# Patient Record
Sex: MALE | Race: WHITE | HISPANIC OR LATINO | Employment: FULL TIME | URBAN - METROPOLITAN AREA
[De-identification: names, ages, dates, MRNs, and addresses within clinical notes are randomized per-mention and may not be internally consistent; named-entity substitution may affect disease eponyms.]

---

## 2017-06-17 ENCOUNTER — OFFICE VISIT (OUTPATIENT)
Dept: URGENT CARE | Facility: PHYSICIAN GROUP | Age: 32
End: 2017-06-17
Payer: COMMERCIAL

## 2017-06-17 VITALS
HEART RATE: 72 BPM | TEMPERATURE: 98.1 F | BODY MASS INDEX: 31.58 KG/M2 | DIASTOLIC BLOOD PRESSURE: 80 MMHG | HEIGHT: 64 IN | WEIGHT: 185 LBS | OXYGEN SATURATION: 95 % | SYSTOLIC BLOOD PRESSURE: 118 MMHG

## 2017-06-17 DIAGNOSIS — R10.9 BELLY PAIN: ICD-10-CM

## 2017-06-17 DIAGNOSIS — R53.83 MALAISE AND FATIGUE: ICD-10-CM

## 2017-06-17 DIAGNOSIS — R21 RASH/SKIN ERUPTION: ICD-10-CM

## 2017-06-17 DIAGNOSIS — R53.81 MALAISE AND FATIGUE: ICD-10-CM

## 2017-06-17 LAB
APPEARANCE UR: CLEAR
BILIRUB UR STRIP-MCNC: NORMAL MG/DL
COLOR UR AUTO: YELLOW
GLUCOSE UR STRIP.AUTO-MCNC: NORMAL MG/DL
KETONES UR STRIP.AUTO-MCNC: NORMAL MG/DL
LEUKOCYTE ESTERASE UR QL STRIP.AUTO: NORMAL
NITRITE UR QL STRIP.AUTO: NORMAL
PH UR STRIP.AUTO: 6 [PH] (ref 5–8)
PROT UR QL STRIP: NORMAL MG/DL
RBC UR QL AUTO: NORMAL
SP GR UR STRIP.AUTO: 1.02
UROBILINOGEN UR STRIP-MCNC: NORMAL MG/DL

## 2017-06-17 PROCEDURE — 99203 OFFICE O/P NEW LOW 30 MIN: CPT | Performed by: FAMILY MEDICINE

## 2017-06-17 PROCEDURE — 81002 URINALYSIS NONAUTO W/O SCOPE: CPT | Performed by: FAMILY MEDICINE

## 2017-06-17 RX ORDER — ALBENDAZOLE 200 MG/1
400 TABLET, FILM COATED ORAL ONCE
Qty: 2 TAB | Refills: 0 | Status: SHIPPED | OUTPATIENT
Start: 2017-06-17 | End: 2017-06-17

## 2017-06-17 RX ORDER — DESONIDE 0.5 MG/G
CREAM TOPICAL
Qty: 30 G | Refills: 1 | Status: SHIPPED | OUTPATIENT
Start: 2017-06-17 | End: 2018-08-14

## 2017-06-17 RX ORDER — DICYCLOMINE HCL 20 MG
20 TABLET ORAL EVERY 6 HOURS
Qty: 40 TAB | Refills: 3 | Status: SHIPPED | OUTPATIENT
Start: 2017-06-17 | End: 2017-06-27

## 2017-06-17 ASSESSMENT — ENCOUNTER SYMPTOMS
FEVER: 0
FOCAL WEAKNESS: 0
DIZZINESS: 0
ORTHOPNEA: 0
HEMOPTYSIS: 0
ABDOMINAL PAIN: 1
SHORTNESS OF BREATH: 0
CHILLS: 0

## 2017-06-17 NOTE — PROGRESS NOTES
"Subjective:      Suresh Ho is a 32 y.o. male who presents with Abdominal Pain    Chief Complaint   Patient presents with   • Abdominal Pain     weak, skin discoloration, bloated, center abd pain x 3 months, sx getting worse        - This is a very pleasant 32 y.o. male with complaints of intermittent spells of non specific abd pain, malaise bout diarrhea abd craps/bloating for past 12yrs. No weight changes fever. He is worried about parasites. Also itchy rash for several months Rt elbow and eyelids.     Has no PCP. We discussed he will need to get in w/ pcp and GI to work these ongoing longterm issues up.           ALLERGIES:  Review of patient's allergies indicates not on file.     PMH:  No past medical history on file.     MEDS:    Current outpatient prescriptions:   •  desonide (TRIDESILON) 0.05 % Cream, Apply tid x 1 week, Disp: 30 g, Rfl: 1  •  dicyclomine (BENTYL) 20 MG Tab, Take 1 Tab by mouth every 6 hours for 10 days., Disp: 40 Tab, Rfl: 3  •  albendazole (ALBENZA) 200 MG Tab, Take 2 Tabs by mouth Once for 1 dose., Disp: 2 Tab, Rfl: 0    ** I have documented what I find to be significant in regards to past medical, social, family and surgical history  in my HPI or under PMH/PSH/FH review section, otherwise it is contributory **             HPI    Review of Systems   Constitutional: Negative for fever and chills.   Respiratory: Negative for hemoptysis and shortness of breath.    Cardiovascular: Negative for chest pain and orthopnea.   Gastrointestinal: Positive for abdominal pain.   Neurological: Negative for dizziness and focal weakness.          Objective:     /80 mmHg  Pulse 72  Temp(Src) 36.7 °C (98.1 °F)  Ht 1.626 m (5' 4\")  Wt 83.915 kg (185 lb)  BMI 31.74 kg/m2  SpO2 95%     Physical Exam   Constitutional: He appears well-developed. No distress.   HENT:   Head: Normocephalic and atraumatic.   Mouth/Throat: Oropharynx is clear and moist.   Eyes: Conjunctivae are normal.   Neck: " Neck supple.   Cardiovascular: Regular rhythm.    No murmur heard.  Pulmonary/Chest: Effort normal and breath sounds normal. No respiratory distress.   Abdominal: Soft. He exhibits no distension. There is no tenderness. There is no rebound and no guarding.   Neurological: He is alert. He exhibits normal muscle tone.   Skin: Skin is warm and dry.   Psychiatric: He has a normal mood and affect. Judgment normal.   Nursing note and vitals reviewed.              Assessment/Plan:         1. Belly pain  REFERRAL TO GASTROENTEROLOGY    REFERRAL TO [facility]    POCT Urinalysis    CBC WITH DIFFERENTIAL    COMP METABOLIC PANEL    TSH WITH REFLEX TO FT4    COMPLETE O&P    dicyclomine (BENTYL) 20 MG Tab    albendazole (ALBENZA) 200 MG Tab   2. Malaise and fatigue  REFERRAL TO GASTROENTEROLOGY    REFERRAL TO [facility]    POCT Urinalysis    CBC WITH DIFFERENTIAL    COMP METABOLIC PANEL    TSH WITH REFLEX TO FT4    COMPLETE O&P   3. Rash/skin eruption  REFERRAL TO [facility]    CBC WITH DIFFERENTIAL    COMP METABOLIC PANEL    TSH WITH REFLEX TO FT4    desonide (TRIDESILON) 0.05 % Cream             Dx & d/c instructions discussed w/ patient and/or family members. Follow up w/ Prvt Dr or here in 3-4 days if not getting better, sooner if needed,  ER if worse and UC/PCP unavailable.        Possible side effects (i.e. Rash, GI upset/constipation, sedation, elevation of BP or sugars) of any medications given discussed.

## 2017-06-19 ENCOUNTER — HOSPITAL ENCOUNTER (OUTPATIENT)
Dept: LAB | Facility: MEDICAL CENTER | Age: 32
End: 2017-06-19
Attending: FAMILY MEDICINE
Payer: COMMERCIAL

## 2017-06-19 DIAGNOSIS — R53.81 MALAISE AND FATIGUE: ICD-10-CM

## 2017-06-19 DIAGNOSIS — R53.83 MALAISE AND FATIGUE: ICD-10-CM

## 2017-06-19 DIAGNOSIS — R10.9 BELLY PAIN: ICD-10-CM

## 2017-06-19 DIAGNOSIS — R21 RASH/SKIN ERUPTION: ICD-10-CM

## 2017-06-19 LAB
ALBUMIN SERPL BCP-MCNC: 4.4 G/DL (ref 3.2–4.9)
ALBUMIN/GLOB SERPL: 1.6 G/DL
ALP SERPL-CCNC: 74 U/L (ref 30–99)
ALT SERPL-CCNC: 78 U/L (ref 2–50)
ANION GAP SERPL CALC-SCNC: 8 MMOL/L (ref 0–11.9)
AST SERPL-CCNC: 31 U/L (ref 12–45)
BASOPHILS # BLD AUTO: 0.7 % (ref 0–1.8)
BASOPHILS # BLD: 0.04 K/UL (ref 0–0.12)
BILIRUB SERPL-MCNC: 0.3 MG/DL (ref 0.1–1.5)
BUN SERPL-MCNC: 22 MG/DL (ref 8–22)
CALCIUM SERPL-MCNC: 9.6 MG/DL (ref 8.5–10.5)
CHLORIDE SERPL-SCNC: 107 MMOL/L (ref 96–112)
CO2 SERPL-SCNC: 24 MMOL/L (ref 20–33)
CREAT SERPL-MCNC: 1.04 MG/DL (ref 0.5–1.4)
EOSINOPHIL # BLD AUTO: 0.08 K/UL (ref 0–0.51)
EOSINOPHIL NFR BLD: 1.5 % (ref 0–6.9)
ERYTHROCYTE [DISTWIDTH] IN BLOOD BY AUTOMATED COUNT: 43 FL (ref 35.9–50)
GFR SERPL CREATININE-BSD FRML MDRD: >60 ML/MIN/1.73 M 2
GLOBULIN SER CALC-MCNC: 2.7 G/DL (ref 1.9–3.5)
GLUCOSE SERPL-MCNC: 104 MG/DL (ref 65–99)
HCT VFR BLD AUTO: 47.9 % (ref 42–52)
HGB BLD-MCNC: 15.9 G/DL (ref 14–18)
IMM GRANULOCYTES # BLD AUTO: 0.01 K/UL (ref 0–0.11)
IMM GRANULOCYTES NFR BLD AUTO: 0.2 % (ref 0–0.9)
LYMPHOCYTES # BLD AUTO: 2.51 K/UL (ref 1–4.8)
LYMPHOCYTES NFR BLD: 46.4 % (ref 22–41)
MCH RBC QN AUTO: 30.9 PG (ref 27–33)
MCHC RBC AUTO-ENTMCNC: 33.2 G/DL (ref 33.7–35.3)
MCV RBC AUTO: 93 FL (ref 81.4–97.8)
MONOCYTES # BLD AUTO: 0.4 K/UL (ref 0–0.85)
MONOCYTES NFR BLD AUTO: 7.4 % (ref 0–13.4)
NEUTROPHILS # BLD AUTO: 2.37 K/UL (ref 1.82–7.42)
NEUTROPHILS NFR BLD: 43.8 % (ref 44–72)
NRBC # BLD AUTO: 0 K/UL
NRBC BLD AUTO-RTO: 0 /100 WBC
PLATELET # BLD AUTO: 229 K/UL (ref 164–446)
PMV BLD AUTO: 12 FL (ref 9–12.9)
POTASSIUM SERPL-SCNC: 4 MMOL/L (ref 3.6–5.5)
PROT SERPL-MCNC: 7.1 G/DL (ref 6–8.2)
RBC # BLD AUTO: 5.15 M/UL (ref 4.7–6.1)
SODIUM SERPL-SCNC: 139 MMOL/L (ref 135–145)
TSH SERPL DL<=0.005 MIU/L-ACNC: 1.72 UIU/ML (ref 0.3–3.7)
WBC # BLD AUTO: 5.4 K/UL (ref 4.8–10.8)

## 2017-06-19 PROCEDURE — 84443 ASSAY THYROID STIM HORMONE: CPT

## 2017-06-19 PROCEDURE — 87328 CRYPTOSPORIDIUM AG IA: CPT

## 2017-06-19 PROCEDURE — 80053 COMPREHEN METABOLIC PANEL: CPT

## 2017-06-19 PROCEDURE — 87329 GIARDIA AG IA: CPT

## 2017-06-19 PROCEDURE — 36415 COLL VENOUS BLD VENIPUNCTURE: CPT

## 2017-06-19 PROCEDURE — 85025 COMPLETE CBC W/AUTO DIFF WBC: CPT

## 2017-06-20 LAB
G LAMBLIA+C PARVUM AG STL QL RAPID: NORMAL
SIGNIFICANT IND 70042: NORMAL
SOURCE SOURCE: NORMAL

## 2017-06-21 ENCOUNTER — TELEPHONE (OUTPATIENT)
Dept: URGENT CARE | Facility: PHYSICIAN GROUP | Age: 32
End: 2017-06-21

## 2017-06-21 NOTE — TELEPHONE ENCOUNTER
Kindly call (document call) and leave message or let patient know his lab work/stool sample tests were all essentially normal. Follow up w/ GI as planned.

## 2017-07-03 ENCOUNTER — APPOINTMENT (OUTPATIENT)
Dept: MEDICAL GROUP | Facility: MEDICAL CENTER | Age: 32
End: 2017-07-03

## 2017-07-17 ENCOUNTER — OFFICE VISIT (OUTPATIENT)
Dept: MEDICAL GROUP | Facility: MEDICAL CENTER | Age: 32
End: 2017-07-17
Payer: COMMERCIAL

## 2017-07-17 VITALS
OXYGEN SATURATION: 99 % | RESPIRATION RATE: 16 BRPM | TEMPERATURE: 99.7 F | DIASTOLIC BLOOD PRESSURE: 76 MMHG | WEIGHT: 172.6 LBS | HEIGHT: 64 IN | BODY MASS INDEX: 29.47 KG/M2 | HEART RATE: 74 BPM | SYSTOLIC BLOOD PRESSURE: 120 MMHG

## 2017-07-17 DIAGNOSIS — G43.709 CHRONIC MIGRAINE WITHOUT AURA WITHOUT STATUS MIGRAINOSUS, NOT INTRACTABLE: ICD-10-CM

## 2017-07-17 DIAGNOSIS — Z76.89 ENCOUNTER TO ESTABLISH CARE: ICD-10-CM

## 2017-07-17 DIAGNOSIS — M25.50 PAIN IN JOINTS: ICD-10-CM

## 2017-07-17 DIAGNOSIS — Z00.00 PREVENTATIVE HEALTH CARE: ICD-10-CM

## 2017-07-17 DIAGNOSIS — K21.9 GASTROESOPHAGEAL REFLUX DISEASE, ESOPHAGITIS PRESENCE NOT SPECIFIED: ICD-10-CM

## 2017-07-17 DIAGNOSIS — K92.1 HEMATOCHEZIA: ICD-10-CM

## 2017-07-17 DIAGNOSIS — R14.0 ABDOMINAL BLOATING: ICD-10-CM

## 2017-07-17 PROBLEM — G43.909 MIGRAINES: Status: ACTIVE | Noted: 2017-07-17

## 2017-07-17 PROCEDURE — 99203 OFFICE O/P NEW LOW 30 MIN: CPT | Performed by: PHYSICIAN ASSISTANT

## 2017-07-17 RX ORDER — SUMATRIPTAN 50 MG/1
50 TABLET, FILM COATED ORAL
Qty: 10 TAB | Refills: 3 | Status: SHIPPED | OUTPATIENT
Start: 2017-07-17 | End: 2018-08-14

## 2017-07-17 ASSESSMENT — PATIENT HEALTH QUESTIONNAIRE - PHQ9: CLINICAL INTERPRETATION OF PHQ2 SCORE: 0

## 2017-07-17 NOTE — ASSESSMENT & PLAN NOTE
Also complains of a history of abdominal bloating with associated reflux. Symptoms of bloating is more pain. Denies any nausea or vomiting. No diarrhea or constipation. He has a referral to see GI and an appointment in August.

## 2017-07-17 NOTE — Clinical Note
[facility]  Luis Pradhan PA-C  [address]  [place] [address]  Fax: [phone]   Authorization for Release/Disclosure of   Protected Health Information   Name: SURESH RODRIGUEZ : 1985 SSN: [id number]   Address: [address] [place] [id number] Phone:    [phone] (home)    I authorize the entity listed below to release/disclose the PHI below to:   [facility]/Luis Pradhan PA-C and Luis Pradhan PA-C   Provider or Entity Name:  [facility]   Address   City, State, [place], [place]   Phone:      Fax:     Reason for request: continuity of care   Information to be released:    [  ] LAST COLONOSCOPY,  including any PATH REPORT and follow-up  [  ] LAST FIT/COLOGUARD RESULT [  ] LAST DEXA  [  ] LAST MAMMOGRAM  [  ] LAST PAP  [  ] LAST LABS [  ] RETINA EXAM REPORT  [  ] IMMUNIZATION RECORDS  [xxx  ] Release all info      [  ] Check here and initial the line next to each item to release ALL health information INCLUDING  _____ Care and treatment for drug and / or alcohol abuse  _____ HIV testing, infection status, or AIDS  _____ Genetic Testing    DATES OF SERVICE OR TIME PERIOD TO BE DISCLOSED: _____________  I understand and acknowledge that:  * This Authorization may be revoked at any time by you in writing, except if your health information has already been used or disclosed.  * Your health information that will be used or disclosed as a result of you signing this authorization could be re-disclosed by the recipient. If this occurs, your re-disclosed health information may no longer be protected by State or Federal laws.  * You may refuse to sign this Authorization. Your refusal will not affect your ability to obtain treatment.  * This Authorization becomes effective upon signing and will  on (date) __________.      If no date is indicated, this Authorization will  one (1) year from the signature date.    Name: Suresh Rodriguez    Signature:   Date:          7/17/2017       PLEASE FAX REQUESTED RECORDS BACK TO: [phone]

## 2017-07-17 NOTE — ASSESSMENT & PLAN NOTE
This is a 32-year-old male who is here today for follow-up. Seen in urgent care. He has a history of right red blood per rectum. Typically he'll have a lot of blood noted and sometimes inside of the stool. This going on for many years. He had labs performed that showed no abnormalities and his CBC or Giardia panel.

## 2017-07-17 NOTE — PROGRESS NOTES
Subjective:   Suresh Ho is a 32 y.o. male here today for establishing care and to discuss several chronic medical conditions.    Pain in joints  Complains of a chronic history of bilateral knee pain. Pain also in his elbow and shoulders. Symptoms usually worse in the winter. He believes he has arthritis. Takes no regular medications.     Hematochezia  This is a 32-year-old male who is here today for follow-up. Seen in urgent care. He has a history of right red blood per rectum. Typically he'll have a lot of blood noted and sometimes inside of the stool. This going on for many years. He had labs performed that showed no abnormalities and his CBC or Giardia panel.    Abdominal bloating  Also complains of a history of abdominal bloating with associated reflux. Symptoms of bloating is more pain. Denies any nausea or vomiting. No diarrhea or constipation. He has a referral to see GI and an appointment in August.    GERD (gastroesophageal reflux disease)  Has heartburn and reflux symptoms for several years. Symptoms occur 2-3 times a week. He will take Tums for symptoms. States recently changed his diet. He is eating healthier. Gave up caffeine.    Migraines  Has a chronic history of migraines. Migraines usually the left side of the head. We'll take a daily resolved. Associated nauseousness but no vomiting. He will take over-the-counter Tylenol or other migraine medications. Denies anything being very effective.       Current medicines (including changes today)  Current Outpatient Prescriptions   Medication Sig Dispense Refill   • sumatriptan (IMITREX) 50 MG Tab Take 1 Tab by mouth Once PRN for Migraine for up to 1 dose. May take one tablet 2 hours after 1st if still with migraine. 10 Tab 3   • desonide (TRIDESILON) 0.05 % Cream Apply tid x 1 week 30 g 1     No current facility-administered medications for this visit.     He  has no past medical history on file.    ROS   No chest pain, no shortness of breath,  "no abdominal pain and all other systems were reviewed and are negative.       Objective:     Blood pressure 120/76, pulse 74, temperature 37.6 °C (99.7 °F), resp. rate 16, height 1.626 m (5' 4.02\"), weight 78.291 kg (172 lb 9.6 oz), SpO2 99 %. Body mass index is 29.61 kg/(m^2).   Physical Exam:  Constitutional: Alert, no distress.  Skin: Warm, dry, good turgor, no rashes in visible areas.  Eye: Equal, round and reactive, conjunctiva clear, lids normal.  ENMT: Lips without lesions, good dentition, oropharynx clear.  Neck: Trachea midline, no masses.   Lymph: No cervical or supraclavicular lymphadenopathy  Respiratory: Unlabored respiratory effort, lungs clear to auscultation, no wheezes, no ronchi.  Cardiovascular: Normal S1, S2, no murmur, no edema.  Abdomen: Soft, non-tender, no masses.  Psych: Alert and oriented x3, normal affect and mood.        Assessment and Plan:   The following treatment plan was discussed    1. Hematochezia  Chronic condition likely secondary to hemorrhoids. Will need a colonoscopy. Continue follow-up with GI.    2. Chronic migraine without aura without status migrainosus, not intractable  Chronic condition. May continue over-the-counter vitamin medication as directed. Provided Imitrex 50 mg take as directed.    3. Abdominal bloating  Chronic condition. Likely secondary to GERD. Follow up with GI for upper endoscopy.    4. Gastroesophageal reflux disease, esophagitis presence not specified  Chronic condition. We'll follow up with GI to discuss medications and possible procedure. Talked about omeprazole but will let GI decide. He doesn't want to take something with side effects.    5. Preventative health care  Order lipid profile and hemoglobin A1c.  - LIPID PROFILE; Future  - HEMOGLOBIN A1C; Future    6. Pain in joints  Chronic condition. We'll order a baseline x-ray of his knee.  - DX-KNEE 3 VIEWS RIGHT; Future    7. Encounter to establish care      Followup: Return if symptoms worsen or " fail to improve.    Please note that this dictation was created using voice recognition software. I have made every reasonable attempt to correct obvious errors, but I expect that there are errors of grammar and possibly content that I did not discover before finalizing the note.

## 2017-07-17 NOTE — ASSESSMENT & PLAN NOTE
Complains of a chronic history of bilateral knee pain. Pain also in his elbow and shoulders. Symptoms usually worse in the winter. He believes he has arthritis. Takes no regular medications.

## 2017-07-17 NOTE — ASSESSMENT & PLAN NOTE
Has heartburn and reflux symptoms for several years. Symptoms occur 2-3 times a week. He will take Tums for symptoms. States recently changed his diet. He is eating healthier. Gave up caffeine.

## 2017-07-17 NOTE — ASSESSMENT & PLAN NOTE
Has a chronic history of migraines. Migraines usually the left side of the head. We'll take a daily resolved. Associated nauseousness but no vomiting. He will take over-the-counter Tylenol or other migraine medications. Denies anything being very effective.

## 2017-07-31 ENCOUNTER — TELEPHONE (OUTPATIENT)
Dept: MEDICAL GROUP | Facility: MEDICAL CENTER | Age: 32
End: 2017-07-31

## 2017-07-31 NOTE — TELEPHONE ENCOUNTER
Phone Number Called: [phone] (home)     Message: Left message for patient to call back regarding needing additional information for his FMLA paperwork. Sent him a [employer] message this morning with no response back. Paperwork is due today.    Left Message for patient to call back: yes

## 2018-05-18 ENCOUNTER — APPOINTMENT (OUTPATIENT)
Dept: RADIOLOGY | Facility: MEDICAL CENTER | Age: 33
End: 2018-05-18
Attending: EMERGENCY MEDICINE
Payer: COMMERCIAL

## 2018-05-18 ENCOUNTER — APPOINTMENT (OUTPATIENT)
Dept: RADIOLOGY | Facility: MEDICAL CENTER | Age: 33
End: 2018-05-18
Attending: SURGERY
Payer: COMMERCIAL

## 2018-05-18 ENCOUNTER — HOSPITAL ENCOUNTER (EMERGENCY)
Facility: MEDICAL CENTER | Age: 33
End: 2018-05-18
Attending: EMERGENCY MEDICINE
Payer: COMMERCIAL

## 2018-05-18 VITALS
SYSTOLIC BLOOD PRESSURE: 135 MMHG | RESPIRATION RATE: 16 BRPM | WEIGHT: 186 LBS | HEIGHT: 65 IN | TEMPERATURE: 97.3 F | BODY MASS INDEX: 30.99 KG/M2 | HEART RATE: 70 BPM | DIASTOLIC BLOOD PRESSURE: 82 MMHG | OXYGEN SATURATION: 93 %

## 2018-05-18 DIAGNOSIS — S46.912A STRAIN OF LEFT SHOULDER, INITIAL ENCOUNTER: ICD-10-CM
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
## 2018-05-18 DIAGNOSIS — T07.XXXA MULTIPLE CONTUSIONS: ICD-10-CM

## 2018-05-18 LAB
ABO GROUP BLD: NORMAL
ALBUMIN SERPL BCP-MCNC: 5 G/DL (ref 3.2–4.9)
ALBUMIN/GLOB SERPL: 1.9 G/DL
ALP SERPL-CCNC: 74 U/L (ref 30–99)
ALT SERPL-CCNC: 52 U/L (ref 2–50)
ANION GAP SERPL CALC-SCNC: 12 MMOL/L (ref 0–11.9)
APTT PPP: 23.8 SEC (ref 24.7–36)
AST SERPL-CCNC: 30 U/L (ref 12–45)
BILIRUB SERPL-MCNC: 0.4 MG/DL (ref 0.1–1.5)
BLD GP AB SCN SERPL QL: NORMAL
BUN SERPL-MCNC: 32 MG/DL (ref 8–22)
CALCIUM SERPL-MCNC: 9.8 MG/DL (ref 8.5–10.5)
CHLORIDE SERPL-SCNC: 105 MMOL/L (ref 96–112)
CO2 SERPL-SCNC: 20 MMOL/L (ref 20–33)
CREAT SERPL-MCNC: 1.15 MG/DL (ref 0.5–1.4)
ERYTHROCYTE [DISTWIDTH] IN BLOOD BY AUTOMATED COUNT: 42 FL (ref 35.9–50)
ETHANOL BLD-MCNC: 0 G/DL
GLOBULIN SER CALC-MCNC: 2.6 G/DL (ref 1.9–3.5)
GLUCOSE SERPL-MCNC: 105 MG/DL (ref 65–99)
HCT VFR BLD AUTO: 48.3 % (ref 42–52)
HGB BLD-MCNC: 16.5 G/DL (ref 14–18)
INR PPP: 0.88 (ref 0.87–1.13)
MCH RBC QN AUTO: 31.7 PG (ref 27–33)
MCHC RBC AUTO-ENTMCNC: 34.2 G/DL (ref 33.7–35.3)
MCV RBC AUTO: 92.9 FL (ref 81.4–97.8)
PLATELET # BLD AUTO: 258 K/UL (ref 164–446)
PMV BLD AUTO: 11.5 FL (ref 9–12.9)
POTASSIUM SERPL-SCNC: 3.5 MMOL/L (ref 3.6–5.5)
PROT SERPL-MCNC: 7.6 G/DL (ref 6–8.2)
PROTHROMBIN TIME: 11.7 SEC (ref 12–14.6)
RBC # BLD AUTO: 5.2 M/UL (ref 4.7–6.1)
RH BLD: NORMAL
SODIUM SERPL-SCNC: 137 MMOL/L (ref 135–145)
WBC # BLD AUTO: 7.1 K/UL (ref 4.8–10.8)

## 2018-05-18 PROCEDURE — 700117 HCHG RX CONTRAST REV CODE 255: Performed by: EMERGENCY MEDICINE

## 2018-05-18 PROCEDURE — 86901 BLOOD TYPING SEROLOGIC RH(D): CPT

## 2018-05-18 PROCEDURE — 305948 HCHG GREEN TRAUMA ACT PRE-NOTIFY NO CC

## 2018-05-18 PROCEDURE — 86850 RBC ANTIBODY SCREEN: CPT

## 2018-05-18 PROCEDURE — 73560 X-RAY EXAM OF KNEE 1 OR 2: CPT | Mod: LT

## 2018-05-18 PROCEDURE — 72170 X-RAY EXAM OF PELVIS: CPT

## 2018-05-18 PROCEDURE — 85610 PROTHROMBIN TIME: CPT

## 2018-05-18 PROCEDURE — 85027 COMPLETE CBC AUTOMATED: CPT

## 2018-05-18 PROCEDURE — 99285 EMERGENCY DEPT VISIT HI MDM: CPT

## 2018-05-18 PROCEDURE — 71045 X-RAY EXAM CHEST 1 VIEW: CPT

## 2018-05-18 PROCEDURE — 85730 THROMBOPLASTIN TIME PARTIAL: CPT

## 2018-05-18 PROCEDURE — 74177 CT ABD & PELVIS W/CONTRAST: CPT

## 2018-05-18 PROCEDURE — 80307 DRUG TEST PRSMV CHEM ANLYZR: CPT

## 2018-05-18 PROCEDURE — 80053 COMPREHEN METABOLIC PANEL: CPT

## 2018-05-18 PROCEDURE — 96374 THER/PROPH/DIAG INJ IV PUSH: CPT

## 2018-05-18 PROCEDURE — 86900 BLOOD TYPING SEROLOGIC ABO: CPT

## 2018-05-18 PROCEDURE — 70450 CT HEAD/BRAIN W/O DYE: CPT

## 2018-05-18 PROCEDURE — 73030 X-RAY EXAM OF SHOULDER: CPT | Mod: LT

## 2018-05-18 PROCEDURE — 700111 HCHG RX REV CODE 636 W/ 250 OVERRIDE (IP): Performed by: EMERGENCY MEDICINE

## 2018-05-18 PROCEDURE — 96375 TX/PRO/DX INJ NEW DRUG ADDON: CPT

## 2018-05-18 PROCEDURE — 72125 CT NECK SPINE W/O DYE: CPT

## 2018-05-18 RX ORDER — MORPHINE SULFATE 4 MG/ML
4 INJECTION, SOLUTION INTRAMUSCULAR; INTRAVENOUS ONCE
Status: COMPLETED | OUTPATIENT
Start: 2018-05-18 | End: 2018-05-18

## 2018-05-18 RX ORDER — ONDANSETRON 2 MG/ML
4 INJECTION INTRAMUSCULAR; INTRAVENOUS ONCE
Status: COMPLETED | OUTPATIENT
Start: 2018-05-18 | End: 2018-05-18

## 2018-05-18 RX ADMIN — MORPHINE SULFATE 4 MG: 4 INJECTION INTRAVENOUS at 08:51

## 2018-05-18 RX ADMIN — IOHEXOL 100 ML: 350 INJECTION, SOLUTION INTRAVENOUS at 08:21

## 2018-05-18 RX ADMIN — ONDANSETRON HYDROCHLORIDE 4 MG: 2 INJECTION, SOLUTION INTRAMUSCULAR; INTRAVENOUS at 08:51

## 2018-05-18 ASSESSMENT — PAIN SCALES - GENERAL: PAINLEVEL_OUTOF10: 4

## 2018-05-18 NOTE — ED NOTES
Pt states increasing pain to R shoulder. Requesting something for pain. See new orders.     No obvious deformity noted to shoulder. Pt taken off of backboard using c-spine precautions. Remains in c-collar.
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
## 2018-05-18 NOTE — ED NOTES
MD Ambrose notified of pt c/o pain to R shoulder. Per MD take off c-collar and sit pt up. Pt also given water for PO challenge. Medicated per MAR.     Xray at bedside.
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
## 2018-05-22 ENCOUNTER — OCCUPATIONAL MEDICINE (OUTPATIENT)
Dept: OCCUPATIONAL MEDICINE | Facility: CLINIC | Age: 33
End: 2018-05-22
Payer: COMMERCIAL

## 2018-05-22 VITALS
OXYGEN SATURATION: 94 % | RESPIRATION RATE: 14 BRPM | HEIGHT: 64 IN | DIASTOLIC BLOOD PRESSURE: 88 MMHG | TEMPERATURE: 98.8 F | SYSTOLIC BLOOD PRESSURE: 140 MMHG | BODY MASS INDEX: 31.58 KG/M2 | HEART RATE: 65 BPM | WEIGHT: 185 LBS

## 2018-05-22 DIAGNOSIS — S39.012D STRAIN OF LUMBAR REGION, SUBSEQUENT ENCOUNTER: ICD-10-CM

## 2018-05-22 DIAGNOSIS — S60.211D CONTUSION OF RIGHT WRIST, SUBSEQUENT ENCOUNTER: ICD-10-CM

## 2018-05-22 DIAGNOSIS — S46.919D STRAIN OF SHOULDER, UNSPECIFIED LATERALITY, SUBSEQUENT ENCOUNTER: ICD-10-CM

## 2018-05-22 DIAGNOSIS — S16.1XXD STRAIN OF NECK MUSCLE, SUBSEQUENT ENCOUNTER: ICD-10-CM

## 2018-05-22 DIAGNOSIS — F07.81 POST CONCUSSION SYNDROME: ICD-10-CM

## 2018-05-22 PROBLEM — S16.1XXA CERVICAL STRAIN: Status: ACTIVE | Noted: 2018-05-22

## 2018-05-22 PROBLEM — S46.919A STRAIN OF SHOULDER: Status: ACTIVE | Noted: 2018-05-22

## 2018-05-22 PROBLEM — S39.012A STRAIN OF LUMBAR REGION: Status: ACTIVE | Noted: 2018-05-22

## 2018-05-22 PROBLEM — S60.211A CONTUSION OF RIGHT WRIST: Status: ACTIVE | Noted: 2018-05-22

## 2018-05-22 PROCEDURE — 99204 OFFICE O/P NEW MOD 45 MIN: CPT | Performed by: PREVENTIVE MEDICINE

## 2018-05-22 RX ORDER — FLUOCINONIDE 0.5 MG/G
OINTMENT TOPICAL
COMMUNITY
Start: 2016-08-30 | End: 2018-08-14

## 2018-05-22 RX ORDER — IBUPROFEN 800 MG/1
800 TABLET ORAL EVERY 8 HOURS PRN
Qty: 30 TAB | Refills: 1 | Status: SHIPPED | OUTPATIENT
Start: 2018-05-22 | End: 2018-09-05

## 2018-05-22 RX ORDER — NORTRIPTYLINE HYDROCHLORIDE 10 MG/1
CAPSULE ORAL
COMMUNITY
Start: 2016-08-30 | End: 2018-08-14

## 2018-05-22 RX ORDER — FLUTICASONE PROPIONATE 50 MCG
SPRAY, SUSPENSION (ML) NASAL
COMMUNITY
Start: 2016-08-30 | End: 2018-08-14

## 2018-05-22 RX ORDER — CETIRIZINE HYDROCHLORIDE 10 MG/1
TABLET ORAL
COMMUNITY
Start: 2016-08-30 | End: 2018-08-14

## 2018-05-22 RX ORDER — CYCLOBENZAPRINE HCL 10 MG
TABLET ORAL
COMMUNITY
Start: 2016-10-24 | End: 2018-06-29

## 2018-05-22 RX ORDER — TIZANIDINE 4 MG/1
4 TABLET ORAL
Qty: 20 TAB | Refills: 0 | Status: SHIPPED | OUTPATIENT
Start: 2018-05-22 | End: 2018-06-29

## 2018-05-22 RX ORDER — NABUMETONE 750 MG/1
TABLET, FILM COATED ORAL
COMMUNITY
Start: 2016-12-21 | End: 2018-08-14

## 2018-05-22 ASSESSMENT — PAIN SCALES - GENERAL: PAINLEVEL: 6=MODERATE PAIN
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
## 2018-05-29 ENCOUNTER — OCCUPATIONAL MEDICINE (OUTPATIENT)
Dept: OCCUPATIONAL MEDICINE | Facility: CLINIC | Age: 33
End: 2018-05-29
Payer: COMMERCIAL

## 2018-05-29 VITALS
SYSTOLIC BLOOD PRESSURE: 112 MMHG | DIASTOLIC BLOOD PRESSURE: 60 MMHG | HEART RATE: 60 BPM | HEIGHT: 64 IN | WEIGHT: 185 LBS | BODY MASS INDEX: 31.58 KG/M2 | RESPIRATION RATE: 14 BRPM | OXYGEN SATURATION: 97 %

## 2018-05-29 DIAGNOSIS — S60.211D CONTUSION OF RIGHT WRIST, SUBSEQUENT ENCOUNTER: ICD-10-CM

## 2018-05-29 DIAGNOSIS — S39.012D STRAIN OF LUMBAR REGION, SUBSEQUENT ENCOUNTER: ICD-10-CM

## 2018-05-29 DIAGNOSIS — S16.1XXD STRAIN OF NECK MUSCLE, SUBSEQUENT ENCOUNTER: ICD-10-CM

## 2018-05-29 DIAGNOSIS — S46.919D STRAIN OF SHOULDER, UNSPECIFIED LATERALITY, SUBSEQUENT ENCOUNTER: ICD-10-CM

## 2018-05-29 PROCEDURE — 99214 OFFICE O/P EST MOD 30 MIN: CPT | Performed by: PREVENTIVE MEDICINE

## 2018-05-29 ASSESSMENT — PAIN SCALES - GENERAL: PAINLEVEL: 3=SLIGHT PAIN

## 2018-05-29 ASSESSMENT — ENCOUNTER SYMPTOMS
HEADACHES: 0
FOCAL WEAKNESS: 0
TINGLING: 0
DIZZINESS: 0
SENSORY CHANGE: 0
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
## 2018-06-01 ENCOUNTER — HOSPITAL ENCOUNTER (OUTPATIENT)
Dept: RADIOLOGY | Facility: MEDICAL CENTER | Age: 33
End: 2018-06-01
Attending: PREVENTIVE MEDICINE
Payer: COMMERCIAL

## 2018-06-01 DIAGNOSIS — S60.211D CONTUSION OF RIGHT WRIST, SUBSEQUENT ENCOUNTER: ICD-10-CM

## 2018-06-01 PROCEDURE — 73110 X-RAY EXAM OF WRIST: CPT | Mod: RT

## 2018-06-08 ENCOUNTER — OCCUPATIONAL MEDICINE (OUTPATIENT)
Dept: OCCUPATIONAL MEDICINE | Facility: CLINIC | Age: 33
End: 2018-06-08
Payer: COMMERCIAL

## 2018-06-08 VITALS
RESPIRATION RATE: 16 BRPM | HEIGHT: 64 IN | TEMPERATURE: 97.4 F | OXYGEN SATURATION: 98 % | SYSTOLIC BLOOD PRESSURE: 138 MMHG | WEIGHT: 185 LBS | DIASTOLIC BLOOD PRESSURE: 82 MMHG | BODY MASS INDEX: 31.58 KG/M2 | HEART RATE: 80 BPM

## 2018-06-08 DIAGNOSIS — S60.211D CONTUSION OF RIGHT WRIST, SUBSEQUENT ENCOUNTER: ICD-10-CM

## 2018-06-08 DIAGNOSIS — S46.919D STRAIN OF SHOULDER, UNSPECIFIED LATERALITY, SUBSEQUENT ENCOUNTER: ICD-10-CM

## 2018-06-08 DIAGNOSIS — S16.1XXD STRAIN OF NECK MUSCLE, SUBSEQUENT ENCOUNTER: ICD-10-CM

## 2018-06-08 DIAGNOSIS — S39.012D STRAIN OF LUMBAR REGION, SUBSEQUENT ENCOUNTER: ICD-10-CM

## 2018-06-08 PROCEDURE — 99213 OFFICE O/P EST LOW 20 MIN: CPT | Performed by: PREVENTIVE MEDICINE

## 2018-06-08 ASSESSMENT — PAIN SCALES - GENERAL: PAINLEVEL: 3=SLIGHT PAIN
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
## 2018-06-29 ENCOUNTER — OCCUPATIONAL MEDICINE (OUTPATIENT)
Dept: OCCUPATIONAL MEDICINE | Facility: CLINIC | Age: 33
End: 2018-06-29
Payer: COMMERCIAL

## 2018-06-29 VITALS
OXYGEN SATURATION: 96 % | WEIGHT: 185 LBS | BODY MASS INDEX: 31.58 KG/M2 | HEART RATE: 86 BPM | SYSTOLIC BLOOD PRESSURE: 112 MMHG | TEMPERATURE: 97.6 F | HEIGHT: 64 IN | RESPIRATION RATE: 14 BRPM | DIASTOLIC BLOOD PRESSURE: 60 MMHG

## 2018-06-29 DIAGNOSIS — S39.012D STRAIN OF LUMBAR REGION, SUBSEQUENT ENCOUNTER: ICD-10-CM

## 2018-06-29 DIAGNOSIS — S60.211D CONTUSION OF RIGHT WRIST, SUBSEQUENT ENCOUNTER: ICD-10-CM

## 2018-06-29 DIAGNOSIS — S16.1XXD STRAIN OF NECK MUSCLE, SUBSEQUENT ENCOUNTER: ICD-10-CM

## 2018-06-29 DIAGNOSIS — S46.919D STRAIN OF SHOULDER, UNSPECIFIED LATERALITY, SUBSEQUENT ENCOUNTER: ICD-10-CM

## 2018-06-29 PROCEDURE — 99212 OFFICE O/P EST SF 10 MIN: CPT | Performed by: PREVENTIVE MEDICINE

## 2018-06-29 RX ORDER — TIZANIDINE 4 MG/1
4 TABLET ORAL
Qty: 30 TAB | Refills: 0 | Status: SHIPPED | OUTPATIENT
Start: 2018-06-29 | End: 2018-09-05

## 2018-06-29 ASSESSMENT — PAIN SCALES - GENERAL: PAINLEVEL: 4=SLIGHT-MODERATE PAIN
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
## 2018-07-06 ENCOUNTER — OCCUPATIONAL MEDICINE (OUTPATIENT)
Dept: URGENT CARE | Facility: PHYSICIAN GROUP | Age: 33
End: 2018-07-06
Payer: COMMERCIAL

## 2018-07-06 VITALS
HEART RATE: 66 BPM | BODY MASS INDEX: 31.76 KG/M2 | HEIGHT: 64 IN | TEMPERATURE: 97.8 F | WEIGHT: 186 LBS | SYSTOLIC BLOOD PRESSURE: 120 MMHG | DIASTOLIC BLOOD PRESSURE: 80 MMHG | RESPIRATION RATE: 14 BRPM | OXYGEN SATURATION: 97 %

## 2018-07-06 DIAGNOSIS — R10.33 UMBILICAL PAIN: ICD-10-CM

## 2018-07-06 PROCEDURE — 99214 OFFICE O/P EST MOD 30 MIN: CPT | Mod: 29 | Performed by: PHYSICIAN ASSISTANT

## 2018-07-06 ASSESSMENT — ENCOUNTER SYMPTOMS: PAIN: 1

## 2018-07-06 NOTE — PROGRESS NOTES
"Subjective:      Suresh Ho is a 33 y.o. 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 Notes sharp pain to central umbilicus, c/o some discomfort radiating down on left side as well, PMH of abd surgery - appendectomy around 9-10yrs ago. Denies diarrhea or constipation today, did have single momentary nausea w/o emesis earlier today. Denies any other current employment. Chart shows a visit to urgent care approximately one year ago during which patient describes 12 years of waxing and waning abdominal discomfort when he is asked about this he states he has a history of mild belly pain and infrequent blood in stool. He notes his only past medical history abdominal surgery being an appendectomy 9-10 years ago.     Pain         ROS       Objective:     /80   Pulse 66   Temp 36.6 °C (97.8 °F)   Resp 14   Ht 1.626 m (5' 4\")   Wt 84.4 kg (186 lb)   SpO2 97%   BMI 31.93 kg/m²      Physical Exam    Gen: AOx3; Head: NC AT; Eyes: PERRLA/EOM; Lungs: NLR; Cardiac: RR by periph pulse exam; ABD: grossly normal w/ no distension, erythema, ecchymosis, c/o mild superficial ttp over umbilicus and LLQ, no guarding and no change in mild normal appearing umbilical bulge w/ bearing down and lying flat, pt able to perform SLR bilat but with dramatic pain - pt's pain does seem to be out of proportion to described mechanism and exam findings but unclear       Assessment/Plan:   1. Umbilical pain  restricted duty w/ 25# weight restriction, no climbing or squatting and f/u w/ [facility],  ordered today   - REFERRAL TO OCCUPATIONAL MEDICINE      "

## 2018-07-06 NOTE — LETTER
[facility]  [address]  [place] [address]  Phone:  [phone] - Fax:  [phone]   [facility] Progress Report and Disability Certification  Date of Service: 7/6/2018   No Show:  No  Date / Time of Next Visit: 7/13/2018   Claim Information   Patient Name: Suresh Ho  Claim Number:     Employer:   [employer] Date of Injury: 7/5/2018     Insurer / TPA: [employer]  ID / SSN:     Occupation: [employer] Vol[employer]  Diagnosis: The encounter diagnosis was Umbilical pain.    Medical Information   Related to Industrial Injury? 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 Notes sharp pain to central umbilicus, c/o some discomfort radiating down on left side as well, PMH of abd surgery - appendectomy around 9-10yrs ago. Denies diarrhea or constipation today, did have single momentary nausea w/o emesis earlier today. Denies any other current employment. Chart shows a visit to urgent care approximately one year ago during which patient describes 12 years of waxing and waning abdominal discomfort when he is asked about this he states he has a history of mild belly pain and infrequent blood in stool. He notes his only past medical history abdominal surgery being an appendectomy 9-10 years ago.   Objective Findings: Gen: AOx3; Head: NC AT; Eyes: PERRLA/EOM; Lungs: NLR; Cardiac: RR by periph pulse exam; ABD: grossly normal w/ no distension, erythema, ecchymosis, c/o mild superficial ttp over umbilicus and LLQ, no guarding and no change in mild normal appearing umbilical bulge w/ bearing down and  lying flat, pt able to perform SLR bilat but with dramatic pain - pt's pain does seem to be out of proportion to described mechanism and exam findings but unclear   Pre-Existing Condition(s):     Assessment:   Initial Visit    Status: Additional Care Required  Permanent Disability:No    Plan:   Comments:restricted duty w/ # weight restriction, no climbing or squatting and f/u w/ [employer], [place] ordered today      Diagnostics:      Comments:       Disability Information   Status: Released to Restricted Duty    From:  2018  Through: 2018 Restrictions are: Temporary   Physical Restrictions   Sitting:    Standing:    Stooping:    Bending:      Squattin hrs/day Walking:    Climbin hrs/day Pushing:      Pulling:    Other:    Reaching Above Shoulder (L):   Reaching Above Shoulder (R):       Reaching Below Shoulder (L):    Reaching Below Shoulder (R):      Not to exceed Weight Limits   Carrying(hrs):   Weight Limit(lb): < or = to 25 pounds Lifting(hrs):   Weight  Limit(lb): < or = to 25 pounds   Comments: restricted duty # weight restriction, no climbing or squatting and f/u w/ [employer], [place] ordered today     Repetitive Actions   Hands: i.e. Fine Manipulations from Grasping:     Feet: i.e. Operating Foot Controls:     Driving / Operate Machinery:     Physician Name: Shady Campbell P.A.-C. Physician Signature: SHADY Silver P.A.-C. e-Signature: Dr. Chase Banks, Medical Director   Clinic Name / Location: [address] [address] Clinic Phone Number: Dept: [phone]   Appointment Time: 1:30 Pm Visit Start Time: 1:54 PM   Check-In Time:  1:37 Pm Visit Discharge Time:  2:28 PM   Original-Treating Physician or Chiropractor    Page 2-Insurer/TPA    Page 3-Employer    Page 4-Employee

## 2018-07-06 NOTE — LETTER
"EMPLOYEE’S CLAIM FOR COMPENSATION/ REPORT OF INITIAL TREATMENT  FORM C-4    EMPLOYEE’S CLAIM - PROVIDE ALL INFORMATION REQUESTED   First Name  Suresh Last Name  [place] Birthdate                    1985                Sex  male Claim Number   Home Address  [address] [place] Age  33 y.o. 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 (Be specific an answer in detail. Use additional sheet if necessary)  I was lifting 7 spolls of cable that where on a pipe with the assistance of a coworker.   If you believe that you have an occupational disease, when did you first have knowledge of the disability and it relationship to your employment?  N/A Witnesses to the Accident  N/A      Nature of Injury or Occupational Disease  Workers' Compensation  Part(s) of Body Injured or Affected  Internal Organs, N/A, N/A    I certify that the above is true and correct to the best of my knowledge and that I have provided this information in order to obtain the benefits of " [place]  I hereby authorize any physician, chiropractor, surgeon, practitioner, or other person, any hospital, including [facility] or [facility], any medical service organization, any insurance company, or other institution or organization to release to each other, any medical or other information, including benefits paid or payable, pertinent to this injury or disease, except information relative to diagnosis, treatment and/or counseling for AIDS, psychological conditions, alcohol or controlled substances, for which I must give specific authorization.  A Photostat of this authorization shall be as valid as the original.     Date   Place   Employee’s Signature   THIS REPORT MUST BE COMPLETED AND MAILED WITHIN 3 WORKING DAYS OF TREATMENT   Place  [facility]  Name of Facility  [facility]   Date  7/6/2018 Diagnosis  (R10.33) Umbilical pain Is there evidence the injured employee was under the influence of alcohol and/or another controlled substance at the time of accident?   Hour  1:54 PM Description of Injury or Disease  The encounter diagnosis was Umbilical pain. No   Treatment  restricted duty w/ 25# weight restriction, no climbing or squatting and f/u w/ [facility], [place] ordered today   Have you advised the patient to remain off work five days or more? No   X-Ray Findings      If Yes   From Date  To Date      From information given by the employee, together with medical evidence, can you directly connect this injury or occupational disease as job incurred?  No  Comments:unclear mechanism, possible but unlikely umbilical hernia from lifting yesterday - will restrict pt, order US and refer to [facility] health today If No Full Duty  No Modified Duty  Yes   Is additional medical care by a physician indicated?  Yes If Modified Duty, Specify any Limitations / Restrictions  restricted duty w/  "25# weight restriction, no climbing or squatting and f/u w/ [facility], [place] ordered today    Do you know of any previous injury or disease contributing to this condition or occupational disease?                            Yes  Comments:PMH of seeing UC for abd discomfort and PMH of appendectomy   Date  7/6/2018 Print Doctor’s Name Shady Campbell P.A.-C. I certify the employer’s copy of  this form was mailed on:   Address  [address] Insurer’s Use Only     [facility]  [address]    Provider’s Tax ID Number  [id number] Telephone  Dept: [phone]        e-SHADY Alexander P.A.-C.   e-Signature: Dr. Chase Banks, Medical Director Degree  SOBEIDA        ORIGINAL-TREATING PHYSICIAN OR CHIROPRACTOR    PAGE 2-INSURER/TPA    PAGE 3-EMPLOYER    PAGE 4-EMPLOYEE             Form C-4 (rev10/07)              BRIEF DESCRIPTION OF RIGHTS AND BENEFITS  (Pursuant to [place] 616C.050)    Notice of Injury or Occupational Disease (Incident Report Form C-1): If an injury or occupational disease (OD) arises out of and in the  course of employment, you must provide written notice to your employer as soon as practicable, but no later than 7 days after the accident or  OD. Your employer shall maintain a sufficient supply of the required forms.    Claim for Compensation (Form C-4): If medical treatment is sought, the form C-4 is available at the place of initial treatment. A completed  \"Claim for Compensation\" (Form C-4) must be filed within 90 days after an accident or OD. The treating physician or chiropractor must,  within 3 working days after treatment, complete and mail to the employer, the employer's insurer and third-party , the Claim for  Compensation.    Medical Treatment: If you require medical treatment for your on-the-job injury or OD, you may be required to select a physician or  chiropractor from a list provided by your workers’ compensation insurer, if it has contracted with an " Organization for Managed Care (MCO) or  Preferred Provider Organization (PPO) or providers of health care. If your employer has not entered into a contract with an MCO or PPO, you  may select a physician or chiropractor from the Panel of Physicians and Chiropractors. Any medical costs related to your industrial injury or  OD will be paid by your insurer.    Temporary Total Disability (TTD): If your doctor has certified that you are unable to work for a period of at least 5 consecutive days, or 5  cumulative days in a 20-day period, or places restrictions on you that your employer does not accommodate, you may be entitled to TTD  compensation.    Temporary Partial Disability (TPD): If the wage you receive upon reemployment is less than the compensation for TTD to which you are  entitled, the insurer may be required to pay you TPD compensation to make up the difference. TPD can only be paid for a maximum of 24  months.    Permanent Partial Disability (PPD): When your medical condition is stable and there is an indication of a PPD as a result of your injury or  OD, within 30 days, your insurer must arrange for an evaluation by a rating physician or chiropractor to determine the degree of your PPD. The  amount of your PPD award depends on the date of injury, the results of the PPD evaluation and your age and wage.    Permanent Total Disability (PTD): If you are medically certified by a treating physician or chiropractor as permanently and totally disabled  and have been granted a PTD status by your insurer, you are entitled to receive monthly benefits not to exceed 66 2/3% of your average  monthly wage. The amount of your PTD payments is subject to reduction if you previously received a PPD award.    Vocational Rehabilitation Services: You may be eligible for vocational rehabilitation services if you are unable to return to the job due to a  permanent physical impairment or permanent restrictions as a result of your  injury or occupational disease.    Transportation and Per Arleth Reimbursement: You may be eligible for travel expenses and per arleth associated with medical treatment.    Reopening: You may be able to reopen your claim if your condition worsens after claim closure.    Appeal Process: If you disagree with a written determination issued by the insurer or the insurer does not respond to your request, you may  appeal to the Department of Administration, , by following the instructions contained in your determination letter. You must  appeal the determination within 70 days from the date of the determination letter at [address], [address], [place], [place]  [address], or [address], [address], [place], [place] [address]. If you disagree with the  decision, you may appeal to the  Department of Administration, . You must file your appeal within 30 days from the date of the  decision  letter at [address], [address], [place], [place] [address], or [address] [facility] [address], [place] [address]. If you  disagree with a decision of an , you may file a petition for judicial review with the District Court. You must do so within 30  days of the Appeal Officer’s decision. You may be represented by an  at your own expense or you may contact the [facility] for possible  representation.    [place]  for Injured Workers (NAIW): If you disagree with a  decision, you may request that NAIW represent you  without charge at an  Hearing. For information regarding denial of benefits, you may contact the [facility] at: [address], [address], [place] [address], [phone], or [address] [facility] [address], [place] [address], [phone]    To File a Complaint with the Division: If you wish to file a complaint with the  of the Division of Industrial Relations  (DIR),  please contact the Workers’ Compensation Section, [address], [address], [place], [place] [address], telephone (613) [phone], or  [address], [address], [place], [place] [address], telephone [phone].    For assistance with Workers’ Compensation Issues: you may contact the Office of the [facility] Consumer Health Assistance, [address] [place] [address], [address], [place], [place] [address], Toll Free 1-622.666.6760, Web site: http://govcha.[facility].nv., E-mail  [email or web address].                                                                                                                                                                                                                                   __________________________________________________________________                                                                   _________________                Employee Name / Signature                                                                                                                                                       Date                                                                                                                                                                                                     D-2 (rev. 10/07)

## 2018-07-13 ENCOUNTER — OCCUPATIONAL MEDICINE (OUTPATIENT)
Dept: OCCUPATIONAL MEDICINE | Facility: CLINIC | Age: 33
End: 2018-07-13
Payer: COMMERCIAL

## 2018-07-13 VITALS
OXYGEN SATURATION: 99 % | SYSTOLIC BLOOD PRESSURE: 120 MMHG | HEART RATE: 56 BPM | DIASTOLIC BLOOD PRESSURE: 80 MMHG | TEMPERATURE: 97.5 F | HEIGHT: 64 IN | BODY MASS INDEX: 30.22 KG/M2 | WEIGHT: 177 LBS

## 2018-07-13 DIAGNOSIS — K42.9 UMBILICAL HERNIA WITHOUT OBSTRUCTION AND WITHOUT GANGRENE: ICD-10-CM

## 2018-07-13 PROCEDURE — 99214 OFFICE O/P EST MOD 30 MIN: CPT | Performed by: PREVENTIVE MEDICINE

## 2018-07-13 ASSESSMENT — ENCOUNTER SYMPTOMS
CONSTITUTIONAL NEGATIVE: 1
NEUROLOGICAL NEGATIVE: 1

## 2018-07-13 ASSESSMENT — PAIN SCALES - GENERAL: PAINLEVEL: 6=MODERATE PAIN

## 2018-07-13 NOTE — PROGRESS NOTES
"Subjective:      Suresh Ho is a 33 y.o. male who presents with Follow-Up (WC DOI 7/5/18 hernia, feeling the same, room 1)      Date of incident 7/5/2018. Incident-\"lifting spools of cable, felt sharp pain, woke up with severe pain\". 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 He was seen on urgent care a few days ago where ultrasound was requested. He believes this originated after repetitive heavy lifting. He said he woke up the next morning and \"noticed my bellybutton sticking out\". He complains of moderate pain and sensitivity. No fever, chills, or other constitutional symptoms.     HPI    Review of Systems   Constitutional: Negative.    Neurological: Negative.      PFSH:  WORK STATUS: Restricted activity  PMH:  has no past medical history on file.  MEDS:   Current Outpatient Prescriptions:   •  tizanidine (ZANAFLEX) 4 MG Tab, Take 1 Tab by mouth at bedtime as needed., Disp: 30 Tab, Rfl: 0  •  cetirizine (ZYRTEC) 10 MG Tab, Take  by mouth., Disp: , Rfl:   •  fluocinonide (LIDEX) 0.05 % Ointment, Apply  to affected area(s)., Disp: , Rfl:   •  fluticasone (FLONASE) 50 MCG/ACT nasal spray, Spray  in nose., Disp: , Rfl:   •  nabumetone (RELAFEN) 750 MG Tab, Take  by mouth., Disp: , Rfl:   •  nortriptyline (PAMELOR) 10 MG Cap, Take  by mouth., Disp: , Rfl:   •  ibuprofen (MOTRIN) 800 MG Tab, Take 1 Tab by mouth every 8 hours as needed., Disp: 30 Tab, Rfl: 1  •  sumatriptan (IMITREX) 50 MG Tab, Take 1 Tab by mouth Once PRN for Migraine for up to 1 dose. May take one tablet 2 hours after 1st if still with migraine., Disp: 10 Tab, Rfl: 3  •  desonide (TRIDESILON) 0.05 % Cream, Apply tid x 1 week, Disp: 30 g, Rfl: 1       Objective:     /80   Pulse (!) 56   Temp 36.4 °C (97.5 °F)   Ht 1.626 m (5' 4\")   Wt 80.3 kg (177 lb)   SpO2 99%   BMI 30.38 kg/m²      Physical Exam    Appearance: Well-developed, well-nourished.   Mental Status: Mood and Affect normal. Pleasant. Cooperative. " Appropriate.   ENT: Oropharynx clear. Moist mucous membranes. Hearing normal.   Eyes: Pupils reactive. Conjunctiva normal. No scleral icterus.   Neck: Trachea Midline. No thyromegaly. No masses.  Cardiovascular: Normal rate. Regular rhythm. Normal heart sounds.   Chest: Effort normal. Breath sounds clear.   Skin: Skin is warm and dry. No rash.   Abdomen: Tyler sized umbilical hernia with tenderness.         Assessment/Plan:     1. Umbilical hernia without obstruction and without gangrene  Established patient occupational health from urgent care  - REFERRAL TO GENERAL SURGERY  - Restricted activity  - Recheck 4 weeks if needed

## 2018-07-13 NOTE — LETTER
"   [address],   [address] [place] [address]  Phone:  [phone] - Fax:  [phone]   [facility] Progress Report and Disability Certification  Date of Service: 7/13/2018   No Show:  No  Date / Time of Next Visit: 8/9/2018@2:30pm   Claim Information   Patient Name: Suresh Ho  Claim Number:     Employer:   [employer] Date of Injury: 5/18/2018     Insurer / TPA: [employer]  ID / SSN:     Occupation:   Diagnosis: The encounter diagnosis was Umbilical hernia without obstruction and without gangrene.    Medical Information   Related to Industrial Injury?   Comments:Indeterminate-awaiting final determination from insurance    Subjective Complaints:  Date of incident 7/5/2018. Incident-\"lifting spools of cable, felt sharp pain, woke up with severe pain\". 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 He was seen on urgent care a few days ago where ultrasound was requested. He believes this originated after repetitive heavy lifting. He said he woke up the next morning and \"noticed my bellybutton sticking out\". He complains of moderate pain and sensitivity. No fever, chills, or other constitutional symptoms.   Objective Findings: Appearance: Well-developed, well-nourished.   Mental Status: Mood and Affect normal. Pleasant. Cooperative. Appropriate.   ENT: Oropharynx clear. Moist mucous membranes. Hearing normal.   Eyes: Pupils reactive. Conjunctiva normal. No scleral icterus.   Neck: Trachea Midline. No thyromegaly. No masses.  Cardiovascular: Normal rate. Regular rhythm. Normal heart sounds.   Chest: Effort normal. Breath sounds clear.   Skin: Skin is warm and dry. No rash.   Abdomen: Crum sized umbilical hernia with tenderness.     Pre-Existing Condition(s):     Assessment:   Condition Same    Status: Additional Care Required  Permanent Disability:No    Plan: Diagnostics    Diagnostics: Other (see comment)  "   Comments:  Ultrasound requested by urgent care on 7/17/2018.    Disability Information   Status: Released to Restricted Duty    From:  7/13/2018  Through: 8/9/2018 Restrictions are:     Physical Restrictions   Sitting:    Standing:    Stooping:    Bending:      Squatting:    Walking:    Climbing:    Pushing:      Pulling:    Other:    Reaching Above Shoulder (L):   Reaching Above Shoulder (R):       Reaching Below Shoulder (L):    Reaching Below Shoulder (R):      Not to exceed Weight Limits   Carrying(hrs):   Weight Limit(lb):   Lifting(hrs):   Weight  Limit(lb): < or = to 25 pounds   Comments: If claim denied, work restrictions are not applicable and worker should follow-up with personal physician.    Repetitive Actions   Hands: i.e. Fine Manipulations from Grasping:     Feet: i.e. Operating Foot Controls:     Driving / Operate Machinery:     Physician Name: Taiwo Berry M.D. Physician Signature: TAIWO Harrison M.D. e-Signature: Dr. Chase Banks, Medical Director   Clinic Name / Location: [address],   [address] [place] [id number] Clinic Phone Number: Dept: [phone]   Appointment Time: 10:05 Am Visit Start Time: 10:13 AM   Check-In Time:  10:02 Am Visit Discharge Time:  10:38am   Original-Treating Physician or Chiropractor    Page 2-Insurer/TPA    Page 3-Employer    Page 4-Employee

## 2018-08-09 ENCOUNTER — OCCUPATIONAL MEDICINE (OUTPATIENT)
Dept: OCCUPATIONAL MEDICINE | Facility: CLINIC | Age: 33
End: 2018-08-09
Payer: COMMERCIAL

## 2018-08-09 VITALS
BODY MASS INDEX: 31.04 KG/M2 | TEMPERATURE: 97.5 F | SYSTOLIC BLOOD PRESSURE: 128 MMHG | OXYGEN SATURATION: 95 % | HEART RATE: 89 BPM | DIASTOLIC BLOOD PRESSURE: 82 MMHG | HEIGHT: 64 IN | WEIGHT: 181.8 LBS

## 2018-08-09 DIAGNOSIS — K42.9 UMBILICAL HERNIA WITHOUT OBSTRUCTION AND WITHOUT GANGRENE: ICD-10-CM

## 2018-08-09 PROCEDURE — 99213 OFFICE O/P EST LOW 20 MIN: CPT | Performed by: PREVENTIVE MEDICINE

## 2018-08-09 ASSESSMENT — PAIN SCALES - GENERAL: PAINLEVEL: 7=MODERATE-SEVERE PAIN

## 2018-08-09 NOTE — PROGRESS NOTES
"Subjective:      Suresh Ho is a 33 y.o. male who presents with Follow-Up (WC DOI (07/05/2018) - umbilical hernia - worse- room 2)      Date of incident 7/5/2018. Incident-\"lifting spools of cable, felt sharp pain, woke up with severe pain\". 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 He was seen on urgent care a few days ago where ultrasound was requested. He believes this originated after repetitive heavy lifting.  Today, he reports he is worse with more pain and radiating pain.  He is aware that his claim has been denied and he has retained legal representation.     HPI    ROS  PFSH:  WORK STATUS: Restricted activity  PMH:  has no past medical history on file.  MEDS:   Current Outpatient Prescriptions:   •  Acetaminophen (TYLENOL) 325 MG Cap, Take  by mouth., Disp: , Rfl:   •  tizanidine (ZANAFLEX) 4 MG Tab, Take 1 Tab by mouth at bedtime as needed., Disp: 30 Tab, Rfl: 0  •  cetirizine (ZYRTEC) 10 MG Tab, Take  by mouth., Disp: , Rfl:   •  fluocinonide (LIDEX) 0.05 % Ointment, Apply  to affected area(s)., Disp: , Rfl:   •  fluticasone (FLONASE) 50 MCG/ACT nasal spray, Spray  in nose., Disp: , Rfl:   •  nabumetone (RELAFEN) 750 MG Tab, Take  by mouth., Disp: , Rfl:   •  nortriptyline (PAMELOR) 10 MG Cap, Take  by mouth., Disp: , Rfl:   •  ibuprofen (MOTRIN) 800 MG Tab, Take 1 Tab by mouth every 8 hours as needed., Disp: 30 Tab, Rfl: 1  •  sumatriptan (IMITREX) 50 MG Tab, Take 1 Tab by mouth Once PRN for Migraine for up to 1 dose. May take one tablet 2 hours after 1st if still with migraine., Disp: 10 Tab, Rfl: 3  •  desonide (TRIDESILON) 0.05 % Cream, Apply tid x 1 week, Disp: 30 g, Rfl: 1       Objective:     /82   Pulse 89   Temp 36.4 °C (97.5 °F)   Ht 1.626 m (5' 4\")   Wt 82.5 kg (181 lb 12.8 oz)   SpO2 95%   BMI 31.21 kg/m²      Physical Exam    Appearance: Well-developed, well-nourished.   Mental Status: Mood and Affect normal. Pleasant. Cooperative. Appropriate. "   Abdomen: Small umbilical hernia marble sized in dimension unchanged.       Assessment/Plan:     1. Umbilical hernia without obstruction and without gangrene  Claim denied  Transferred to general surgery on private basis  Released from care

## 2018-08-09 NOTE — LETTER
"[address],   [address] [place] [address]  Phone:  [phone] - Fax:  [phone]   [facility] Progress Report and Disability Certification  Date of Service: 8/9/2018   No Show:  No  Date / Time of Next Visit:  Claim denied-Transferred to personal general surgeon   Claim Information   Patient Name: Suresh Ho  Claim Number:     Employer:   [employer] Date of Injury: 7/5/2018     Insurer / TPA: [employer]  ID / SSN:     Occupation: [employer]  Diagnosis: The encounter diagnosis was Umbilical hernia without obstruction and without gangrene.    Medical Information   Related to Industrial Injury?   Comments:Claim denied    Subjective Complaints:  Date of incident 7/5/2018. Incident-\"lifting spools of cable, felt sharp pain, woke up with severe pain\". 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 He was seen on urgent care a few days ago where ultrasound was requested. He believes this originated after repetitive heavy lifting.  Today, he reports he is worse with more pain and radiating pain.  He is aware that his claim has been denied and he has retained legal representation.   Objective Findings: Appearance: Well-developed, well-nourished.   Mental Status: Mood and Affect normal. Pleasant. Cooperative. Appropriate.   Abdomen: Small umbilical hernia marble sized in dimension unchanged.   Pre-Existing Condition(s):     Assessment:   Condition Worsened    Status: Discharged / Care Transfer  Permanent Disability:No    Plan:      Diagnostics:      Comments:  Worker referred to [facility] on a private basis    Disability Information   Status: Released to Full Duty    From:  8/9/2018  Through:   Restrictions are:     Physical Restrictions   Sitting:    Standing:    Stooping:    Bending:      Squatting:    Walking:    Climbing:    Pushing:      Pulling:    Other:    Reaching Above Shoulder (L):   Reaching " Above Shoulder (R):       Reaching Below Shoulder (L):    Reaching Below Shoulder (R):      Not to exceed Weight Limits   Carrying(hrs):   Weight Limit(lb):   Lifting(hrs):   Weight  Limit(lb):     Comments: Claim denied-work restrictions no longer applicable    Repetitive Actions   Hands: i.e. Fine Manipulations from Grasping:     Feet: i.e. Operating Foot Controls:     Driving / Operate Machinery:     Physician Name: Taiwo Berry M.D. Physician Signature: TAIWO Harrison M.D. e-Signature: Dr. Chase Banks, Medical Director   Clinic Name / Location: [address],   [address] [place] [address], [place] [address] Clinic Phone Number: Dept: [phone]   Appointment Time: 2:30 Pm Visit Start Time: 2:17 PM   Check-In Time:  2:10 Pm Visit Discharge Time:     Original-Treating Physician or Chiropractor    Page 2-Insurer/TPA    Page 3-Employer    Page 4-Employee

## 2018-08-14 ENCOUNTER — OFFICE VISIT (OUTPATIENT)
Dept: MEDICAL GROUP | Facility: MEDICAL CENTER | Age: 33
End: 2018-08-14
Payer: COMMERCIAL

## 2018-08-14 VITALS
HEART RATE: 81 BPM | WEIGHT: 183.2 LBS | HEIGHT: 64 IN | DIASTOLIC BLOOD PRESSURE: 64 MMHG | BODY MASS INDEX: 31.28 KG/M2 | SYSTOLIC BLOOD PRESSURE: 112 MMHG | OXYGEN SATURATION: 100 % | TEMPERATURE: 98.3 F

## 2018-08-14 DIAGNOSIS — K42.9 UMBILICAL HERNIA WITHOUT OBSTRUCTION AND WITHOUT GANGRENE: ICD-10-CM

## 2018-08-14 PROBLEM — K92.1 HEMATOCHEZIA: Status: RESOLVED | Noted: 2017-07-17 | Resolved: 2018-08-14

## 2018-08-14 PROBLEM — R14.0 ABDOMINAL BLOATING: Status: RESOLVED | Noted: 2017-07-17 | Resolved: 2018-08-14

## 2018-08-14 PROBLEM — K21.9 GERD (GASTROESOPHAGEAL REFLUX DISEASE): Status: RESOLVED | Noted: 2017-07-17 | Resolved: 2018-08-14

## 2018-08-14 PROCEDURE — 99213 OFFICE O/P EST LOW 20 MIN: CPT | Performed by: PHYSICIAN ASSISTANT

## 2018-08-14 ASSESSMENT — PATIENT HEALTH QUESTIONNAIRE - PHQ9: CLINICAL INTERPRETATION OF PHQ2 SCORE: 0

## 2018-08-15 NOTE — ASSESSMENT & PLAN NOTE
This is a 33-year-old male who is here today to discuss an umbilical hernia which is causing him significant pain with pain 7 out of 10 at times. Pain radiates down the stomach. Denies any current fevers. 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 He states he felt a sharp pain while squatting and lifting up pain was located in his belly button area. The next day his umbilical area was protruding. He was able to push it back. Since then he's been trying to get a Workmen's Comp. comp case going but Workmen's Comp. was denied. He is currently trying to fight through legal means. In any event he is requesting an ultrasound today and order at least and he [facility]. Also wants to see [facility] for a referral.

## 2018-08-17 ENCOUNTER — HOSPITAL ENCOUNTER (OUTPATIENT)
Dept: RADIOLOGY | Facility: MEDICAL CENTER | Age: 33
End: 2018-08-17
Attending: PHYSICIAN ASSISTANT
Payer: COMMERCIAL

## 2018-08-17 DIAGNOSIS — R10.33 UMBILICAL PAIN: ICD-10-CM

## 2018-08-17 PROCEDURE — 76705 ECHO EXAM OF ABDOMEN: CPT

## 2018-09-05 ENCOUNTER — APPOINTMENT (OUTPATIENT)
Dept: ADMISSIONS | Facility: MEDICAL CENTER | Age: 33
End: 2018-09-05
Attending: SURGERY
Payer: COMMERCIAL

## 2018-09-05 DIAGNOSIS — Z01.812 PRE-OPERATIVE LABORATORY EXAMINATION: ICD-10-CM

## 2018-09-05 RX ORDER — ACETAMINOPHEN 500 MG
500-1000 TABLET ORAL EVERY 6 HOURS PRN
COMMUNITY
End: 2021-07-08

## 2018-09-12 ENCOUNTER — HOSPITAL ENCOUNTER (OUTPATIENT)
Facility: MEDICAL CENTER | Age: 33
End: 2018-09-12
Attending: SURGERY | Admitting: SURGERY
Payer: COMMERCIAL

## 2018-09-12 VITALS
HEIGHT: 64 IN | WEIGHT: 175.93 LBS | BODY MASS INDEX: 30.04 KG/M2 | DIASTOLIC BLOOD PRESSURE: 69 MMHG | SYSTOLIC BLOOD PRESSURE: 118 MMHG | RESPIRATION RATE: 16 BRPM | OXYGEN SATURATION: 97 % | TEMPERATURE: 97.2 F

## 2018-09-12 DIAGNOSIS — G89.18 POST-OPERATIVE PAIN: ICD-10-CM

## 2018-09-12 PROBLEM — K42.9 UMBILICAL HERNIA WITHOUT OBSTRUCTION AND WITHOUT GANGRENE: Status: RESOLVED | Noted: 2018-08-14 | Resolved: 2018-09-12

## 2018-09-12 PROCEDURE — 700111 HCHG RX REV CODE 636 W/ 250 OVERRIDE (IP)

## 2018-09-12 PROCEDURE — 501838 HCHG SUTURE GENERAL: Performed by: SURGERY

## 2018-09-12 PROCEDURE — 160027 HCHG SURGERY MINUTES - 1ST 30 MINS LEVEL 2: Performed by: SURGERY

## 2018-09-12 PROCEDURE — 160025 RECOVERY II MINUTES (STATS): Performed by: SURGERY

## 2018-09-12 PROCEDURE — 700101 HCHG RX REV CODE 250

## 2018-09-12 PROCEDURE — 700105 HCHG RX REV CODE 258: Performed by: SURGERY

## 2018-09-12 PROCEDURE — 160002 HCHG RECOVERY MINUTES (STAT): Performed by: SURGERY

## 2018-09-12 PROCEDURE — 160048 HCHG OR STATISTICAL LEVEL 1-5: Performed by: SURGERY

## 2018-09-12 PROCEDURE — 160035 HCHG PACU - 1ST 60 MINS PHASE I: Performed by: SURGERY

## 2018-09-12 PROCEDURE — 700102 HCHG RX REV CODE 250 W/ 637 OVERRIDE(OP)

## 2018-09-12 PROCEDURE — 160046 HCHG PACU - 1ST 60 MINS PHASE II: Performed by: SURGERY

## 2018-09-12 PROCEDURE — 160047 HCHG PACU  - EA ADDL 30 MINS PHASE II: Performed by: SURGERY

## 2018-09-12 PROCEDURE — A9270 NON-COVERED ITEM OR SERVICE: HCPCS

## 2018-09-12 PROCEDURE — 160009 HCHG ANES TIME/MIN: Performed by: SURGERY

## 2018-09-12 RX ORDER — SODIUM CHLORIDE, SODIUM LACTATE, POTASSIUM CHLORIDE, CALCIUM CHLORIDE 600; 310; 30; 20 MG/100ML; MG/100ML; MG/100ML; MG/100ML
INJECTION, SOLUTION INTRAVENOUS
Status: DISCONTINUED | OUTPATIENT
Start: 2018-09-12 | End: 2018-09-12 | Stop reason: HOSPADM

## 2018-09-12 RX ORDER — HYDROCODONE BITARTRATE AND ACETAMINOPHEN 5; 325 MG/1; MG/1
1-2 TABLET ORAL EVERY 4 HOURS PRN
Qty: 30 TAB | Refills: 0 | Status: SHIPPED | OUTPATIENT
Start: 2018-09-12 | End: 2018-09-19

## 2018-09-12 RX ORDER — OXYCODONE HCL 5 MG/5 ML
SOLUTION, ORAL ORAL
Status: COMPLETED
Start: 2018-09-12 | End: 2018-09-12

## 2018-09-12 RX ORDER — BUPIVACAINE HYDROCHLORIDE AND EPINEPHRINE 5; 5 MG/ML; UG/ML
INJECTION, SOLUTION PERINEURAL
Status: DISCONTINUED | OUTPATIENT
Start: 2018-09-12 | End: 2018-09-12 | Stop reason: HOSPADM

## 2018-09-12 RX ORDER — CEFAZOLIN SODIUM 1 G/3ML
INJECTION, POWDER, FOR SOLUTION INTRAMUSCULAR; INTRAVENOUS
Status: DISCONTINUED | OUTPATIENT
Start: 2018-09-12 | End: 2018-09-12 | Stop reason: HOSPADM

## 2018-09-12 RX ADMIN — SODIUM CHLORIDE, POTASSIUM CHLORIDE, SODIUM LACTATE AND CALCIUM CHLORIDE: 600; 310; 30; 20 INJECTION, SOLUTION INTRAVENOUS at 07:25

## 2018-09-12 RX ADMIN — FENTANYL CITRATE 25 MCG: 50 INJECTION, SOLUTION INTRAMUSCULAR; INTRAVENOUS at 09:22

## 2018-09-12 RX ADMIN — OXYCODONE HYDROCHLORIDE 10 MG: 5 SOLUTION ORAL at 09:20

## 2018-09-12 ASSESSMENT — PAIN SCALES - GENERAL
PAINLEVEL_OUTOF10: 2
PAINLEVEL_OUTOF10: ASSUMED PAIN PRESENT
PAINLEVEL_OUTOF10: 7
PAINLEVEL_OUTOF10: ASSUMED PAIN PRESENT
PAINLEVEL_OUTOF10: 4

## 2018-09-12 NOTE — PROGRESS NOTES
Narcotic  check, risk stratification, and patient informed consent undertaken in the office, does not need to be repeated in hospital setting.    Katherine Brennan M.D.  [facility]  [phone]

## 2018-09-12 NOTE — OR NURSING
0848 Pt to PACU from OR via gurney, respirations spontaneous and non-labored via OPA. Abdominal surgical sites clean, dry and intact, pt not arousable on calling, VSS.  0900 No changes, VSS.   0912 OPA D/C'd   0915 Pt arousable on calling, reports pain 7/10 denies nausea, VSS. Plan to medicate.   0920 Oral analgesia administered.  0922 IV analgesia administered.   0930 Pt reports pain persists, VSS. Plan to medicate.   0932 IV analgesia administered.   0945 Pt reports pain tolerable now 4/10 denies nausea. Pt meets criteria for transfer to stage II. Report to LUCRECIA Bhatt.   0963 Pt transferred to stage II.

## 2018-09-12 NOTE — DISCHARGE INSTRUCTIONS
ACTIVITY: Rest and take it easy for the first 24 hours.  A responsible adult is recommended to remain with you during that time.  It is normal to feel sleepy.  We encourage you to not do anything that requires balance, judgment or coordination.    MILD FLU-LIKE SYMPTOMS ARE NORMAL. YOU MAY EXPERIENCE GENERALIZED MUSCLE ACHES, THROAT IRRITATION, HEADACHE AND/OR SOME NAUSEA.    FOR 24 HOURS DO NOT:  Drive, operate machinery or run household appliances.  Drink beer or alcoholic beverages.   Make important decisions or sign legal documents.    SPECIAL INSTRUCTIONS:     Hernia Repair Discharge Instructions:     1. ACTIVITIES: Upon discharge from the hospital, the day of surgery it is requested that you do no significant physical activity and limit mental activities, as you have had sedation. The day after surgery, you may resume activities of daily living, but for four weeks, it is recommended that you do no strenuous activities or heavy lifting (greater than 15 pounds).      2. DRIVING: You may drive whenever you are off pain medications and are able to perform the activities needed to drive, i.e. turning, bending, twisting, etc.      3. WOUND: It is not unusual for patients to experience swelling and even bruising at the hernia repair site.      4. ICE: please use ice on the wound to decrease the swelling for the first 24 hours and then discontinue.      5. BATHING: The dressing can be removed two days after surgery and the wound can then be wetted in a shower as normal, but avoid submersion in water (tub bath) for at least a week.     6. PAIN MEDICATION: You will be given a prescription for pain medication at discharge. Please take these as directed. It is important to remember not to take medications on an empty stomach as this may cause nausea.      7. BOWEL FUNCTION: After hernia repair, it is not uncommon for patients to experience constipation. This is due to decreasing activity levels as well as pain  medications. You may wish to use a stool softener beginning immediately after surgery, and you may or may not need to use a laxative (Milk of Magnesia, Ex-lax; Senokot, etc.) as well.             8 .CALL IF YOU HAVE: (1) Fevers to more than 1010 F, (2) Unusual chest or leg pain, (3) Drainage or fluid from incision that may be foul smelling, increased tenderness or soreness at the wound or the wound edges are no longer together,redness or swelling at the incision site. Please do not hesitate to call with any other questions.      9. APPOINTMENT: Contact our office at [phone] for a follow-up appointment in 1 to 2 weeks following your procedure.      If you have any additional questions, please do not hesitate to call the office and speak to either myself or the physician on call.      Office address:  [address] [place] [address] [address]  [place] [address]     Katherine Brennan M.D.  [facility]  [phone]    DIET: To avoid nausea, slowly advance diet as tolerated, avoiding spicy or greasy foods for the first day.  Add more substantial food to your diet according to your physician's instructions.  Babies can be fed formula or breast milk as soon as they are hungry.  INCREASE FLUIDS AND FIBER TO AVOID CONSTIPATION.    FOLLOW-UP APPOINTMENT:  A follow-up appointment should be arranged with your doctor in 1-2 weeks; call to schedule.    You should CALL YOUR PHYSICIAN if you develop:  Fever greater than 101 degrees F.  Pain not relieved by medication, or persistent nausea or vomiting.  Excessive bleeding (blood soaking through dressing) or unexpected drainage from the wound.  Extreme redness or swelling around the incision site, drainage of pus or foul smelling drainage.  Inability to urinate or empty your bladder within 8 hours.  Problems with breathing or chest pain.    You should call 911 if you develop problems with breathing or chest pain.  If you are unable to contact your doctor or surgical center, you should go  to the nearest emergency room or urgent care center.  Physician's telephone #: [phone]    If any questions arise, call your doctor.  If your doctor is not available, please feel free to call the Surgical Center at [phone].  The Center is open Monday through Friday from 7AM to 7PM.  You can also call the HEALTH HOTLINE open 24 hours/day, 7 days/week and speak to a nurse at [phone], or toll free at [phone].    A registered nurse may call you a few days after your surgery to see how you are doing after your procedure.    MEDICATIONS: Resume taking daily medication.  Take prescribed pain medication with food.  If no medication is prescribed, you may take non-aspirin pain medication if needed.  PAIN MEDICATION CAN BE VERY CONSTIPATING.  Take a stool softener or laxative such as senokot, pericolace, or milk of magnesia if needed.    Prescription given for Norco.  Last pain medication given at 9:20am Oxycodone 10mg.    If your physician has prescribed pain medication that includes Acetaminophen (Tylenol), do not take additional Acetaminophen (Tylenol) while taking the prescribed medication.    Depression / Suicide Risk    As you are discharged from this [facility] facility, it is important to learn how to keep safe from harming yourself.    Recognize the warning signs:  · Abrupt changes in personality, positive or negative- including increase in energy   · Giving away possessions  · Change in eating patterns- significant weight changes-  positive or negative  · Change in sleeping patterns- unable to sleep or sleeping all the time   · Unwillingness or inability to communicate  · Depression  · Unusual sadness, discouragement and loneliness  · Talk of wanting to die  · Neglect of personal appearance   · Rebelliousness- reckless behavior  · Withdrawal from people/activities they love  · Confusion- inability to concentrate     If you or a loved one observes any of these behaviors or has concerns  about self-harm, here's what you can do:  · Talk about it- your feelings and reasons for harming yourself  · Remove any means that you might use to hurt yourself (examples: pills, rope, extension cords, firearm)  · Get professional help from the community (Mental Health, Substance Abuse, psychological counseling)  · Do not be alone:Call your Safe Contact- someone whom you trust who will be there for you.  · Call your local CRISIS HOTLINE 065-8557 or 394-460-6785  · Call your local Children's Mobile Crisis Response Team [place] (540) 907-3793 or www.[employer]at  · Call the toll free National Suicide Prevention Hotlines   · National Suicide Prevention Lifeline 529-297-VPEF (4707)  · National Hope Line Network 800-SUICIDE (871-0962)

## 2018-09-12 NOTE — OR NURSING
No problems in PACU 2. Tegaderm and gauze clean and dry.  OOB to BR to void QS. OOB tolerated well.

## 2018-09-12 NOTE — PROGRESS NOTES
Hernia Repair Discharge Instructions:    1. ACTIVITIES: Upon discharge from the hospital, the day of surgery it is requested that you do no significant physical activity and limit mental activities, as you have had sedation. The day after surgery, you may resume activities of daily living, but for four weeks, it is recommended that you do no strenuous activities or heavy lifting (greater than 15 pounds).     2. DRIVING: You may drive whenever you are off pain medications and are able to perform the activities needed to drive, i.e. turning, bending, twisting, etc.     3. WOUND: It is not unusual for patients to experience swelling and even bruising at the hernia repair site.     4. ICE: please use ice on the wound to decrease the swelling for the first 24 hours and then discontinue.     5. BATHING: The dressing can be removed two days after surgery and the wound can then be wetted in a shower as normal, but avoid submersion in water (tub bath) for at least a week.    6. PAIN MEDICATION: You will be given a prescription for pain medication at discharge. Please take these as directed. It is important to remember not to take medications on an empty stomach as this may cause nausea.     7. BOWEL FUNCTION: After hernia repair, it is not uncommon for patients to experience constipation. This is due to decreasing activity levels as well as pain medications. You may wish to use a stool softener beginning immediately after surgery, and you may or may not need to use a laxative (Milk of Magnesia, Ex-lax; Senokot, etc.) as well.            8 .CALL IF YOU HAVE: (1) Fevers to more than 1010 F, (2) Unusual chest or leg pain, (3) Drainage or fluid from incision that may be foul smelling, increased tenderness or soreness at the wound or the wound edges are no longer together,redness or swelling at the incision site. Please do not hesitate to call with any other questions.     9. APPOINTMENT: Contact our office at [phone] for a  follow-up appointment in 1 to 2 weeks following your procedure.     If you have any additional questions, please do not hesitate to call the office and speak to either myself or the physician on call.     Office address:  [address] [place] [address] [id number]  [place] [address]      Katherine Brennan M.D.  [facility]  [phone]

## 2018-09-12 NOTE — OR NURSING
0658: Brought patient back to pre-op and assumed care.  0732: Patient allergies and NPO status verified, home medication reconciliation completed and belongings secured. Patient verbalizes understanding of pain scale, expected course of stay and plan of care. Surgical site verified with patient. IV access established. Sequentials placed on legs.

## 2018-10-19 NOTE — PROGRESS NOTES
"Subjective:   Suresh Ho is a 33 y.o. male here today for umbilical hernia for over one month.    Umbilical hernia without obstruction and without gangrene  This is a 33-year-old male who is here today to discuss an umbilical hernia which is causing him significant pain with pain 7 out of 10 at times. Pain radiates down the stomach. Denies any current fevers. 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 He states he felt a sharp pain while squatting and lifting up pain was located in his belly button area. The next day his umbilical area was protruding. He was able to push it back. Since then he's been trying to get a Workmen's Comp. comp case going but Workmen's Comp. was denied. He is currently trying to fight through legal means. In any event he is requesting an ultrasound today and order at least and he has an appointment on the 20th at [facility]. Also wants to see [facility] for a referral.       Current medicines (including changes today)  Current Outpatient Prescriptions   Medication Sig Dispense Refill   • tizanidine (ZANAFLEX) 4 MG Tab Take 1 Tab by mouth at bedtime as needed. 30 Tab 0   • ibuprofen (MOTRIN) 800 MG Tab Take 1 Tab by mouth every 8 hours as needed. 30 Tab 1     No current facility-administered medications for this visit.      He  has no past medical history on file.    Social History and Family History were reviewed and updated.    ROS   No chest pain, no shortness of breath and all other systems were reviewed and are negative.       Objective:     Blood pressure 112/64, pulse 81, temperature 36.8 °C (98.3 °F), height 1.626 m (5' 4\"), weight 83.1 kg (183 lb 3.2 oz), SpO2 100 %. Body mass index is 31.45 kg/m².   Physical Exam:  Constitutional: Alert, no distress.  Skin: Warm, dry, good turgor, no rashes in visible areas.  Eye: Equal, round and reactive, conjunctiva clear, lids normal.  ENMT: Lips without lesions, good dentition, " oropharynx clear.  Neck: Trachea midline, no masses.   Lymph: No cervical or supraclavicular lymphadenopathy  Respiratory: Unlabored respiratory effort, lungs appear clear, no wheezes.  Cardiovascular: Normal S1, S2, no murmur, no edema.  Abdomen: Soft, non-tender, tenderness noted in the umbilical area. Slight herniation noted. Tender and reducible. Non-erythematous.  Psych: Alert and oriented x3, normal affect and mood.        Assessment and Plan:   The following treatment plan was discussed    1. Umbilical hernia without obstruction and without gangrene  Acute, new onset condition. Order ultrasound and referred to general surgery. [facility]. Follow-up as needed.  - US-HERNIA ABDOMEN; Future  - REFERRAL TO GENERAL SURGERY    Patient was seen for 15 minutes face to face of which > 50% of appointment time was spent on counseling and coordination of care regarding the above.    Followup: Return if symptoms worsen or fail to improve.    Please note that this dictation was created using voice recognition software. I have made every reasonable attempt to correct obvious errors, but I expect that there are errors of grammar and possibly content that I did not discover before finalizing the note.            no

## 2019-12-26 ENCOUNTER — APPOINTMENT (OUTPATIENT)
Dept: RADIOLOGY | Facility: IMAGING CENTER | Age: 34
End: 2019-12-26
Attending: PHYSICIAN ASSISTANT
Payer: COMMERCIAL

## 2019-12-26 ENCOUNTER — OFFICE VISIT (OUTPATIENT)
Dept: URGENT CARE | Facility: CLINIC | Age: 34
End: 2019-12-26
Payer: COMMERCIAL

## 2019-12-26 VITALS
WEIGHT: 175.71 LBS | OXYGEN SATURATION: 97 % | BODY MASS INDEX: 30 KG/M2 | HEART RATE: 60 BPM | HEIGHT: 64 IN | RESPIRATION RATE: 16 BRPM | DIASTOLIC BLOOD PRESSURE: 78 MMHG | TEMPERATURE: 98.3 F | SYSTOLIC BLOOD PRESSURE: 112 MMHG

## 2019-12-26 DIAGNOSIS — M79.671 RIGHT FOOT PAIN: ICD-10-CM

## 2019-12-26 LAB — GLUCOSE BLD-MCNC: 99 MG/DL (ref 70–100)

## 2019-12-26 PROCEDURE — 82962 GLUCOSE BLOOD TEST: CPT | Performed by: PHYSICIAN ASSISTANT

## 2019-12-26 PROCEDURE — 73630 X-RAY EXAM OF FOOT: CPT | Mod: TC,RT | Performed by: PHYSICIAN ASSISTANT

## 2019-12-26 PROCEDURE — 99214 OFFICE O/P EST MOD 30 MIN: CPT | Performed by: PHYSICIAN ASSISTANT

## 2019-12-26 RX ORDER — MELOXICAM 15 MG/1
15 TABLET ORAL DAILY
Qty: 14 TAB | Refills: 0 | Status: SHIPPED | OUTPATIENT
Start: 2019-12-26 | End: 2020-01-09

## 2019-12-26 ASSESSMENT — ENCOUNTER SYMPTOMS
HEADACHES: 0
NECK PAIN: 0
BLURRED VISION: 0
BRUISES/BLEEDS EASILY: 0
POLYDIPSIA: 0
BACK PAIN: 0
CHILLS: 0
SENSORY CHANGE: 0
CLAUDICATION: 0
SHORTNESS OF BREATH: 0
VOMITING: 0
MYALGIAS: 1
FALLS: 0
DIZZINESS: 0
WEAKNESS: 0
FEVER: 0
COUGH: 0
NAUSEA: 0

## 2019-12-26 NOTE — PROGRESS NOTES
"Subjective:      Suresh Ho is a 34 y.o. male who presents with Foot Problem (Calf/Heal pain x6 months, Pt states he feels \"knots\" in his heal, mild relief with epsom salt baths/roller devices. Pt states he has tried OTC NSAIDs with no relief. )            HPI  34-year-old male presents to urgent care with new problem of right heel pain onset 6 months ago.  Pain radiates up right calf.  Patient reports mild swelling of right foot.  He denies known injury.  Patient states he has been using roller device for plantar fasciitis, Epsom salt baths and over-the-counter NSAIDs with minimal improvement.  Patient denies numbness or tingling in right lower extremity.  He has not been seen for this problem before.  Patient reports he does a moderate amount of standing and walking while at work.  Patient states pain is worse with ambulation.  He denies calf pain, unilateral lower extremity swelling, shortness of breath or chest pain.    Review of Systems   Constitutional: Negative for chills and fever.   HENT: Negative for congestion.    Eyes: Negative for blurred vision.   Respiratory: Negative for cough and shortness of breath.    Cardiovascular: Negative for chest pain, claudication and leg swelling.   Gastrointestinal: Negative for nausea and vomiting.   Genitourinary: Negative for dysuria.   Musculoskeletal: Positive for myalgias. Negative for back pain, falls, joint pain and neck pain.        Right heel and foot pain   Skin: Negative for itching and rash.   Neurological: Negative for dizziness, sensory change, weakness and headaches.   Endo/Heme/Allergies: Negative for polydipsia. Does not bruise/bleed easily.       History reviewed. No pertinent past medical history.  Current Outpatient Medications on File Prior to Visit   Medication Sig Dispense Refill   • Multiple Vitamins-Minerals (MULTIVITAMIN ADULT PO) Take  by mouth every day.     • acetaminophen (TYLENOL) 500 MG Tab Take 500-1,000 mg by mouth every 6 " "hours as needed.       No current facility-administered medications on file prior to visit.      Patient has no known allergies.  Social History     Tobacco Use   • Smoking status: Former Smoker     Packs/day: 0.00     Types: Cigars     Last attempt to quit: 2017     Years since quittin.3   • Smokeless tobacco: Current User   • Tobacco comment: once every six months; 2018 E Cig every 4-5 months   Substance Use Topics   • Alcohol use: Yes     Comment: once a month      Objective:     /78   Pulse 60   Temp 36.8 °C (98.3 °F) (Temporal)   Resp 16   Ht 1.626 m (5' 4\")   Wt 79.7 kg (175 lb 11.3 oz)   SpO2 97%   BMI 30.16 kg/m²      Physical Exam  Vitals signs reviewed.   Constitutional:       General: He is not in acute distress.     Appearance: Normal appearance. He is well-developed. He is not ill-appearing.   HENT:      Head: Normocephalic and atraumatic.      Mouth/Throat:      Mouth: Mucous membranes are moist.      Pharynx: Oropharynx is clear.   Eyes:      Extraocular Movements: Extraocular movements intact.      Conjunctiva/sclera: Conjunctivae normal.   Neck:      Musculoskeletal: Normal range of motion and neck supple.   Cardiovascular:      Rate and Rhythm: Normal rate.   Pulmonary:      Effort: Pulmonary effort is normal. No respiratory distress.   Musculoskeletal:         General: No deformity or signs of injury.      Right lower leg: No edema.        Feet:    Skin:     General: Skin is warm and dry.      Capillary Refill: Capillary refill takes less than 2 seconds.      Findings: No erythema.   Neurological:      General: No focal deficit present.      Mental Status: He is alert and oriented to person, place, and time.      Sensory: No sensory deficit.      Motor: No weakness.      Gait: Gait normal.   Psychiatric:         Mood and Affect: Mood normal.         Behavior: Behavior normal.         Thought Content: Thought content normal.         Judgment: Judgment normal.               "   Assessment/Plan:     1. Right foot pain  DX-FOOT-COMPLETE 3+ RIGHT    POCT Glucose    meloxicam (MOBIC) 15 MG tablet    REFERRAL TO SPORTS MEDICINE     Point of care glucose: 99  Patient states positive family history of type 2 diabetes, he states he is worried his foot pain may be related to this.    Narrative & Impression        12/26/2019 10:05 AM     HISTORY/REASON FOR EXAM:  Atraumatic Pain/Swelling/Deformity.        TECHNIQUE/EXAM DESCRIPTION:  3 views RIGHT foot.     COMPARISON:  None.     FINDINGS:  No acute fracture or dislocation identified.     IMPRESSION:     No acute fracture identified.       Patient given 14-day trial of meloxicam.  Take medication with food and avoid other NSAIDs while taking this medication.  Patient may take Tylenol for breakthrough pain.  Continue with roller device, Epsom salt baths and good arch support in shoes for symptomatic relief.  Patient given referral to sports medicine for further evaluation and medical treatment plan if pain persists.  Differential diagnosis includes stress fracture and plantar fasciitis.  Patient verbalized understanding of treatment plan and has no further questions regarding care.

## 2020-10-15 ENCOUNTER — OFFICE VISIT (OUTPATIENT)
Dept: MEDICAL GROUP | Facility: MEDICAL CENTER | Age: 35
End: 2020-10-15
Payer: COMMERCIAL

## 2020-10-15 VITALS
DIASTOLIC BLOOD PRESSURE: 74 MMHG | SYSTOLIC BLOOD PRESSURE: 132 MMHG | WEIGHT: 170 LBS | BODY MASS INDEX: 29.02 KG/M2 | OXYGEN SATURATION: 98 % | HEIGHT: 64 IN | TEMPERATURE: 97.1 F | RESPIRATION RATE: 16 BRPM | HEART RATE: 66 BPM

## 2020-10-15 DIAGNOSIS — R21 RASH: ICD-10-CM

## 2020-10-15 DIAGNOSIS — K59.00 CONSTIPATION, UNSPECIFIED CONSTIPATION TYPE: ICD-10-CM

## 2020-10-15 DIAGNOSIS — R14.0 ABDOMINAL BLOATING: ICD-10-CM

## 2020-10-15 PROBLEM — E66.9 OBESITY (BMI 30-39.9): Status: ACTIVE | Noted: 2020-10-15

## 2020-10-15 PROBLEM — S46.919A STRAIN OF SHOULDER: Status: RESOLVED | Noted: 2018-05-22 | Resolved: 2020-10-15

## 2020-10-15 PROBLEM — M25.50 PAIN IN JOINTS: Status: RESOLVED | Noted: 2017-07-17 | Resolved: 2020-10-15

## 2020-10-15 PROBLEM — S39.012A STRAIN OF LUMBAR REGION: Status: RESOLVED | Noted: 2018-05-22 | Resolved: 2020-10-15

## 2020-10-15 PROBLEM — S60.211A CONTUSION OF RIGHT WRIST: Status: RESOLVED | Noted: 2018-05-22 | Resolved: 2020-10-15

## 2020-10-15 PROBLEM — F07.81 POSTCONCUSSION SYNDROME: Status: RESOLVED | Noted: 2018-05-22 | Resolved: 2020-10-15

## 2020-10-15 PROBLEM — Z00.00 PREVENTATIVE HEALTH CARE: Status: RESOLVED | Noted: 2017-07-17 | Resolved: 2020-10-15

## 2020-10-15 PROBLEM — E66.9 OBESITY (BMI 30-39.9): Status: RESOLVED | Noted: 2020-10-15 | Resolved: 2020-10-15

## 2020-10-15 PROBLEM — S16.1XXA CERVICAL STRAIN: Status: RESOLVED | Noted: 2018-05-22 | Resolved: 2020-10-15

## 2020-10-15 PROBLEM — G43.909 MIGRAINES: Status: RESOLVED | Noted: 2017-07-17 | Resolved: 2020-10-15

## 2020-10-15 PROCEDURE — 99214 OFFICE O/P EST MOD 30 MIN: CPT | Performed by: PHYSICIAN ASSISTANT

## 2020-10-15 RX ORDER — OMEPRAZOLE 20 MG/1
20 CAPSULE, DELAYED RELEASE ORAL DAILY
Qty: 30 CAP | Refills: 1 | Status: SHIPPED | OUTPATIENT
Start: 2020-10-15 | End: 2021-07-08

## 2020-10-15 RX ORDER — TRIAMCINOLONE ACETONIDE 5 MG/G
CREAM TOPICAL
Qty: 60 G | Refills: 0 | Status: SHIPPED | OUTPATIENT
Start: 2020-10-15 | End: 2021-07-08

## 2020-10-15 ASSESSMENT — PATIENT HEALTH QUESTIONNAIRE - PHQ9: CLINICAL INTERPRETATION OF PHQ2 SCORE: 0

## 2020-10-15 NOTE — ASSESSMENT & PLAN NOTE
Also complains of a change in bowel habits.  Has been feeling more constipated.  He will sit down to use the restroom and not be able to defecate.  He will then get up and will have the urge.  Sit down again have a small bowel movement and then have to return again later.  Denies any rectal bleeding.  Denies any lower abdominal pain.  States that he had labs recently performed approximately 2 months ago for life insurance purposes.  States that his labs were good.

## 2020-10-15 NOTE — ASSESSMENT & PLAN NOTE
This is a pleasant 35-year-old male here today to discuss some recent concerns that have developed over the past 2 months.  Complains of abdominal bloating with associated heartburn and occasional reflux.  States that his diet has changed over the past few months.  Eats out more regularly.  He also has mild concerns about getting full quicker.  Denies any nausea or vomiting.  No abdominal pain.

## 2020-10-15 NOTE — PROGRESS NOTES
Subjective:   Suresh Ho is a 35 y.o. male here today for kay bloating, constipation and rash.    Abdominal bloating  This is a pleasant 35-year-old male here today to discuss some recent concerns that have developed over the past 2 months.  Complains of abdominal bloating with associated heartburn and occasional reflux.  States that his diet has changed over the past few months.  Eats out more regularly.  He also has mild concerns about getting full quicker.  Denies any nausea or vomiting.  No abdominal pain.    Constipation  Also complains of a change in bowel habits.  Has been feeling more constipated.  He will sit down to use the restroom and not be able to defecate.  He will then get up and will have the urge.  Sit down again have a small bowel movement and then have to return again later.  Denies any rectal bleeding.  Denies any lower abdominal pain.  States that he had labs recently performed approximately 2 months ago for life insurance purposes.  States that his labs were good.    Rash  Lastly complains of a rash on his right forearm.  Complains of itching.  Uses over-the-counter hydrocortisone 1% cream as needed.  Denies any history of eczema or psoriasis.       Current medicines (including changes today)  Current Outpatient Medications   Medication Sig Dispense Refill   • omeprazole (PRILOSEC) 20 MG delayed-release capsule Take 1 Cap by mouth every day. Take 1/2 hour prior to same meal. 30 Cap 1   • triamcinolone acetonide (KENALOG) 0.5 % Cream Apply 2-3 times daily. 60 g 0   • Multiple Vitamins-Minerals (MULTIVITAMIN ADULT PO) Take  by mouth every day.     • acetaminophen (TYLENOL) 500 MG Tab Take 500-1,000 mg by mouth every 6 hours as needed.       No current facility-administered medications for this visit.      He  has no past medical history on file.    Social History and Family History were reviewed and updated.    ROS  No chest pain, no shortness of breath, no abdominal pain and all  "other systems were reviewed and are negative.       Objective:     /74 (BP Location: Left arm, Patient Position: Sitting, BP Cuff Size: Adult)   Pulse 66   Temp 36.2 °C (97.1 °F)   Resp 16   Ht 1.626 m (5' 4\")   Wt 77.1 kg (170 lb)   SpO2 98%  Body mass index is 29.18 kg/m².   Physical Exam:  Constitutional: Alert, no distress.  Skin: Warm, dry, good turgor, no rashes in visible areas.  Eye: Equal, round and reactive, conjunctiva clear, lids normal.  ENMT: Lips without lesions, good dentition, oropharynx clear.  Neck: Trachea midline, no masses.   Lymph: No cervical or supraclavicular lymphadenopathy  Respiratory: Unlabored respiratory effort, lungs appear clear, no wheezes.  Cardiovascular: Regular rate rhythm.  Psych: Alert and oriented x3, normal affect and mood.        Assessment and Plan:   The following treatment plan was discussed    1. Abdominal bloating  Acute, new onset condition.  Symptoms likely secondary to dietary changes.  Advised to make some lifestyle modifications with dietary habits.  Eat less fast foods.  We will give him a trial for 2 weeks of omeprazole.  Take for 2 weeks then stop and see what may cause triggers.  Refrain from those triggers.  Advised if symptoms of early satiety worsen with associated abdominal pain and nausea vomiting he is to contact me through [facility].  - omeprazole (PRILOSEC) 20 MG delayed-release capsule; Take 1 Cap by mouth every day. Take 1/2 hour prior to same meal.  Dispense: 30 Cap; Refill: 1    2. Constipation, unspecified constipation type  Acute, new onset condition.  Discussed increasing dietary fiber.  Possibly adding Metamucil as a bulking agent.  He also mentioned about his urinary stream being sporadic.  Advised to contact me if either 1 of these issues becomes worse.    3. Rash  Acute, new onset condition.  Firm with any visual rash but there are subcutaneous changes of purple which could be secondary to vasculitis but unlikely.  Asymptomatic.  " Could be secondary to trauma from pruritus.  Righted triamcinolone cream as directed.  Contact me if symptoms not improving.  - triamcinolone acetonide (KENALOG) 0.5 % Cream; Apply 2-3 times daily.  Dispense: 60 g; Refill: 0    Also advised him to send to me through my chart his recent labs ordered by the insurance company.  He understands that I would have ordered labs given his symptoms currently.    Followup: Return if symptoms worsen or fail to improve.    Please note that this dictation was created using voice recognition software. I have made every reasonable attempt to correct obvious errors, but I expect that there are errors of grammar and possibly content that I did not discover before finalizing the note.

## 2020-10-15 NOTE — ASSESSMENT & PLAN NOTE
Lastly complains of a rash on his right forearm.  Complains of itching.  Uses over-the-counter hydrocortisone 1% cream as needed.  Denies any history of eczema or psoriasis.

## 2020-10-18 DIAGNOSIS — R21 RASH: ICD-10-CM

## 2021-07-08 ENCOUNTER — APPOINTMENT (OUTPATIENT)
Dept: RADIOLOGY | Facility: MEDICAL CENTER | Age: 36
End: 2021-07-08
Attending: EMERGENCY MEDICINE
Payer: COMMERCIAL

## 2021-07-08 ENCOUNTER — HOSPITAL ENCOUNTER (OUTPATIENT)
Facility: MEDICAL CENTER | Age: 36
End: 2021-07-09
Attending: EMERGENCY MEDICINE | Admitting: INTERNAL MEDICINE
Payer: COMMERCIAL

## 2021-07-08 ENCOUNTER — APPOINTMENT (OUTPATIENT)
Dept: CARDIOLOGY | Facility: MEDICAL CENTER | Age: 36
End: 2021-07-08
Attending: NURSE PRACTITIONER
Payer: COMMERCIAL

## 2021-07-08 ENCOUNTER — APPOINTMENT (OUTPATIENT)
Dept: RADIOLOGY | Facility: MEDICAL CENTER | Age: 36
End: 2021-07-08
Attending: NURSE PRACTITIONER
Payer: COMMERCIAL

## 2021-07-08 DIAGNOSIS — R29.90 STROKE-LIKE EPISODE: ICD-10-CM

## 2021-07-08 DIAGNOSIS — F41.9 ANXIETY DISORDER, UNSPECIFIED TYPE: ICD-10-CM

## 2021-07-08 DIAGNOSIS — R07.9 CHEST PAIN, UNSPECIFIED TYPE: ICD-10-CM

## 2021-07-08 DIAGNOSIS — R07.89 ATYPICAL CHEST PAIN: ICD-10-CM

## 2021-07-08 DIAGNOSIS — F41.9 ANXIETY: ICD-10-CM

## 2021-07-08 PROBLEM — R53.1 RIGHT SIDED WEAKNESS: Status: ACTIVE | Noted: 2021-07-08

## 2021-07-08 PROBLEM — R29.818 NEUROLOGICAL DEFICIT PRESENT: Status: ACTIVE | Noted: 2021-07-08

## 2021-07-08 LAB
ALBUMIN SERPL BCP-MCNC: 5.5 G/DL (ref 3.2–4.9)
ALBUMIN/GLOB SERPL: 2.1 G/DL
ALP SERPL-CCNC: 89 U/L (ref 30–99)
ALT SERPL-CCNC: 154 U/L (ref 2–50)
ANION GAP SERPL CALC-SCNC: 17 MMOL/L (ref 7–16)
AST SERPL-CCNC: 63 U/L (ref 12–45)
BASOPHILS # BLD AUTO: 0.5 % (ref 0–1.8)
BASOPHILS # BLD: 0.05 K/UL (ref 0–0.12)
BILIRUB SERPL-MCNC: 0.5 MG/DL (ref 0.1–1.5)
BUN SERPL-MCNC: 11 MG/DL (ref 8–22)
CALCIUM SERPL-MCNC: 10.1 MG/DL (ref 8.5–10.5)
CHLORIDE SERPL-SCNC: 100 MMOL/L (ref 96–112)
CO2 SERPL-SCNC: 23 MMOL/L (ref 20–33)
CREAT SERPL-MCNC: 1.16 MG/DL (ref 0.5–1.4)
EKG IMPRESSION: NORMAL
EOSINOPHIL # BLD AUTO: 0.01 K/UL (ref 0–0.51)
EOSINOPHIL NFR BLD: 0.1 % (ref 0–6.9)
ERYTHROCYTE [DISTWIDTH] IN BLOOD BY AUTOMATED COUNT: 43.8 FL (ref 35.9–50)
GLOBULIN SER CALC-MCNC: 2.6 G/DL (ref 1.9–3.5)
GLUCOSE SERPL-MCNC: 103 MG/DL (ref 65–99)
HCT VFR BLD AUTO: 47 % (ref 42–52)
HGB BLD-MCNC: 16.5 G/DL (ref 14–18)
IMM GRANULOCYTES # BLD AUTO: 0.03 K/UL (ref 0–0.11)
IMM GRANULOCYTES NFR BLD AUTO: 0.3 % (ref 0–0.9)
LIPASE SERPL-CCNC: 66 U/L (ref 11–82)
LV EJECT FRACT  99904: 60
LV EJECT FRACT MOD 2C 99903: 57.43
LV EJECT FRACT MOD 4C 99902: 60
LV EJECT FRACT MOD BP 99901: 54.93
LYMPHOCYTES # BLD AUTO: 2.32 K/UL (ref 1–4.8)
LYMPHOCYTES NFR BLD: 22.5 % (ref 22–41)
MCH RBC QN AUTO: 32.2 PG (ref 27–33)
MCHC RBC AUTO-ENTMCNC: 35.1 G/DL (ref 33.7–35.3)
MCV RBC AUTO: 91.6 FL (ref 81.4–97.8)
MONOCYTES # BLD AUTO: 0.82 K/UL (ref 0–0.85)
MONOCYTES NFR BLD AUTO: 8 % (ref 0–13.4)
NEUTROPHILS # BLD AUTO: 7.07 K/UL (ref 1.82–7.42)
NEUTROPHILS NFR BLD: 68.6 % (ref 44–72)
NRBC # BLD AUTO: 0 K/UL
NRBC BLD-RTO: 0 /100 WBC
PLATELET # BLD AUTO: 268 K/UL (ref 164–446)
PMV BLD AUTO: 11.5 FL (ref 9–12.9)
POTASSIUM SERPL-SCNC: 4 MMOL/L (ref 3.6–5.5)
PROT SERPL-MCNC: 8.1 G/DL (ref 6–8.2)
RBC # BLD AUTO: 5.13 M/UL (ref 4.7–6.1)
SODIUM SERPL-SCNC: 140 MMOL/L (ref 135–145)
TROPONIN T SERPL-MCNC: 9 NG/L (ref 6–19)
TROPONIN T SERPL-MCNC: <6 NG/L (ref 6–19)
WBC # BLD AUTO: 10.3 K/UL (ref 4.8–10.8)

## 2021-07-08 PROCEDURE — 93005 ELECTROCARDIOGRAM TRACING: CPT

## 2021-07-08 PROCEDURE — 96372 THER/PROPH/DIAG INJ SC/IM: CPT

## 2021-07-08 PROCEDURE — 70551 MRI BRAIN STEM W/O DYE: CPT

## 2021-07-08 PROCEDURE — G0378 HOSPITAL OBSERVATION PER HR: HCPCS

## 2021-07-08 PROCEDURE — 80053 COMPREHEN METABOLIC PANEL: CPT

## 2021-07-08 PROCEDURE — 99220 PR INITIAL OBSERVATION CARE,LEVL III: CPT | Performed by: INTERNAL MEDICINE

## 2021-07-08 PROCEDURE — 83690 ASSAY OF LIPASE: CPT

## 2021-07-08 PROCEDURE — 36415 COLL VENOUS BLD VENIPUNCTURE: CPT

## 2021-07-08 PROCEDURE — 96374 THER/PROPH/DIAG INJ IV PUSH: CPT

## 2021-07-08 PROCEDURE — 93306 TTE W/DOPPLER COMPLETE: CPT | Mod: 26 | Performed by: INTERNAL MEDICINE

## 2021-07-08 PROCEDURE — 700111 HCHG RX REV CODE 636 W/ 250 OVERRIDE (IP): Performed by: NURSE PRACTITIONER

## 2021-07-08 PROCEDURE — 93005 ELECTROCARDIOGRAM TRACING: CPT | Performed by: EMERGENCY MEDICINE

## 2021-07-08 PROCEDURE — 85025 COMPLETE CBC W/AUTO DIFF WBC: CPT

## 2021-07-08 PROCEDURE — 700111 HCHG RX REV CODE 636 W/ 250 OVERRIDE (IP): Performed by: EMERGENCY MEDICINE

## 2021-07-08 PROCEDURE — 84484 ASSAY OF TROPONIN QUANT: CPT

## 2021-07-08 PROCEDURE — 70450 CT HEAD/BRAIN W/O DYE: CPT

## 2021-07-08 PROCEDURE — A9270 NON-COVERED ITEM OR SERVICE: HCPCS | Performed by: INTERNAL MEDICINE

## 2021-07-08 PROCEDURE — 71045 X-RAY EXAM CHEST 1 VIEW: CPT

## 2021-07-08 PROCEDURE — 700102 HCHG RX REV CODE 250 W/ 637 OVERRIDE(OP): Performed by: INTERNAL MEDICINE

## 2021-07-08 PROCEDURE — 99285 EMERGENCY DEPT VISIT HI MDM: CPT

## 2021-07-08 PROCEDURE — 99204 OFFICE O/P NEW MOD 45 MIN: CPT | Performed by: PSYCHIATRY & NEUROLOGY

## 2021-07-08 PROCEDURE — 93306 TTE W/DOPPLER COMPLETE: CPT

## 2021-07-08 RX ORDER — ONDANSETRON 2 MG/ML
4 INJECTION INTRAMUSCULAR; INTRAVENOUS EVERY 4 HOURS PRN
Status: DISCONTINUED | OUTPATIENT
Start: 2021-07-08 | End: 2021-07-09 | Stop reason: HOSPADM

## 2021-07-08 RX ORDER — LOPERAMIDE HYDROCHLORIDE 2 MG/1
2 CAPSULE ORAL
Status: SHIPPED | COMMUNITY
End: 2021-07-21

## 2021-07-08 RX ORDER — TRAZODONE HYDROCHLORIDE 50 MG/1
50 TABLET ORAL NIGHTLY PRN
Status: DISCONTINUED | OUTPATIENT
Start: 2021-07-08 | End: 2021-07-09 | Stop reason: HOSPADM

## 2021-07-08 RX ORDER — IBUPROFEN 200 MG
600 TABLET ORAL EVERY 6 HOURS PRN
COMMUNITY
End: 2021-07-12

## 2021-07-08 RX ORDER — LORAZEPAM 2 MG/ML
1 INJECTION INTRAMUSCULAR ONCE
Status: COMPLETED | OUTPATIENT
Start: 2021-07-08 | End: 2021-07-08

## 2021-07-08 RX ORDER — PROCHLORPERAZINE EDISYLATE 5 MG/ML
5-10 INJECTION INTRAMUSCULAR; INTRAVENOUS EVERY 4 HOURS PRN
Status: DISCONTINUED | OUTPATIENT
Start: 2021-07-08 | End: 2021-07-09 | Stop reason: HOSPADM

## 2021-07-08 RX ORDER — PROMETHAZINE HYDROCHLORIDE 12.5 MG/1
12.5-25 SUPPOSITORY RECTAL EVERY 4 HOURS PRN
Status: DISCONTINUED | OUTPATIENT
Start: 2021-07-08 | End: 2021-07-09 | Stop reason: HOSPADM

## 2021-07-08 RX ORDER — PROMETHAZINE HYDROCHLORIDE 25 MG/1
12.5-25 TABLET ORAL EVERY 4 HOURS PRN
Status: DISCONTINUED | OUTPATIENT
Start: 2021-07-08 | End: 2021-07-09 | Stop reason: HOSPADM

## 2021-07-08 RX ORDER — AMOXICILLIN 250 MG
2 CAPSULE ORAL 2 TIMES DAILY
Status: DISCONTINUED | OUTPATIENT
Start: 2021-07-08 | End: 2021-07-09 | Stop reason: HOSPADM

## 2021-07-08 RX ORDER — ONDANSETRON 4 MG/1
4 TABLET, ORALLY DISINTEGRATING ORAL EVERY 4 HOURS PRN
Status: DISCONTINUED | OUTPATIENT
Start: 2021-07-08 | End: 2021-07-09 | Stop reason: HOSPADM

## 2021-07-08 RX ORDER — CHOLECALCIFEROL (VITAMIN D3) 125 MCG
1 CAPSULE ORAL
Status: SHIPPED | COMMUNITY
End: 2021-08-09

## 2021-07-08 RX ORDER — POLYETHYLENE GLYCOL 3350 17 G/17G
1 POWDER, FOR SOLUTION ORAL
Status: DISCONTINUED | OUTPATIENT
Start: 2021-07-08 | End: 2021-07-09 | Stop reason: HOSPADM

## 2021-07-08 RX ORDER — BISACODYL 10 MG
10 SUPPOSITORY, RECTAL RECTAL
Status: DISCONTINUED | OUTPATIENT
Start: 2021-07-08 | End: 2021-07-09 | Stop reason: HOSPADM

## 2021-07-08 RX ADMIN — LORAZEPAM 1 MG: 2 INJECTION INTRAMUSCULAR; INTRAVENOUS at 10:54

## 2021-07-08 RX ADMIN — ENOXAPARIN SODIUM 40 MG: 40 INJECTION SUBCUTANEOUS at 17:06

## 2021-07-08 RX ADMIN — TRAZODONE HYDROCHLORIDE 50 MG: 50 TABLET ORAL at 22:24

## 2021-07-08 ASSESSMENT — ENCOUNTER SYMPTOMS
DIARRHEA: 0
FOCAL WEAKNESS: 1
BLURRED VISION: 0
FEVER: 0
DIZZINESS: 0
COUGH: 0
SPEECH CHANGE: 1
ABDOMINAL PAIN: 0
NAUSEA: 0
SHORTNESS OF BREATH: 0
HEADACHES: 0
CONSTIPATION: 0
NERVOUS/ANXIOUS: 1
SORE THROAT: 0
CHILLS: 0
PALPITATIONS: 0
VOMITING: 0
TINGLING: 0
MYALGIAS: 0
DOUBLE VISION: 0
WEAKNESS: 0
TREMORS: 1
SENSORY CHANGE: 1

## 2021-07-08 ASSESSMENT — PAIN DESCRIPTION - PAIN TYPE: TYPE: ACUTE PAIN

## 2021-07-08 ASSESSMENT — PATIENT HEALTH QUESTIONNAIRE - PHQ9
1. LITTLE INTEREST OR PLEASURE IN DOING THINGS: NOT AT ALL
2. FEELING DOWN, DEPRESSED, IRRITABLE, OR HOPELESS: NOT AT ALL
SUM OF ALL RESPONSES TO PHQ9 QUESTIONS 1 AND 2: 0
1. LITTLE INTEREST OR PLEASURE IN DOING THINGS: NOT AT ALL
SUM OF ALL RESPONSES TO PHQ9 QUESTIONS 1 AND 2: 0
2. FEELING DOWN, DEPRESSED, IRRITABLE, OR HOPELESS: NOT AT ALL

## 2021-07-08 ASSESSMENT — LIFESTYLE VARIABLES
TOTAL SCORE: 0
HAVE PEOPLE ANNOYED YOU BY CRITICIZING YOUR DRINKING: NO
HAVE YOU EVER FELT YOU SHOULD CUT DOWN ON YOUR DRINKING: NO
TOTAL SCORE: 0
ALCOHOL_USE: YES
TOTAL SCORE: 0
DOES PATIENT WANT TO STOP DRINKING: NO
HOW MANY TIMES IN THE PAST YEAR HAVE YOU HAD 5 OR MORE DRINKS IN A DAY: 5
EVER HAD A DRINK FIRST THING IN THE MORNING TO STEADY YOUR NERVES TO GET RID OF A HANGOVER: NO
EVER FELT BAD OR GUILTY ABOUT YOUR DRINKING: NO
CONSUMPTION TOTAL: POSITIVE
ON A TYPICAL DAY WHEN YOU DRINK ALCOHOL HOW MANY DRINKS DO YOU HAVE: 4
AVERAGE NUMBER OF DAYS PER WEEK YOU HAVE A DRINK CONTAINING ALCOHOL: 2

## 2021-07-08 ASSESSMENT — FIBROSIS 4 INDEX
FIB4 SCORE: 0.66
FIB4 SCORE: 0.66

## 2021-07-08 NOTE — ED TRIAGE NOTES
..  Chief Complaint   Patient presents with   • Chest Pressure     states heavy pressure on chest constant   • Numbness     reports head to toe numbness radiating down arm     Pt states he was at work and was dealing with a conflict amongst coworkers and thinks he could of possibly have panic attack.       ..Explained triage process, to waiting room. Asked to inform RN if questions or concerns arise.

## 2021-07-08 NOTE — H&P
Hospital Medicine History & Physical Note    Date of Service  7/8/2021    Primary Care Physician  Luis Pradhan P.A.-C.    Consultants  neurology    Specialist Names: Dr. Mae    Code Status  Full Code    Chief Complaint  Chief Complaint   Patient presents with   • Chest Pressure     states heavy pressure on chest constant   • Numbness     reports head to toe numbness radiating down arm       History of Presenting Illness  Suresh Ho is a 36 y.o. male with no significant past medical history who presented 7/8/2021 with complaints of chest pain with associated bilateral upper extremity numbness.  According to the patient he was at work and was on a work-related call when he had sudden onset substernal chest pressure with radiating bilateral upper extremity numbness and tingling.  He also developed speech change with severe stuttering.  He reported his initial numbness and tingling resolved after 2 hours, although the chest pressure was persistent until he arrived in the ED.  Patient also reported sensation of palpitations during the episode.  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  Initial  cardiac work-up in the ED was negative with negative troponin and stable EKG.  While in the ED patient developed right sided numbness and weakness.  He is also noted to have mild tremor to right hand.  He also continues to have severe stuttering and difficulty finding words.  CT head was negative for acute intracranial abnormality.  Neurology was consulted and recommend further work-up with MRI.      I discussed the plan of care with patient and family.    Review of Systems  Review of Systems   Constitutional: Negative for chills, fever and malaise/fatigue.   HENT: Negative for congestion and sore throat.    Eyes: Negative for blurred vision and double vision.   Respiratory: Negative for cough and shortness of breath.    Cardiovascular: Positive for  "chest pain. Negative for palpitations and leg swelling.   Gastrointestinal: Negative for abdominal pain, constipation, diarrhea, nausea and vomiting.   Genitourinary: Negative for dysuria.   Musculoskeletal: Negative for joint pain and myalgias.   Skin: Negative for itching and rash.   Neurological: Positive for tremors (Right hand), sensory change (Right side numbness), speech change and focal weakness (Right side). Negative for dizziness, tingling, weakness and headaches.   Psychiatric/Behavioral: The patient is nervous/anxious.        Past Medical History   has no past medical history on file.    Surgical History   has a past surgical history that includes appendectomy (2010) and ventral hernia repair (9/12/2018).     Family History  family history includes Cancer in his maternal grandfather.   Family history reviewed with patient. There is no family history that is pertinent to the chief complaint.     Social History   reports that he quit smoking about 3 years ago. His smoking use included cigars. He smoked 0.00 packs per day. He uses smokeless tobacco. He reports current alcohol use. He reports that he does not use drugs.    Allergies  No Known Allergies    Medications  Prior to Admission Medications   Prescriptions Last Dose Informant Patient Reported? Taking?   ibuprofen (ADVIL) 200 MG Tab 7/6/2021 at PRN Patient Yes Yes   Sig: Take 600 mg by mouth every 6 hours as needed for Headache. 3 tablets = 600 mg.   lactase (LACTAID) 3000 UNIT Tab 7/6/2021 at PRN Significant Other Yes Yes   Sig: Take 1 tablet by mouth with meals as needed (Dairy).   loperamide (IMODIUM) 2 MG Cap \"Couple days ago\" at PRN Significant Other Yes Yes   Sig: Take 2 mg by mouth 1 time a day as needed for Diarrhea.      Facility-Administered Medications: None       Physical Exam  Temp:  [36.2 °C (97.2 °F)-36.7 °C (98 °F)] 36.2 °C (97.2 °F)  Pulse:  [68-90] 68  Resp:  [16-20] 17  BP: (121-145)/(69-98) 126/69  SpO2:  [94 %-99 %] 95 " %    Physical Exam  Vitals and nursing note reviewed.   Constitutional:       General: He is not in acute distress.     Appearance: Normal appearance. He is not ill-appearing.   HENT:      Head: Normocephalic and atraumatic.      Nose: Nose normal. No congestion.      Mouth/Throat:      Mouth: Mucous membranes are moist.      Pharynx: Oropharynx is clear. No oropharyngeal exudate.   Eyes:      General:         Right eye: No discharge.         Left eye: No discharge.      Conjunctiva/sclera: Conjunctivae normal.   Cardiovascular:      Rate and Rhythm: Normal rate and regular rhythm.      Pulses: Normal pulses.      Heart sounds: Normal heart sounds.   Pulmonary:      Effort: Pulmonary effort is normal. No respiratory distress.      Breath sounds: Normal breath sounds. No wheezing or rales.   Abdominal:      General: Bowel sounds are normal. There is no distension.      Palpations: Abdomen is soft.      Tenderness: There is no abdominal tenderness.   Musculoskeletal:      Cervical back: Normal range of motion and neck supple. No rigidity. No muscular tenderness.      Right lower leg: No edema.      Left lower leg: No edema.   Skin:     General: Skin is warm and dry.      Coloration: Skin is not jaundiced or pale.   Neurological:      Mental Status: He is alert and oriented to person, place, and time.      Comments: Right sided weakness and numbness  Right hand tremor  Speech change.   Psychiatric:         Mood and Affect: Mood is anxious.         Laboratory:  Recent Labs     07/08/21  0840   WBC 10.3   RBC 5.13   HEMOGLOBIN 16.5   HEMATOCRIT 47.0   MCV 91.6   MCH 32.2   MCHC 35.1   RDW 43.8   PLATELETCT 268   MPV 11.5     Recent Labs     07/08/21  0840   SODIUM 140   POTASSIUM 4.0   CHLORIDE 100   CO2 23   GLUCOSE 103*   BUN 11   CREATININE 1.16   CALCIUM 10.1     Recent Labs     07/08/21  0840   ALTSGPT 154*   ASTSGOT 63*   ALKPHOSPHAT 89   TBILIRUBIN 0.5   LIPASE 66   GLUCOSE 103*         No results for input(s):  NTPROBNP in the last 72 hours.      Recent Labs     07/08/21  0840   TROPONINT 9       Imaging:  CT-HEAD W/O   Final Result      No acute intracranial abnormality.      DX-CHEST-PORTABLE (1 VIEW)   Final Result      No acute cardiac or pulmonary abnormalities are identified.      MR-BRAIN-W/O    (Results Pending)   EC-ECHOCARDIOGRAM COMPLETE W/O CONT    (Results Pending)       X-Ray:  I have personally reviewed the images and compared with prior images.  EKG:  I have personally reviewed the images and compared with prior images.    Assessment/Plan:  I anticipate this patient is appropriate for observation status at this time.    Pain in the chest  Assessment & Plan  Substernal chest pain associated with bilateral upper extremity numbness and tingling.  Initial troponin negative.  Continue to trend.  Initial EKG negative for ST or T wave abnormalities.  Echo ordered.  Defer stress test at this time due to low risk of ACS.  Pain overall improved.    Neurological deficit present  Assessment & Plan  New onset right sided weakness and numbness and speech change.  Unknown etiology.  CT head negative for acute abnormality.  Pending MRI.  Neurology following.          VTE prophylaxis: enoxaparin ppx

## 2021-07-08 NOTE — ED PROVIDER NOTES
"ED Provider Note    CHIEF COMPLAINT  Chief Complaint   Patient presents with   • Chest Pressure     states heavy pressure on chest constant   • Numbness     reports head to toe numbness radiating down arm       HPI  Suresh Ho is a 36 y.o. male here for evaluation of chest pain.  Patient states that he had chest pain and some bilateral arm numbness, after getting a stressful phone call at work.  Patient states he went to work fine today, but the stress of the phone call and accompanied the workload, has caused him to have some anxiety.  Patient states he also has a 4-month-old daughter at home so he \"sometimes get some sleep and sometimes he does not.\"  He has no fever chills and no vomiting.  Patient states chest pain is left-sided, goes up to his left shoulder.  He has no associated diaphoresis or shortness shortness of breath.  Patient states that he feels better since he has been here in the ER, but when he starts talking about the stress at work, he starts feeling the same symptoms.  He denies any history of the same other than having some panic attacks when he was in his 20s.  The pt was having some trouble recalling words and 'getting them out.'  This lasted for a few minutes, but resolved.     ROS;  Please see HPI  O/W negative     PAST MEDICAL HISTORY   No bleeding disorders noted    SOCIAL HISTORY  Social History     Tobacco Use   • Smoking status: Former Smoker     Packs/day: 0.00     Types: Cigars     Quit date: 8/14/2017     Years since quitting: 3.9   • Smokeless tobacco: Current User   • Tobacco comment: once every six months; 9/5/2018 E Cig every 4-5 months   Vaping Use   • Vaping Use: Never used   Substance and Sexual Activity   • Alcohol use: Yes     Comment: once a month   • Drug use: No   • Sexual activity: Yes     Partners: Female     Comment: GF with infant daughter on the way       SURGICAL HISTORY   has a past surgical history that includes appendectomy (2010) and ventral hernia " "repair (9/12/2018).    CURRENT MEDICATIONS  Home Medications     Reviewed by Esthela Velazquez R.N. (Registered Nurse) on 07/08/21 at 0818  Med List Status: Not Addressed   Medication Last Dose Status   acetaminophen (TYLENOL) 500 MG Tab  Active   Multiple Vitamins-Minerals (MULTIVITAMIN ADULT PO)  Active   omeprazole (PRILOSEC) 20 MG delayed-release capsule  Active   triamcinolone acetonide (KENALOG) 0.5 % Cream  Active                ALLERGIES  No Known Allergies    REVIEW OF SYSTEMS  See HPI for further details. Review of systems as above, otherwise all other systems are negative.     PHYSICAL EXAM  VITAL SIGNS: /98   Pulse 90   Temp 36.7 °C (98 °F) (Temporal)   Resp 18   Ht 1.702 m (5' 7\")   Wt 77.1 kg (170 lb)   SpO2 99%   BMI 26.63 kg/m²     Constitutional: Well developed, well nourished. No acute distress.  HEENT: Normocephalic, atraumatic. MMM  Neck: Supple, Full range of motion   Chest/Pulmonary:  No respiratory distress.  Equal expansion   Abdomen; soft, nontender, no peritoneal signs, no guarding.  Musculoskeletal: No deformity, no edema, neurovascular intact.   Neuro: Clear, pressured speech, appropriate, cooperative, cranial nerves II-XII grossly intact.  Psych: Anxious mood and affect      Results for orders placed or performed during the hospital encounter of 07/08/21   CBC w/ Differential   Result Value Ref Range    WBC 10.3 4.8 - 10.8 K/uL    RBC 5.13 4.70 - 6.10 M/uL    Hemoglobin 16.5 14.0 - 18.0 g/dL    Hematocrit 47.0 42.0 - 52.0 %    MCV 91.6 81.4 - 97.8 fL    MCH 32.2 27.0 - 33.0 pg    MCHC 35.1 33.7 - 35.3 g/dL    RDW 43.8 35.9 - 50.0 fL    Platelet Count 268 164 - 446 K/uL    MPV 11.5 9.0 - 12.9 fL    Neutrophils-Polys 68.60 44.00 - 72.00 %    Lymphocytes 22.50 22.00 - 41.00 %    Monocytes 8.00 0.00 - 13.40 %    Eosinophils 0.10 0.00 - 6.90 %    Basophils 0.50 0.00 - 1.80 %    Immature Granulocytes 0.30 0.00 - 0.90 %    Nucleated RBC 0.00 /100 WBC    Neutrophils (Absolute) 7.07 " 1.82 - 7.42 K/uL    Lymphs (Absolute) 2.32 1.00 - 4.80 K/uL    Monos (Absolute) 0.82 0.00 - 0.85 K/uL    Eos (Absolute) 0.01 0.00 - 0.51 K/uL    Baso (Absolute) 0.05 0.00 - 0.12 K/uL    Immature Granulocytes (abs) 0.03 0.00 - 0.11 K/uL    NRBC (Absolute) 0.00 K/uL   Complete Metabolic Panel (CMP)   Result Value Ref Range    Sodium 140 135 - 145 mmol/L    Potassium 4.0 3.6 - 5.5 mmol/L    Chloride 100 96 - 112 mmol/L    Co2 23 20 - 33 mmol/L    Anion Gap 17.0 (H) 7.0 - 16.0    Glucose 103 (H) 65 - 99 mg/dL    Bun 11 8 - 22 mg/dL    Creatinine 1.16 0.50 - 1.40 mg/dL    Calcium 10.1 8.5 - 10.5 mg/dL    AST(SGOT) 63 (H) 12 - 45 U/L    ALT(SGPT) 154 (H) 2 - 50 U/L    Alkaline Phosphatase 89 30 - 99 U/L    Total Bilirubin 0.5 0.1 - 1.5 mg/dL    Albumin 5.5 (H) 3.2 - 4.9 g/dL    Total Protein 8.1 6.0 - 8.2 g/dL    Globulin 2.6 1.9 - 3.5 g/dL    A-G Ratio 2.1 g/dL   Troponin STAT   Result Value Ref Range    Troponin T 9 6 - 19 ng/L   Lipase   Result Value Ref Range    Lipase 66 11 - 82 U/L   ESTIMATED GFR   Result Value Ref Range    GFR If African American >60 >60 mL/min/1.73 m 2    GFR If Non African American >60 >60 mL/min/1.73 m 2   EKG (NOW)   Result Value Ref Range    Report       [facility]    Test Date:  2021  Pt Name:    CHIKIS RODRIGUEZ            Department: ER  MRN:        [id number]                      Room:  Gender:     Male                         Technician: [id number]  :        1985                   Requested By:ER TRIAGE PROTOCOL  Order #:    [id number]                    Reading MD:    Measurements  Intervals                                Axis  Rate:       79                           P:          40  UT:         140                          QRS:        49  QRSD:       78                           T:          19  QT:         368  QTc:        422    Interpretive Statements  SINUS RHYTHM  No previous ECG available for comparison       DX-CHEST-PORTABLE (1 VIEW)    Final Result      No acute cardiac or pulmonary abnormalities are identified.        Ekg;  nsr 79. No st elevation, no st depression, qtc 422. No ectopy.  No comparison       PROCEDURES     MEDICAL RECORD  I have reviewed patient's medical record and pertinent results are listed.    COURSE & MEDICAL DECISION MAKING  I have reviewed any medical record information, laboratory studies and radiographic results as noted above.    Heart score of 0    11:40 AM  The pt has a story c/w anxiety. He feels better after the ativan. His cp started after his episode, and returns when he talks about his work.  His cardiac work up is unremarkable, he has no risk factors, and so he will be discharged home. He is perc negative.     12:35 PM  Pts wife is here. She states the pt had some trouble speaking at 6 am this morning, and some 'all over  Numbness' while at work.   Pt had some right arm weakness as well, this morning, that resolved.     12:42 PM  I spoke to Dr. Mae.  He does not think it a stoke issue, but is ok with admit, mri and go from there.          FINAL IMPRESSION  1. Atypical chest pain     2. Anxiety     3.      Stroke like symptoms.         Electronically signed by: Rony Starks D.O., 7/8/2021 8:36 AM

## 2021-07-08 NOTE — ED NOTES
Med rec completed per Pt at bedside.  Allergies reviewed with Pt. No known drug allergies.  Pt denies taking any prescription medications.  No vitamins/supplements.  No oral antibiotics in last 30 days.  Pt's preferred pharmacy: [facility] in [place].   done

## 2021-07-08 NOTE — ED TRIAGE NOTES
"..  Chief Complaint   Patient presents with   • Chest Pressure     states heavy pressure on chest constant   • Numbness     reports head to toe numbness radiating down arm       Pt states he's also having intermittent shaking      ./98   Pulse 90   Temp 36.7 °C (98 °F) (Temporal)   Resp 18   Ht 1.702 m (5' 7\")   Wt 77.1 kg (170 lb)   SpO2 99%        Explained triage process, to waiting room. Asked to inform RN if questions or concerns arise.   "

## 2021-07-08 NOTE — CONSULTS
Neurology STROKE CODE H&P  Neurohospitalist Service, [facility]    Referring Physician: Rony Starks D.O.    STROKE CODE:   Chief Complaint   Patient presents with   • Chest Pressure     states heavy pressure on chest constant   • Numbness     reports head to toe numbness radiating down arm       To obtain the most accurate data regarding the time called, and time patient seen, refer to the stroke run-sheet and chart.  For time of CT, refer to the radiology report. See A&P below for TPA Decision and door to needle time if and when applicable.    HPI: 36-year-old male with no past medical history.  Says he woke up okay today.  With the work had a conversation with a colleague.  Developed palpitations and chest pressure.  Entire body numbness.  Came to the ER.  Work-up so far was unremarkable.  Was about to be discharged any complain about right-sided numbness and weakness.  ER physician did a CT head which is unremarkable.  Is also having stuttering speech.  Denies any recent stresses.  Onset of symptoms approximately around 6 AM today.  He is outside the window for any type of intervention.      Review of systems: In addition to what is detailed in the HPI above, (and scanned into the chart if and when applicable), all other systems reviewed and are negative.    Past Medical History:   Unremarkable  FHx:  family history includes Cancer in his maternal grandfather.    SHx:   reports that he quit smoking about 3 years ago. His smoking use included cigars. He smoked 0.00 packs per day. He uses smokeless tobacco. He reports current alcohol use. He reports that he does not use drugs.    Allergies:  No Known Allergies    Medications:  No current facility-administered medications for this encounter.    Current Outpatient Medications:   •  omeprazole (PRILOSEC) 20 MG delayed-release capsule, Take 1 Cap by mouth every day. Take 1/2 hour prior to same meal., Disp: 30 Cap, Rfl: 1  •  triamcinolone  acetonide (KENALOG) 0.5 % Cream, Apply 2-3 times daily., Disp: 60 g, Rfl: 0  •  Multiple Vitamins-Minerals (MULTIVITAMIN ADULT PO), Take  by mouth every day., Disp: , Rfl:   •  acetaminophen (TYLENOL) 500 MG Tab, Take 500-1,000 mg by mouth every 6 hours as needed., Disp: , Rfl:     Physical Examination:    Vitals:    07/08/21 0848 07/08/21 1100 07/08/21 1151 07/08/21 1300   BP:  121/73 126/80 (P) 141/87   Pulse: 82 75 76 (P) 68   Resp: 16 16 20 (P) 20   Temp:   36.2 °C (97.2 °F)    TempSrc:       SpO2: 97% 94% 97% (P) 95%   Weight:       Height:           General: Patient is awake and in no acute distress  Eyes: examination of optic disks unremarkable   CV: RRR    NEUROLOGICAL EXAM:     Mental status: Awake, alert and fully oriented, follows commands  Speech and language: Has a stuttering speech appears to be functional.  No signs of aphasia.    Cranial nerve exam: Pupils are equal, round and reactive to light bilaterally. Visual fields are full. Extraocular muscles are intact. Sensation in the face is intact to light touch. Face is symmetric. Hearing to finger rub equal. Palate elevates symmetrically. Shoulder shrug is full. Tongue is midline.  Motor exam: Poor effort on the right compared to left.  Inconsistent exam.  Appears to be normal at baseline.  Antigravity.  Positive Mccabe sign.  Says he cannot make a fist on the right however is able to grab my hand and I was able left up part of his upper body  using his hand strength to grab my hand.    Sensory exam: Subjective decrease in station on the right compared to left arm and leg    Deep tendon reflexes:  2+ and symmetric. Toes down-going bilaterally.  Coordination: no ataxia   Gait: deferred due to complaints of weakness    NIH Stroke Scale:    1a. Level of Consciousness (Alert, drowsy, etc): 0= Alert    1b. LOC Questions (Month, age): 0= Answers both correctly    1c. LOC Commands (Open/close eyes make fist/let go): 0= Obeys both correctly    2.   Best Gaze  (Eyes open - patient follows examiner's finger on face): 0= Normal    3.   Visual Fields (introduce visual stimulus/threat to patient's field quadrants): 0= No visual loss  4.   Facial Paresis (Show teeth, raise eyebrows and squeeze eyes shut): 0= Normal     5a. Motor Arm - Left (Elevate arm to 90 degrees if patient is sitting, 45 degrees if  supine): 0= No drift    5b. Motor Arm - Right (Elevate arm to 90 degrees if patient is sitting, 45 degrees if supine): 0= No drift    6a. Motor Leg - Left (Elevate leg 30 degrees with patient supine): 0= No drift    6b. Motor Leg - Right  (Elevate leg 30 degrees with patient supine): 1= Drift    7.   Limb Ataxia (Finger-nose, heel down shin): 0= No ataxia    8.   Sensory (Pin prick to face, arm, trunk and leg - compare side to side): 1= Partial loss    9.  Best Language (Name item, describe a picture and read sentences): 0= No aphasia    10. Dysarthria (Evaluate speech clarity by patient repeating listed words): 0= Normal articulation    11. Extinction and Inattention (Use information from prior testing to identify neglect or  double simultaneous stimuli testing): 0= No neglect    Total NIH Score: 2    Modified [place] Scale (MRS): 0 = No symptoms      Objective Data:    Labs:  Lab Results   Component Value Date/Time    PROTHROMBTM 11.7 (L) 05/18/2018 07:57 AM    INR 0.88 05/18/2018 07:57 AM      Lab Results   Component Value Date/Time    WBC 10.3 07/08/2021 08:40 AM    RBC 5.13 07/08/2021 08:40 AM    HEMOGLOBIN 16.5 07/08/2021 08:40 AM    HEMATOCRIT 47.0 07/08/2021 08:40 AM    MCV 91.6 07/08/2021 08:40 AM    MCH 32.2 07/08/2021 08:40 AM    MCHC 35.1 07/08/2021 08:40 AM    MPV 11.5 07/08/2021 08:40 AM    NEUTSPOLYS 68.60 07/08/2021 08:40 AM    LYMPHOCYTES 22.50 07/08/2021 08:40 AM    MONOCYTES 8.00 07/08/2021 08:40 AM    EOSINOPHILS 0.10 07/08/2021 08:40 AM    BASOPHILS 0.50 07/08/2021 08:40 AM      Lab Results   Component Value Date/Time    SODIUM 140 07/08/2021 08:40 AM     POTASSIUM 4.0 07/08/2021 08:40 AM    CHLORIDE 100 07/08/2021 08:40 AM    CO2 23 07/08/2021 08:40 AM    GLUCOSE 103 (H) 07/08/2021 08:40 AM    BUN 11 07/08/2021 08:40 AM    CREATININE 1.16 07/08/2021 08:40 AM      No results found for: CHOLSTRLTOT, LDL, HDL, TRIGLYCERIDE    Lab Results   Component Value Date/Time    ALKPHOSPHAT 89 07/08/2021 08:40 AM    ASTSGOT 63 (H) 07/08/2021 08:40 AM    ALTSGPT 154 (H) 07/08/2021 08:40 AM    TBILIRUBIN 0.5 07/08/2021 08:40 AM        Imaging/Testing:  CT-HEAD W/O   Final Result      No acute intracranial abnormality.      DX-CHEST-PORTABLE (1 VIEW)   Final Result      No acute cardiac or pulmonary abnormalities are identified.            Assessment and Plan:    36-year-old male presents with multiple complaints.  Palpitations.  Full body numbness now resolved.  Now complains of right-sided numbness and weakness.  And a stuttering speech.  The sltttering speech seems to be psychological.  He has poor effort and inconsistent exam for the right side weakness.  Positive Mccabe sign.  Agree with getting MRI brain.  If this is unremarkable may consider psychological consult.  Not sure what is the new stressor in his life.  He has no history of psychological disorders.  No history of depression.  Denies any suicidal ideation.    The evaluation of the patient, and recommended management, was discussed with the resident staff.     This chart was partially generated using voice recognition technology and sound alike word replacement may be present, best efforts were made to make the chart accurate.    Martinez Mae MD  Board Certified Neurology, ABPN  (t) [phone]

## 2021-07-08 NOTE — ASSESSMENT & PLAN NOTE
Substernal chest pain associated with bilateral upper extremity numbness and tingling.  Initial troponin negative.  Continue to trend.  Initial EKG negative for ST or T wave abnormalities.  Echo ordered.  Defer stress test at this time due to low risk of ACS.  Pain overall improved.

## 2021-07-08 NOTE — ASSESSMENT & PLAN NOTE
New onset right sided weakness and numbness and speech change.  Unknown etiology.  CT head negative for acute abnormality.  Pending MRI.  Neurology following.

## 2021-07-09 VITALS
RESPIRATION RATE: 16 BRPM | TEMPERATURE: 97.2 F | OXYGEN SATURATION: 97 % | WEIGHT: 169.97 LBS | DIASTOLIC BLOOD PRESSURE: 61 MMHG | HEART RATE: 48 BPM | SYSTOLIC BLOOD PRESSURE: 107 MMHG | BODY MASS INDEX: 29.02 KG/M2 | HEIGHT: 64 IN

## 2021-07-09 PROBLEM — F41.9 ANXIETY DISORDER: Status: ACTIVE | Noted: 2021-07-09

## 2021-07-09 LAB
ANION GAP SERPL CALC-SCNC: 15 MMOL/L (ref 7–16)
BUN SERPL-MCNC: 22 MG/DL (ref 8–22)
CALCIUM SERPL-MCNC: 9 MG/DL (ref 8.5–10.5)
CHLORIDE SERPL-SCNC: 106 MMOL/L (ref 96–112)
CO2 SERPL-SCNC: 22 MMOL/L (ref 20–33)
CREAT SERPL-MCNC: 1.09 MG/DL (ref 0.5–1.4)
ERYTHROCYTE [DISTWIDTH] IN BLOOD BY AUTOMATED COUNT: 46.1 FL (ref 35.9–50)
GLUCOSE SERPL-MCNC: 122 MG/DL (ref 65–99)
HCT VFR BLD AUTO: 46.5 % (ref 42–52)
HGB BLD-MCNC: 15.4 G/DL (ref 14–18)
MCH RBC QN AUTO: 31.6 PG (ref 27–33)
MCHC RBC AUTO-ENTMCNC: 33.1 G/DL (ref 33.7–35.3)
MCV RBC AUTO: 95.5 FL (ref 81.4–97.8)
PLATELET # BLD AUTO: 226 K/UL (ref 164–446)
PMV BLD AUTO: 11.9 FL (ref 9–12.9)
POTASSIUM SERPL-SCNC: 3.5 MMOL/L (ref 3.6–5.5)
RBC # BLD AUTO: 4.87 M/UL (ref 4.7–6.1)
SODIUM SERPL-SCNC: 143 MMOL/L (ref 135–145)
WBC # BLD AUTO: 5.8 K/UL (ref 4.8–10.8)

## 2021-07-09 PROCEDURE — 96372 THER/PROPH/DIAG INJ SC/IM: CPT

## 2021-07-09 PROCEDURE — 80048 BASIC METABOLIC PNL TOTAL CA: CPT

## 2021-07-09 PROCEDURE — 700102 HCHG RX REV CODE 250 W/ 637 OVERRIDE(OP): Performed by: NURSE PRACTITIONER

## 2021-07-09 PROCEDURE — 700111 HCHG RX REV CODE 636 W/ 250 OVERRIDE (IP): Performed by: NURSE PRACTITIONER

## 2021-07-09 PROCEDURE — 97165 OT EVAL LOW COMPLEX 30 MIN: CPT

## 2021-07-09 PROCEDURE — G0378 HOSPITAL OBSERVATION PER HR: HCPCS

## 2021-07-09 PROCEDURE — A9270 NON-COVERED ITEM OR SERVICE: HCPCS | Performed by: NURSE PRACTITIONER

## 2021-07-09 PROCEDURE — 99217 PR OBSERVATION CARE DISCHARGE: CPT | Performed by: NURSE PRACTITIONER

## 2021-07-09 PROCEDURE — 85027 COMPLETE CBC AUTOMATED: CPT

## 2021-07-09 RX ORDER — DIAZEPAM 2 MG/1
2 TABLET ORAL EVERY 6 HOURS PRN
Status: DISCONTINUED | OUTPATIENT
Start: 2021-07-09 | End: 2021-07-09 | Stop reason: HOSPADM

## 2021-07-09 RX ORDER — DIAZEPAM 2 MG/1
2 TABLET ORAL EVERY 6 HOURS PRN
Qty: 20 TABLET | Refills: 0 | Status: SHIPPED | OUTPATIENT
Start: 2021-07-09 | End: 2021-07-12

## 2021-07-09 RX ORDER — POTASSIUM CHLORIDE 20 MEQ/1
40 TABLET, EXTENDED RELEASE ORAL ONCE
Status: COMPLETED | OUTPATIENT
Start: 2021-07-09 | End: 2021-07-09

## 2021-07-09 RX ADMIN — POTASSIUM CHLORIDE 40 MEQ: 1500 TABLET, EXTENDED RELEASE ORAL at 11:34

## 2021-07-09 RX ADMIN — DIAZEPAM 2 MG: 2 TABLET ORAL at 12:14

## 2021-07-09 RX ADMIN — ENOXAPARIN SODIUM 40 MG: 40 INJECTION SUBCUTANEOUS at 05:29

## 2021-07-09 ASSESSMENT — ACTIVITIES OF DAILY LIVING (ADL): TOILETING: INDEPENDENT

## 2021-07-09 ASSESSMENT — COGNITIVE AND FUNCTIONAL STATUS - GENERAL
DAILY ACTIVITIY SCORE: 24
SUGGESTED CMS G CODE MODIFIER DAILY ACTIVITY: CH

## 2021-07-09 NOTE — DISCHARGE SUMMARY
Discharge Summary    CHIEF COMPLAINT ON ADMISSION  Chief Complaint   Patient presents with   • Chest Pressure     states heavy pressure on chest constant   • Numbness     reports head to toe numbness radiating down arm       Reason for Admission  Chest Pain, Arm Numbness     Admission Date  7/8/2021    CODE STATUS  Full Code    HPI & HOSPITAL COURSE  Suresh Ho is a 36 y.o. male with no significant past medical history who presented 7/8/2021 with complaints of chest pain with associated bilateral upper extremity numbness.  According to the patient he was at work and was on a work-related call when he had sudden onset substernal chest pressure with radiating bilateral upper extremity numbness and tingling.  He also developed speech change with severe stuttering.  He reported his initial numbness and tingling resolved after 2 hours, although the chest pressure was persistent until he arrived in the ED.  Patient also reported sensation of palpitations during the episode.  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  Initial cardiac work-up in the ED was negative with negative troponin and stable EKG.  While in the ED patient developed right sided numbness and weakness.  He is also noted to have mild tremor to right hand.  He also continues to have severe stuttering and difficulty finding words.  CT head was negative for acute intracranial abnormality.  Neurology was consulted and recommend further work-up with MRI which was negative for acute abnormalities.  Echocardiogram was negative for acute abnormalities.    The patient was monitored in the hospital overnight with overall improvement of right-sided weakness.  He continues to have stuttering speech.  At this time suspect functional disorder related to anxiety.  He was given a trial dose of Valium.  He will be given a 5-day supply Valium.  He will also be given outpatient psychiatry referral.  He  currently has no further need for acute intervention and is safe for discharge to home.         Therefore, he is discharged in good and stable condition to home with close outpatient follow-up.    Discharge Date  7/9/2021    FOLLOW UP ITEMS POST DISCHARGE  -Continue Valium as needed for anxiety.  -Follow psychiatry in the outpatient setting.    DISCHARGE DIAGNOSES  Active Problems:    Pain in the chest POA: Unknown    Neurological deficit present POA: Unknown    Anxiety disorder POA: Unknown  Resolved Problems:    * No resolved hospital problems. *      FOLLOW UP    Leticia Cruz  [address] [facility] [address]  [phone]            MEDICATIONS ON DISCHARGE     Medication List      START taking these medications      Instructions   diazePAM 2 MG Tabs  Commonly known as: VALIUM   Take 1 tablet by mouth every 6 hours as needed for Anxiety for up to 5 days.  Dose: 2 mg        CONTINUE taking these medications      Instructions   Advil 200 MG Tabs  Generic drug: ibuprofen   Take 600 mg by mouth every 6 hours as needed for Headache. 3 tablets = 600 mg.  Dose: 600 mg     Lactaid 3000 UNIT Tabs  Generic drug: lactase   Take 1 tablet by mouth with meals as needed (Dairy).  Dose: 1 tablet     loperamide 2 MG Caps  Commonly known as: IMODIUM   Take 2 mg by mouth 1 time a day as needed for Diarrhea.  Dose: 2 mg            Allergies  No Known Allergies    DIET  Orders Placed This Encounter   Procedures   • Diet Order Diet: Regular     Standing Status:   Standing     Number of Occurrences:   1     Order Specific Question:   Diet:     Answer:   Regular [1]       ACTIVITY  As tolerated.  Weight bearing as tolerated    CONSULTATIONS  Neurology    LABORATORY  Lab Results   Component Value Date    SODIUM 143 07/09/2021    POTASSIUM 3.5 (L) 07/09/2021    CHLORIDE 106 07/09/2021    CO2 22 07/09/2021    GLUCOSE 122 (H) 07/09/2021    BUN 22 07/09/2021    CREATININE 1.09 07/09/2021        Lab Results   Component Value Date    WBC 5.8  07/09/2021    HEMOGLOBIN 15.4 07/09/2021    HEMATOCRIT 46.5 07/09/2021    PLATELETCT 226 07/09/2021

## 2021-07-09 NOTE — DISCHARGE INSTRUCTIONS
Discharge Instructions    Discharged to home by car with relative. Discharged via wheelchair, hospital escort: Yes.  Special equipment needed: Not Applicable    Be sure to schedule a follow-up appointment with your primary care doctor or any specialists as instructed.     Discharge Plan:   Diet Plan: Discussed  Activity Level: Discussed  Confirmed Follow up Appointment: Appointment Scheduled  Confirmed Symptoms Management: Discussed  Medication Reconciliation Updated: Yes    I understand that a diet low in cholesterol, fat, and sodium is recommended for good health. Unless I have been given specific instructions below for another diet, I accept this instruction as my diet prescription.   Other diet: Heart healthy    Special Instructions: None    · Is patient discharged on Warfarin / Coumadin?   No     Depression / Suicide Risk    As you are discharged from this [facility] facility, it is important to learn how to keep safe from harming yourself.    Recognize the warning signs:  · Abrupt changes in personality, positive or negative- including increase in energy   · Giving away possessions  · Change in eating patterns- significant weight changes-  positive or negative  · Change in sleeping patterns- unable to sleep or sleeping all the time   · Unwillingness or inability to communicate  · Depression  · Unusual sadness, discouragement and loneliness  · Talk of wanting to die  · Neglect of personal appearance   · Rebelliousness- reckless behavior  · Withdrawal from people/activities they love  · Confusion- inability to concentrate     If you or a loved one observes any of these behaviors or has concerns about self-harm, here's what you can do:  · Talk about it- your feelings and reasons for harming yourself  · Remove any means that you might use to hurt yourself (examples: pills, rope, extension cords, firearm)  · Get professional help from the community (Mental Health, Substance Abuse, psychological counseling)  · Do  not be alone:Call your Safe Contact- someone whom you trust who will be there for you.  · Call your local CRISIS HOTLINE 941-9436 or 750-788-6457  · Call your local Children's Mobile Crisis Response Team [place] (836) 252-9714 or www.[employer]Ch[employer]  · Call the toll free National Suicide Prevention Hotlines   · National Suicide Prevention Lifeline 767-944-MJBJ (6401)  · National [employer] Line Network 800-SUICIDE (619-5104)

## 2021-07-09 NOTE — THERAPY
"Occupational Therapy   Initial Evaluation     Patient Name: Suresh Ho  Age:  36 y.o., Sex:  male  Medical Record #: [id number]  Today's Date: 7/9/2021          Assessment  Patient is 36 y.o. male admitted for chest pressure and numbness head to toe. PMHx: appendectomy and ventral hernia repair otherwise healthy and active. This admission pt is dx w/pain in chest and neurological deficit unknown etiology, pt was r/o for CVA and appears to present w/anxiety symptoms. Pt expressed high stress at work working 14+ per day, as well as having a new baby and reports less than 3 hours of sleep per night for the last several months. Pt could not identify coping strategies to address his current stress factors. Pt had R dominant UE tremors and limited ROM, however it was not impacting his functional participation in ADL's. Pt did express concern regarding immediate return to work, encouraged pt do discuss w/care team and PCP for return to work. Pt was also encourage to seek mental health supports in his community. No further acute OT needs indicated at this time.    Plan  Recommend Occupational Therapy for Evaluation only    DC Equipment Recommendations: None  Discharge Recommendations: Recommend outpatient occupational therapy services to address higher level deficits (Mental health/stress management resources )     Subjective  \"I haven't been sleeping\"      Objective     07/09/21 1020   Prior Living Situation   Prior Services None   Housing / Facility 1 Story House   Steps Into Home 0   Bathroom Set up Walk In Shower;Bathtub / Shower Combination   Equipment Owned None   Lives with - Patient's Self Care Capacity Spouse;Child Less than 18 Years of Age   Comments pt has 4th old new baby    Prior Level of ADL Function   Self Feeding Independent   Grooming / Hygiene Independent   Bathing Independent   Dressing Independent   Toileting Independent   Prior Level of IADL Function   Medication Management Independent "   Laundry Independent   Kitchen Mobility Independent   Finances Independent   Home Management Independent   Shopping Independent   Prior Level Of Mobility Independent Without Device in Community   Driving / Transportation Driving Independent   Occupation (Pre-Hospital Vocational) Employed Full Time   Pain 0 - 10 Group   Therapist Pain Assessment During Activity;Nurse Notified  (intermittent c/o R shoulder pain w/ROM )   Cognition    Cognition / Consciousness WDL   Level of Consciousness Alert   Comments notable stuttering w/speech    Passive ROM Upper Body   Passive ROM Upper Body X   Comments RUE limited proximally c/o pain    Active ROM Upper Body   Active ROM Upper Body  X   Comments RUE appeared tense/rigid    Strength Upper Body   Upper Body Strength  WDL   Coordination Upper Body   Coordination X   Comments tremor in RUE hand and inconsitent w/gross grasp    Balance Assessment   Sitting Balance (Static) Good   Sitting Balance (Dynamic) Good   Standing Balance (Static) Good   Standing Balance (Dynamic) Good   Weight Shift Sitting Good   Weight Shift Standing Good   Comments no ad    Bed Mobility    Supine to Sit Modified Independent   Sit to Supine Modified Independent   Scooting Modified Independent   Rolling Modified Independent   ADL Assessment   Grooming Modified Independent;Standing   Upper Body Dressing Modified Independent   Lower Body Dressing Modified Independent   Toileting Modified Independent   How much help from another person does the patient currently need...   6 Clicks Daily Activity Score 24   Functional Mobility   Sit to Stand Supervised   Bed, Chair, Wheelchair Transfer Supervised   Toilet Transfers Modified Independent   Mobility walking in room no AD no LOB    Visual Perception   Visual Perception  Not Tested   Edema / Skin Assessment   Edema / Skin  Not Assessed   Activity Tolerance   Comments c/o fatigue   Short Term Goals   Short Term Goal # 1 pt will have no further acute OT needs     Education Group   Role of Occupational Therapist Patient Response Patient;Acceptance;Explanation   Problem List   Problem List None   Anticipated Discharge Equipment and Recommendations   DC Equipment Recommendations None   Discharge Recommendations Recommend outpatient occupational therapy services to address higher level deficits  (Mental health/stress management resources )   Interdisciplinary Plan of Care Collaboration   IDT Collaboration with  Nursing   Patient Position at End of Therapy In Bed;Call Light within Reach;Tray Table within Reach;Phone within Reach   Collaboration Comments RN aware of OT eval and pts efforts    Session Information   Date / Session Number  7/9 #1 eval only    Priority 0

## 2021-07-09 NOTE — PROGRESS NOTES
Report received from LUCRECIA Beltran.  Patient brought up to the CDU from the Green Pod of the ED via wheelchair.  POC gone over with the patient and all questions answered.  Patient A & O  X 4.  No apparent signs of distress.  Safety precautions in place.  Patient educated to call for assistance.  Will continue to monitor.

## 2021-07-09 NOTE — PROGRESS NOTES
Brief neurology follow-up note    MRI brain was unremarkable.  Supporting my suspicion that this is a functional disorder underlying known psychological issues.  Could consider psychiatry consultation.  Especially if the patient still complaining of weakness and numbness and other issues.  No further work-up from neurology standpoint.  Neurology will sign off.

## 2021-07-09 NOTE — PROGRESS NOTES
Pt DC'd to home. IV removed, discharge instructions provided to patient, pt verbalizes understanding. Pt states all questions have been answered. Copy of discharge paperwork provided to pt, signed copy in chart. Pt states all belongings in possession. Pt escorted off unit by family members without incident.

## 2021-07-09 NOTE — CARE PLAN
Problem: Knowledge Deficit - Standard  Goal: Patient and family/care givers will demonstrate understanding of plan of care, disease process/condition, diagnostic tests and medications  Outcome: Progressing     Problem: Pain - Standard  Goal: Alleviation of pain or a reduction in pain to the patient’s comfort goal  Outcome: Progressing   The patient is Stable - Low risk of patient condition declining or worsening    Progress made toward(s) clinical / shift goals:  Pt's pain has decreased.    Patient is not progressing towards the following goals: N/A

## 2021-07-09 NOTE — PROGRESS NOTES
4 Eyes Skin Assessment Completed by LUCRECIA Maharaj and Suellen RN.    Head WDL  Ears WDL  Nose WDL  Mouth WDL  Neck WDL  Breast/Chest WDL  Shoulder Blades WDL  Spine WDL  (R) Arm/Elbow/Hand WDL  (L) Arm/Elbow/Hand WDL  Abdomen WDL  Groin WDL  Scrotum/Coccyx/Buttocks WDL  (R) Leg WDL  (L) Leg WDL  (R) Heel/Foot/Toe WDL  (L) Heel/Foot/Toe WDL          Devices In Places Pulse Ox      Interventions In Place Pillows    Possible Skin Injury No    Pictures Uploaded Into Epic N/A  Wound Consult Placed N/A  RN Wound Prevention Protocol Ordered No

## 2021-07-12 ENCOUNTER — OFFICE VISIT (OUTPATIENT)
Dept: MEDICAL GROUP | Facility: MEDICAL CENTER | Age: 36
End: 2021-07-12
Payer: COMMERCIAL

## 2021-07-12 VITALS
HEIGHT: 64 IN | WEIGHT: 174.38 LBS | BODY MASS INDEX: 29.77 KG/M2 | DIASTOLIC BLOOD PRESSURE: 82 MMHG | TEMPERATURE: 98 F | SYSTOLIC BLOOD PRESSURE: 118 MMHG | HEART RATE: 82 BPM | OXYGEN SATURATION: 100 %

## 2021-07-12 DIAGNOSIS — F98.5 ADULT ONSET STUTTERING: ICD-10-CM

## 2021-07-12 DIAGNOSIS — R29.818 NEUROLOGICAL DEFICIT PRESENT: ICD-10-CM

## 2021-07-12 DIAGNOSIS — R25.1 TREMOR: ICD-10-CM

## 2021-07-12 PROCEDURE — 99214 OFFICE O/P EST MOD 30 MIN: CPT | Performed by: PHYSICIAN ASSISTANT

## 2021-07-12 ASSESSMENT — PATIENT HEALTH QUESTIONNAIRE - PHQ9: CLINICAL INTERPRETATION OF PHQ2 SCORE: 0

## 2021-07-12 ASSESSMENT — FIBROSIS 4 INDEX: FIB4 SCORE: 0.79

## 2021-07-12 NOTE — PROGRESS NOTES
"Subjective:   Suresh Ho is a 36 y.o. male here today for neurological deficits present consisting of stuttering, headache and left-sided arm tremor and weakness.    Neurological deficit present  This is a pleasant 36-year-old male accompanied by his wife, Kesha.  On the eighth he was having chest pain with bilateral hand numbness.  Numbness down the right side of his body.  He was seen at the ER and hospitalized for less than a day.  Cardiac work-up was unremarkable.  Also had developed stuttering.  Stuttering is new onset.  Was thought of possibly him having a stroke.  CT and MRI of the brain were negative.  Neurology was advised.  They thought that his symptoms were related to anxiety.  He was provided Valium which causes him to have headaches.  He has a headache anyway of the occipital region.  He states is more of a pressure.  Since his discharge the chest pain has improved but he has significant weakness of the right arm as well as a tremor.  Worse with activity.  He denies any significant anxiety but did have significant stress related to work and other activities.  They have a 4-month-old.  He is trying to complete short-term disability from work at [employer].  Concerned about how these symptoms may affect him long-term.       Current medicines (including changes today)  Current Outpatient Medications   Medication Sig Dispense Refill   • lactase (LACTAID) 3000 UNIT Tab Take 1 tablet by mouth with meals as needed (Dairy).     • loperamide (IMODIUM) 2 MG Cap Take 2 mg by mouth 1 time a day as needed for Diarrhea.       No current facility-administered medications for this visit.     He  has no past medical history on file.    Social History and Family History were reviewed and updated.    ROS   No chest pain, no shortness of breath, no abdominal pain and all other systems were reviewed and are negative.       Objective:     /82   Pulse 82   Temp 36.7 °C (98 °F) (Temporal)   Ht 1.626 m (5' 4\")  "  Wt 79.1 kg (174 lb 6.1 oz)   SpO2 100%  Body mass index is 29.93 kg/m².   Physical Exam:  Constitutional: Alert, no distress.  Skin: Warm, dry, good turgor, no rashes in visible areas.  Eye: Equal, round and reactive, conjunctiva clear, lids normal.  ENMT: Lips without lesions, good dentition, oropharynx clear.  Neck: Trachea midline, no masses.   Lymph: No cervical or supraclavicular lymphadenopathy  Respiratory: Unlabored respiratory effort, lungs appear clear, no wheezes.  Cardiovascular: Regular rate rhythm.  Neuro: CN II through XII intact.  Upper body strength with his left side resting tremor and muscle strength at 2 out of 5.  Psych: Alert and oriented x3, normal affect and mood.        Assessment and Plan:   The following treatment plan was discussed    1. Neurological deficit present  Acute, new onset condition.  Advised to complete application for short-term disability.  If a form is needed he may provide it to me for completion.  Complicated case.  Reviewed imaging as well as blood work.  All appears to be in normal ranges.  I referred back to neurology for further investigation.  Hopefully will see a gradual improvement in his symptoms.  - REFERRAL TO NEUROLOGY    2. Adult onset stuttering  Acute, new onset condition.  Believed to be secondary to anxiety.  Refer to speech therapy for evaluation and treatment.  - REFERRAL TO NEUROLOGY  - REFERRAL TO SPEECH THERAPY    3. Tremor  Acute, new onset condition.  Appears to be secondary to some neurological deficit.  Refer to neurology for evaluation.  - REFERRAL TO NEUROLOGY         Followup: Return in about 4 weeks (around 8/9/2021), or if symptoms worsen or fail to improve.    Please note that this dictation was created using voice recognition software. I have made every reasonable attempt to correct obvious errors, but I expect that there are errors of grammar and possibly content that I did not discover before finalizing the note.

## 2021-07-12 NOTE — ASSESSMENT & PLAN NOTE
This is a pleasant 36-year-old male accompanied by his wife, Kesha.  On the eighth he was having chest pain with bilateral hand numbness.  Numbness down the right side of his body.  He was seen at the ER and hospitalized for less than a day.  Cardiac work-up was unremarkable.  Also had developed stuttering.  Stuttering is new onset.  Was thought of possibly him having a stroke.  CT and MRI of the brain were negative.  Neurology was advised.  They thought that his symptoms were related to anxiety.  He was provided Valium which causes him to have headaches.  He has a headache anyway of the occipital region.  He states is more of a pressure.  Since his discharge the chest pain has improved but he has significant weakness of the right arm as well as a tremor.  Worse with activity.  He denies any significant anxiety but did have significant stress related to work and other activities.  They have a 4-month-old.  He is trying to complete short-term disability from work at [employer].  Concerned about how these symptoms may affect him long-term.

## 2021-07-20 ENCOUNTER — SPEECH THERAPY (OUTPATIENT)
Dept: SPEECH THERAPY | Facility: REHABILITATION | Age: 36
End: 2021-07-20
Attending: PHYSICIAN ASSISTANT
Payer: COMMERCIAL

## 2021-07-20 DIAGNOSIS — F98.5 ADULT ONSET STUTTERING: ICD-10-CM

## 2021-07-20 PROCEDURE — 92522 EVALUATE SPEECH PRODUCTION: CPT

## 2021-07-20 PROCEDURE — 92521 EVALUATION OF SPEECH FLUENCY: CPT

## 2021-07-20 ASSESSMENT — ENCOUNTER SYMPTOMS: NO PATIENT REPORTED PAIN: 1

## 2021-07-20 NOTE — OP THERAPY EVALUATION
"  Outpatient Speech Therapy  INITIAL EVALUATION    [facility] Speech Therapy [place] [address]  [address]  [place] [address]  Phone:  [phone]  Fax:  [phone]    Date of Evaluation: 07/20/2021    Patient: Suresh Ho  YOB: 1985  MRN: [id number]     Referring Provider: Luis Pradhan P.A.-C.  [address]  [address]  [place] [address]   Referring Diagnosis Adult onset stuttering [F98.5]     Time Calculation    Start time: 0800  Stop time: 0845 Time Calculation (min): 45 minutes           Chief Complaint: Speech Problem (\"stutter\")    Visit Diagnoses     ICD-10-CM   1. Adult onset stuttering  F98.5     Subjective:   Reason for Therapy:     Reason For Evaluation:  Speech/Language    Onset Date:  7/8/2021    Onset Description:  Patient is a 36 year old male, referred to outpatient speech therapy in the setting of new onset of fluency disorder, specific to stuttering. Patient notes that changes to fluency began following a reported anxiety attack when arriving to work on 7/8/2021. Patient was evaluated by [facility] hospital and recommended to follow with Neurology.       Social Support:     Accompanied By:  Spouse (wife, Shelbie, present and supportive)    Patient Mental Status:  Alert and Responsive  Progress Factors:     Progression:  Staying the same  Pain:     no pain reported    Therapy History:     Current Diet:  Regular Diet, Normal Consistency and Thin Liquids    Nutritional Concerns Restrictions and Allergies:  No changes or concern to swallow noted or reported.     Hearing:  No Deficits    Vision:  No Deficits    Dentition:  Complete Dentition    Handedness:  Right-handed  Additional Subjective Comments:      Patient and wife note that fluency is highly variable at this time. Patient notes that fluency is worse in stressful or unknown situations. Due to changes in fluency, patient reports he is very overwhelmed in group or social settings where communication is " needed. Patient is currently on leave from work due to deficits in communication. Patient is a supervisor at [employer].     Patient is bilingual, speaking [place] as primary language. Dysfluencies were noted to be present in English and [place]anian. Constant tremors were noted to be present in right hand/ arm at the start of evaluation, but improved as session continued and were eliminated following reading of the Grandfather passage and patient reports of improvement in stress. Consistent with observations made during today's session, patient wife reports that tremors were not present in the car on the way to appointment but began once patient entered the therapy office. Patient does note increased weakness to right side as compared to left side.         No past medical history on file.  Past Surgical History:   Procedure Laterality Date   • VENTRAL HERNIA REPAIR  9/12/2018    Procedure: VENTRAL HERNIA REPAIR - W/MESH;  Surgeon: Katherine Brennan M.D.;  Location: SURGERY [facility];  Service: General   • APPENDECTOMY  2010     Objective:   Treatments/Interventions Performed:  Patient/Caregiver education  Other Treatment Interventions:  Assessment of oral motor coordination and function through administration of Frenchay Dysarthria Assessment 2/ Assessment of speech fluency through read aloud of the Grandfather passage  Objective Details:  Frenchay Dysarthria Assessment 2 (FDA 2)  Reflexes (a) normal. Respiration (a) normal.   Lips (a) normal: at rest, spread (smile), seal. (b) mild alternating, in speech as evidenced by breakdown in coordination of movement, likely impacted by presence of dysfluency.  Palate (a) normal.  Laryngeal (b) mild: time, pitch in speech. Sustained phonation /ah/ limited to 10 seconds, clear phonation with no pitch breaks demonstrated. Reduced volume of speech production demonstrated during sustained phonation.  Tongue (b) mild. Patient tongue with suggested slight right side deviation of  tongue in oral cavity at rest. Slight deviation of tongue tip to right side during protrusion with tongue orientation to slight ride side demonstrated during rapid alternating movements. Presence of dysfluencies limited assessment of tongue for Wilfredo.     Grandfather passage: reading completed in 4 minutes, 48 seconds (average read time 44 seconds).  Dysfluencies consistent with stuttering  Repetitions of sounds: 10/132 (8%)  Repetitions of syllables: 8/132 (6%)  Repetition of monosyllabic words: 42/132 (31.8%)    Prolongations: 12/132 (9%)  No blocks were noted    Other dysfluencies noted:  Multisyllabic word repetition: 22/132 (17%)  Whole word repetition: 18/132 (13.6%)  Phrase repetition: 13/132 (9.8%)    Speech Therapy Assessment:     Speech Mechanism Assessment:     Patient voice description: Clear    Patient's oral movements are voluntary and coordination: WFL    Patient speaks fluently: Severe    Patient uses adequate breath support: Yes  Speech mechanism comments: Increased stress demonstrated during attempts at phonation resulted in significant presence of dysfluencies. Dysfluencies present were noted to be consistent both with stuttering (repetitions of sounds, syllables, monosyllabic words) as well as typical dysfluency or atypical stuttering (repetitions of multi syllablic words, whole word and phrase repetitions).  Secondary behaviors observed with stuttering included present of physical tension with constant shaking of right hand/ arm; although this was noted to improve following reading of the Grandfather passage and as patient stress/ comfort levels improved. Presence of facial grimace also noted with increased frustration and patient and wife reports of avoidance of situations requiring conversation due to current level of dysfluencies in independent speech productions.     Speech Therapy Plan :   Prognosis & Recommendations  Impression Summary:  Mr. Suresh Ho, a 36 year old male,  participated in an outpatient speech therapy of oral motor, speech production and fluency in the setting of new onset of stuttering of unknown etiology.    Results of today's evaluation suggested that patient presented with dysfluencies characteristic of both stuttering behavior, in addition to atypical stuttering behaviors. Presence of secondary behaviors were noted throughout session, but appeared to calm and improve with reduced levels of stress or verbalization of feelings of improved comfort.     Current presence of dysfluencies to patient attempts at independent speech productions were noted to severely limit patient independence in communication, often requiring wife to assist in communication of the message the patient wished to convey throughout today's session. Patient verbalized and noted to be frustrated regarding changes to communication, which would severely limit his ability to communicate effectively with co-workers and management staff in an employment setting. As such, participation in direct, outpatient speech therapy is recommended at this time.   Prognosis:  Good  Prognosis Details:  Participation in direct, outpatient speech therapy addressing fluency of speech production is recommended to improve patient accuracy and independence in communication to reduce frustration and encourage independence in communication with a variety of listeners across a variety of conversational environments to support patient goal of improved fluency to return to work.   Goals  Short Term Goals:  1. Clinician will provide model in easy onset of speech production, light articulatory contacts to promote fluency in speech production improving from sound, syllable and single word levels.  2. Patient will implement fluency strategies including easy onset, light articulatory contacts to improve fluency of speech production from sound, syllable and single word level completing 6/10 trials with 75% or greater accuracy.    3. Patient will implement relaxation techniques to improve accuracy in speech production, as evidenced by no presence of tension or other secondary behaviors during structured speech production tasks 3/4 trials within 8 weeks.   Short Term Goal Duration (Weeks):  6-8 weeks  Long Term Goals:  Patient will improve fluency of speech production as measured by reading of the Grandfather passage within normal limits with 12 weeks.   Long Term Goal Duration (Weeks):  2-4 months  Patient Stated Goal: To correct my speech  Therapy Recommendations  Recommendation:  Other, Patient is scheduled for Neurology tomorrow, 7/21/2021 at 8:20am with Dr. aPvon.   Planned Therapy Interventions:  Home Program, Patient/Caregiver Education, Compensatory Strategy Training and Speech/Language training,   Plan Details:  16 units (02510) and then reassess for progress  Frequency:  2x week  Duration (in visits):  16  Duration (in weeks):  8      Functional Assessment Used  Frenchay Dysarthria Assessment 2  Grandfather passage    Referring provider co-signature:  I have reviewed this plan of care and my co-signature certifies the need for services.    Certification Period: 07/20/2021 to  09/15/21    Physician Signature: ________________________________ Date: ______________

## 2021-07-21 ENCOUNTER — OFFICE VISIT (OUTPATIENT)
Dept: NEUROLOGY | Facility: MEDICAL CENTER | Age: 36
End: 2021-07-21
Attending: PSYCHIATRY & NEUROLOGY
Payer: COMMERCIAL

## 2021-07-21 VITALS
TEMPERATURE: 97.8 F | DIASTOLIC BLOOD PRESSURE: 70 MMHG | SYSTOLIC BLOOD PRESSURE: 108 MMHG | WEIGHT: 178.79 LBS | BODY MASS INDEX: 29.79 KG/M2 | HEIGHT: 65 IN | OXYGEN SATURATION: 98 % | HEART RATE: 78 BPM

## 2021-07-21 DIAGNOSIS — R20.2 PARESTHESIAS: ICD-10-CM

## 2021-07-21 DIAGNOSIS — R25.1 TREMOR OF RIGHT HAND: ICD-10-CM

## 2021-07-21 DIAGNOSIS — G81.91 RIGHT HEMIPARESIS (HCC): ICD-10-CM

## 2021-07-21 PROCEDURE — 99211 OFF/OP EST MAY X REQ PHY/QHP: CPT | Performed by: PSYCHIATRY & NEUROLOGY

## 2021-07-21 PROCEDURE — 99215 OFFICE O/P EST HI 40 MIN: CPT | Performed by: PSYCHIATRY & NEUROLOGY

## 2021-07-21 PROCEDURE — 99417 PROLNG OP E/M EACH 15 MIN: CPT | Performed by: PSYCHIATRY & NEUROLOGY

## 2021-07-21 ASSESSMENT — ENCOUNTER SYMPTOMS
SENSORY CHANGE: 1
MEMORY LOSS: 0
TREMORS: 1
TINGLING: 1
HEADACHES: 0
SPEECH CHANGE: 1
FOCAL WEAKNESS: 1

## 2021-07-21 ASSESSMENT — FIBROSIS 4 INDEX: FIB4 SCORE: 0.79

## 2021-07-22 NOTE — PROGRESS NOTES
Subjective:      Suresh Ho is a 36 y.o. male who presents with his significant other, Shelbie, from the office of Luis Pradhan PA-C, for consultation, with a history of a sudden onset and persistent right-sided weakness, paresthesias and dysarthria/stuttering beginning on 7/8/2021.    JHONATAN Prince is a very pleasant 36-year-old right-handed gentleman who awoke in the morning on the day in question, in his usual state of health.  He went about his usual morning activities, drove to work, and remembers finishing a phone call while he had parked his car.  Suddenly there was a numbness that started that involved his entire body.  He also described chest pressure and the beginnings of some tremulousness with the right upper extremity.  He was confused, evidently his speech was distorted.  He was able to start his truck but got no further.    The following employee was right next-door, came to his aid.  He was driven to the emergency room.    I reviewed the electronic health record of the admission itself.  It was documented that he had a right-sided weakness complaining of stuttering and distortions of speech as well as right-sided paresthesias.  He was also complaining of occipital pressure.  With chest pressure being complained of, cardiac work-up was also pursued as was the possibility of stroke.  Neurology consultation was obtained.  The patient was beyond the window for any type of acute intervention from a neurovascular standpoint.  CT of the brain revealed no significant pathology.  The patient remained symptomatic, MRI of the brain was also normal.  Echocardiogram was normal as was the rest of his cardiac work-up.  Blood work revealed no evidence of significant renal or hepatic disease.  CBC was normal.    The time of discharge, it was felt that this was most likely a very severe anxiety reaction, but outpatient work-up was recommended by neurology as well as ER staff.  EEG study was never  done.    Since his discharge, he and Shelbie have noted that his symptoms consistently fluctuate and improved when he is in less stressful circumstances, when he is less self-conscious.  This is basically at home, in fact most of the symptoms completely attenuate.  They recur quite easily with only the slightest degree of stimulation.  Leading up to this, he had been under an immense amount of increasing stress.  They had just become first-time parents, but their daughter was born a month early, he had been working overtime traveling between [place] and the [facility].  He was maintaining several different accounts, but was getting no break from his employer.  They also just purchased a house and were having to go through renovations that were not all expected.    He does relate a history of some mild episodes where suddenly he could have some chest pressure and shortness of breath, he would begin to hyperventilate and began to notice some mild tingling over his entire body.  These events seem to occur spontaneously, but would last for about 10 minutes and resolve completely.  Initially they seem to occur multiple times for couple of years, but then stopped.    He has no medical history or surgical history of note.    Nobody in the family has a history of similar symptoms, his mother suffers from hypertension and dyslipidemia, his father as well, also from heart disease.  He has 7 brothers, all of whom are alive and well.  His daughter, Tessie, is in good health.    He rarely drinks alcohol, mostly social, though he can put down a few beers and whiskey cocktails.  He does not smoke cigarettes.  His work circumstances described as above.  He is on no medications.    Review of Systems   Neurological: Positive for tingling, tremors, sensory change, speech change and focal weakness. Negative for headaches.   Psychiatric/Behavioral: Negative for memory loss.   All other systems reviewed and are negative.        "Objective:     /70 (BP Location: Right arm)   Pulse 78   Temp 36.6 °C (97.8 °F) (Temporal)   Ht 1.651 m (5' 5\")   Wt 81.1 kg (178 lb 12.7 oz)   SpO2 98%   BMI 29.75 kg/m²      Physical Exam    He appears in no acute distress.  He is stuttering throughout the interview.  He still can communicate, his significant other helps.  Vital signs are stable.  There is no malar rash or jaw claudication.  The neck is supple.  Carotid pulses are present without asymmetry.  Cardiac evaluation reveals a regular rhythm.  There is no lower extremity edema.    Neurological Exam    He is fully oriented, there is no aphasia, apraxia, or inattention.  The stuttering pattern to his speech gives the appearance of language deficit, in fact this is not the case.    PERRLA/EOMI, visual fields are full to finger counting on confrontation bilaterally, there is no facial asymmetry, sensory exam is intact to temperature and pinprick bilaterally.  The tongue and uvula are midline.  Shoulder shrug and head rotation are intact and symmetric.  Disc margins are sharp with funduscopic exam.    Musculoskeletal exam reveals a tremor with the right upper extremity, there was poor relaxation.  When distracted as part of the exam, the tremulousness and tone improves remarkably.  There is no drift or asterixis.  Strength is intact throughout.  Reflexes are brisk and present throughout, there are no asymmetries, none are dropped, both toes are downgoing.    Though he stands slowly, he walks easily, tremor with the right hand improves, but the arm swing is diminished.  Station is normal, as is stride length.  Heel, toe, and tandem walking are all normal.  There is no appendicular dystaxia with any of the extremities.  Fine motor control is also normal with symmetric amplitude and frequencies throughout.    Sensory exam is intact to vibration, temperature and pinprick.     Assessment/Plan:     1. Right hemiparesis (HCC), tremor of right hand, " paresthesias  I agree with the initial assessments delivered by the emergency room staff as well as the neurology consultant.  Specifically, this is likely a very severe anxiety reaction, possibly part of a panic disorder that this gentleman suffers from.  There is nothing structurally ongoing with this gentleman that would explain the symptoms, this is not part of seizure or migraine.  Counseling certainly would be helpful and I think critical for him to be able to cope with these symptoms.  He was reassured that they will improve, there is no reason that he should not regain his normal status.  Was also told that these symptoms might recur moving forwards, and if he can learn to cope with them and control them from a psychological standpoint, he will always do well.    Face-to-face time was spent reviewing all the above.  Reassurances were provided to both of them.  He does not need neurologic long-term follow-up, he does need psychiatric care.  He had been provided Valium for his anxiety, but this simply made him sick to his stomach.  Whether or not he needs medication to use only as needed versus something taken every day can be determined by the appropriate specialist.    Time: 60 minutes spent face-to-face for exam, review, discussion, and education, of this over 50% of the time spent counseling and coordinating care.

## 2021-07-23 ENCOUNTER — PATIENT MESSAGE (OUTPATIENT)
Dept: MEDICAL GROUP | Facility: MEDICAL CENTER | Age: 36
End: 2021-07-23

## 2021-07-23 PROBLEM — F43.9 STRESS: Status: ACTIVE | Noted: 2021-07-23

## 2021-07-26 ENCOUNTER — APPOINTMENT (OUTPATIENT)
Dept: SPEECH THERAPY | Facility: REHABILITATION | Age: 36
End: 2021-07-26
Attending: PHYSICIAN ASSISTANT
Payer: COMMERCIAL

## 2021-07-28 ENCOUNTER — SPEECH THERAPY (OUTPATIENT)
Dept: SPEECH THERAPY | Facility: REHABILITATION | Age: 36
End: 2021-07-28
Attending: PHYSICIAN ASSISTANT
Payer: COMMERCIAL

## 2021-07-28 DIAGNOSIS — F98.5 ADULT ONSET STUTTERING: ICD-10-CM

## 2021-07-28 PROCEDURE — 92507 TX SP LANG VOICE COMM INDIV: CPT

## 2021-07-28 NOTE — OP THERAPY DAILY TREATMENT
"  Outpatient Speech Therapy  DAILY TREATMENT     [facility] [address]  [address]  [place] [address]  Phone:  [phone]  Fax:  [phone]    Date: 07/28/2021    Patient: Suresh Ho  YOB: 1985  MRN: [id number]     Time Calculation    Start time: 1100  Stop time: 1145 Time Calculation (min): 45 minutes         Chief Complaint: Other (stuttering)    Visit #: 2    Subjective:   Progress Factors:     Progression:  Getting Better  Additional Subjective Comments:      Patient returns reporting that both stress and excitement will change speech patterns, resulting in increased episodes of stuttering. Patient notes that speech fluency at home is improving.     Patient reports feeling a \"big relief\" following evaluation by Neurology. Patient endorses that with regards to speech, Neurology recommends patient calm and not take on so much moving forward.       Objective:   Treatments/Interventions Performed:  Patient/Caregiver education, Compensatory strategy training, Home program, Respiratory Coordination / Support training and Speech/Language treatment  Other Treatment Interventions:  Education and practice in easy onset to voicing, light articulatory contacts  Treatment Intervention tool(s) used:  SLP targeted fluency of speech production with progression to syllable level  Objective Details:  Easy onset targeted to sound production, progressed to words at CV, VC levels.     Easy onset in combination with light articulatory contacts targeted to CV, VC levels for bilabials: 18/20 = 90% accuracy, Velars: 11/12 = 91.6%, Alveolars: 17/19 = 89.4% accuracy. Direct cues faded to minimal verbal cues provided as necessary to promote fluency of speech production         Speech Therapy Assessment:     Speech Mechanism Assessment:   Speech mechanism comments: Verbal cues for breath support to promote coordination of breath-voice in combination with relaxed voice, speech " production proved beneficial, resulting in patient progression of voice and speech to CV, VC syllables levels with practice completed in light articulatory contacts to stop consonants and targeted sound classes of velars, bilabials and alveolar sounds.       Speech Therapy Plan :   Prognosis & Recommendations  Impression Summary:  Patient returned for his first follow up visit of direct, outpatient speech therapy addressing fluency of speech.    Patient demonstrated good response to initiation of fluency strategies including easy onset of voice in combination with light articulatory contacts. Patient demonstrated increased independence in carryover to independent speech productions to a 1-3 word, simple phrase level.   Therapy Recommendations  Recommendation:  Individual Speech Therapy,  Planned Therapy Interventions:  Home Program, Compensatory Strategy Training, Patient/Caregiver Education, Voice training, Respiratory coordination/support training and Speech/Language training,   Plan Details:  Continue with education and practice in easy onset, light articulatory contacts to words and phrases of increasing complexity

## 2021-08-02 ENCOUNTER — SPEECH THERAPY (OUTPATIENT)
Dept: SPEECH THERAPY | Facility: REHABILITATION | Age: 36
End: 2021-08-02
Attending: PHYSICIAN ASSISTANT
Payer: COMMERCIAL

## 2021-08-02 DIAGNOSIS — F98.5 ADULT ONSET STUTTERING: ICD-10-CM

## 2021-08-02 PROCEDURE — 92507 TX SP LANG VOICE COMM INDIV: CPT

## 2021-08-02 NOTE — OP THERAPY DAILY TREATMENT
"  Outpatient Speech Therapy  DAILY TREATMENT     [facility] [address].  [address]  [place] [address]  Phone:  [phone]  Fax:  [phone]    Date: 08/02/2021    Patient: Suresh Ho  YOB: 1985  MRN: [id number]     Time Calculation    Start time: 0906  Stop time: 0945 Time Calculation (min): 39 minutes         Chief Complaint: Other (stuttering \"much more relaxed and the wods are finding their ways out\")    Visit #: 3    Subjective:   Progress Factors:     Progression:  Getting Better  Additional Subjective Comments:      Patient endorses use of strategies specific to \"controlling his rate of speech\" patient endorses when he talks fast, the \"words don't find their way out.\"       Objective:   Treatments/Interventions Performed:  Patient/Caregiver education, Compensatory strategy training, Speech/Language treatment and Home program  Other Treatment Interventions:  Use of fluency strategies including: easy onset of speech production, light articulatory contacts  Objective Details:   Easy onset targeted to sound production, progressed to words at CV, VC levels.      Easy onset in combination with light articulatory contacts targeted to CV, VC levels for alveolars /t, d/ 15/15 = 100% accuracy. Alveolars CVC word: 15/19 = 78.9% accuracy. Bisyllabic words: 7/10 = 70%, self-corrected 1/3 attempts. Word medial: 9/11 = 81.8% accuracy, self corrected to 2/2 (100%). Word final: 13/16 = 81.25% accuracy, self corrected to 2/3 (66%)          Speech Therapy Assessment:     Speech Mechanism Assessment:     Patient voice description: Clear    Patient uses adequate breath support: Yes  Speech mechanism comments: Patient with consistent difficulty in completion of alveolar sounds such as /t, d, l, s, n/. Patient with noted benefit from light articulatory contacts.       Speech Therapy Plan :   Prognosis & Recommendations  Impression Summary: Patient seen for direct, " outpatient speech therapy addressing fluency of speech.    Patient returned reporting that current fluency strategies of easy onset and light articulatory contacts has been helpful in improving fluency of speech at this time. Patient endorses that the improvements he has made in speech have allowed him to return to socializing with friends and his community.    Despite progress, patient continues to present with deficits in fluency characterized by presence of sound prolongations, syllable repetitions. Continued participation in direct, outpatient speech therapy is recommended.   Therapy Recommendations  Recommendation:  Individual Speech Therapy,  Planned Therapy Interventions:  Home Program, Compensatory Strategy Training, Patient/Caregiver Education, Respiratory coordination/support training, Speech/Language training and Other, easy onset, light articulatory contacts

## 2021-08-04 ENCOUNTER — SPEECH THERAPY (OUTPATIENT)
Dept: SPEECH THERAPY | Facility: REHABILITATION | Age: 36
End: 2021-08-04
Attending: PHYSICIAN ASSISTANT
Payer: COMMERCIAL

## 2021-08-04 DIAGNOSIS — F98.5 ADULT ONSET STUTTERING: ICD-10-CM

## 2021-08-04 PROCEDURE — 92507 TX SP LANG VOICE COMM INDIV: CPT

## 2021-08-04 NOTE — OP THERAPY DAILY TREATMENT
"  Outpatient Speech Therapy  DAILY TREATMENT     [facility] [address].  [address]  [place] [address]  Phone:  [phone]  Fax:  [phone]    Date: 08/04/2021    Patient: Suresh Ho  YOB: 1985  MRN: [id number]     Time Calculation    Start time: 1105  Stop time: 1145 Time Calculation (min): 40 minutes         Chief Complaint: Other (fluency of speech)    Visit #: 4    Subjective:   Social Support:     Patient Mental Status:  Alert and Responsive  Progress Factors:     Progression:  Getting Better  Additional Subjective Comments:      Patient returns to outpatient speech therapy reporting feelings of overall improvement in fluency of speech production, reporting continued attempts at conversation with a variety of listeners, both family and friends. Patient endorses that a large part of his life is speech and communication. Patient reports previous participation in Psychiatry, reporting he is using strategies he learned to \"help myself reset.\"       Objective:   Treatments/Interventions Performed:  Patient/Caregiver education, Compensatory strategy training, Home program and Voice training  Treatment Intervention tool(s) used:  Use of fluency strategies including: easy onset of speech production, light articulatory contacts  Objective Details:  Practice in use of light articulatory contacts for sentence level productions, given structured read aloud tasks:    Words and phrases of increasing complexity for stop phonemes including /b/, /p/ and alveolar consonants /t/, /l/ during structured, read aloud tasks. /b/ completed with 32/42 = 76.2% accuracy, independent. Self corrections 3/10 (30%). /p/ 30/31 = 96.7% accuracy, independent. /t/ 19/23 = 82.6% accuracy, independent. /l/ 12/15 = 80% accuracy, independent.            Speech Therapy Assessment:     Speech Mechanism Assessment:     Patient speaks fluently: Minimal  Speech mechanism comments: Patient " demonstrated independence in completion of easy onset of speech production to independent level. Improved understanding and accuracy in light articulatory contacts resulted in improved fluency during structured read aloud tasks. Patient with noted difficulty in production of consonant clusters (such as /st/) with further direct intervention targeting recommended.       Speech Therapy Plan :   Prognosis & Recommendations  Impression Summary:  Patient seen for direct, outpatient speech therapy addressing fluency of speech.    Patient returned to speech therapy with improved rate and fluency of speech production with easy onset present to novel speech productions. Given this, outpatient speech therapy targeted fluency of speech production through continued development of light articulatory contacts with focus on phonemes of increased difficulty, including bilabial stops and alveolar sounds.     Despite progress, patient continues to present with dysfluencies to novel speech productions; although noted improvement demonstrated when compared to initial evaluation. Continued, direct intervention to address is recommended.    Therapy Recommendations  Recommendation:  Individual Speech Therapy,  Planned Therapy Interventions:  Home Program, Compensatory Strategy Training, Patient/Caregiver Education, Speech/Language training and Respiratory coordination/support training,   Plan Details:  Patient may benefit from further education in breath support for speech production. Continue to target words and phrases of increasing complexity, progressing to accuracy in fluency for novel speech productions as appropriate

## 2021-08-09 ENCOUNTER — OFFICE VISIT (OUTPATIENT)
Dept: MEDICAL GROUP | Facility: MEDICAL CENTER | Age: 36
End: 2021-08-09
Payer: COMMERCIAL

## 2021-08-09 ENCOUNTER — SPEECH THERAPY (OUTPATIENT)
Dept: SPEECH THERAPY | Facility: REHABILITATION | Age: 36
End: 2021-08-09
Attending: PHYSICIAN ASSISTANT
Payer: COMMERCIAL

## 2021-08-09 VITALS
SYSTOLIC BLOOD PRESSURE: 128 MMHG | HEART RATE: 69 BPM | BODY MASS INDEX: 29.64 KG/M2 | HEIGHT: 65 IN | DIASTOLIC BLOOD PRESSURE: 78 MMHG | TEMPERATURE: 99.3 F | OXYGEN SATURATION: 96 % | WEIGHT: 177.91 LBS

## 2021-08-09 DIAGNOSIS — F98.5 ADULT ONSET STUTTERING: ICD-10-CM

## 2021-08-09 DIAGNOSIS — F41.9 ANXIETY DISORDER, UNSPECIFIED TYPE: ICD-10-CM

## 2021-08-09 PROBLEM — R29.818 NEUROLOGICAL DEFICIT PRESENT: Status: RESOLVED | Noted: 2021-07-08 | Resolved: 2021-08-09

## 2021-08-09 PROBLEM — R07.9 PAIN IN THE CHEST: Status: RESOLVED | Noted: 2021-07-08 | Resolved: 2021-08-09

## 2021-08-09 PROCEDURE — 92507 TX SP LANG VOICE COMM INDIV: CPT

## 2021-08-09 PROCEDURE — 99213 OFFICE O/P EST LOW 20 MIN: CPT | Performed by: PHYSICIAN ASSISTANT

## 2021-08-09 ASSESSMENT — FIBROSIS 4 INDEX: FIB4 SCORE: 0.79

## 2021-08-09 NOTE — PROGRESS NOTES
"Subjective:   Suresh Ho is a 36 y.o. male here today for anxiety disorder triggering adult onset stuttering.    Anxiety disorder  This is a pleasant 36-year-old male who is doing much better with his speech. Stuttering intermittently. Speech therapy has been effective. His anxiety has been reduced. Stress has been improved obviously first and foremost because he is not working at [employer] at the moment. He is supposed to return on the 16th. He did not follow-up with psychiatry. Plans to make some changes in order to not keep his stresses internally. Saw neurology and there was no concern regarding his tremor. Neurologist also was in agreement that his symptoms were caused from anxiety. The tremor has improved. No more chest pain. Just mild stuttering and mild anxiousness.       Current medicines (including changes today)  No current outpatient medications on file.     No current facility-administered medications for this visit.     He  has no past medical history on file.    Social History and Family History were reviewed and updated.    ROS   No chest pain, no shortness of breath, no abdominal pain and all other systems were reviewed and are negative.       Objective:     /78   Pulse 69   Temp 37.4 °C (99.3 °F) (Temporal)   Ht 1.651 m (5' 5\")   Wt 80.7 kg (177 lb 14.6 oz)   SpO2 96%  Body mass index is 29.61 kg/m².   Physical Exam:  Constitutional: Alert, no distress.  Skin: Warm, dry, good turgor, no rashes in visible areas.  Eye: Equal, round and reactive, conjunctiva clear, lids normal.  ENMT: Lips without lesions, good dentition, oropharynx clear.  Neck: Trachea midline, no masses.   Lymph: No cervical or supraclavicular lymphadenopathy  Respiratory: Unlabored respiratory effort, lungs appear clear, no wheezes.  Cardiovascular: Regular rate rhythm.  Psych: Alert and oriented x3, normal affect and mood.        Assessment and Plan:   The following treatment plan was discussed    1. Anxiety " disorder, unspecified type  Acute, new onset condition. Likely chronic though. Stable. Much improved. Return to work next week. Advised if he needs more time off to go through [employer]. Contact me if he needs any forms completed. Also urged him to follow-up with me if he would like to see a therapist or psychologist.    2. Adult onset stuttering  Acute, new onset condition secondary to anxiety. Much improved. Continue to follow with speech. Mild symptoms noted today.         Followup: Return in about 3 months (around 11/9/2021), or if symptoms worsen or fail to improve.    Please note that this dictation was created using voice recognition software. I have made every reasonable attempt to correct obvious errors, but I expect that there are errors of grammar and possibly content that I did not discover before finalizing the note.

## 2021-08-09 NOTE — ASSESSMENT & PLAN NOTE
This is a pleasant 36-year-old male who is doing much better with his speech. Stuttering intermittently. Speech therapy has been effective. His anxiety has been reduced. Stress has been improved obviously first and foremost because he is not working at [employer] at the moment. He is supposed to return on the 16th. He did not follow-up with psychiatry. Plans to make some changes in order to not keep his stresses internally. Saw neurology and there was no concern regarding his tremor. Neurologist also was in agreement that his symptoms were caused from anxiety. The tremor has improved. No more chest pain. Just mild stuttering and mild anxiousness.

## 2021-08-11 ENCOUNTER — APPOINTMENT (OUTPATIENT)
Dept: SPEECH THERAPY | Facility: REHABILITATION | Age: 36
End: 2021-08-11
Attending: PHYSICIAN ASSISTANT
Payer: COMMERCIAL

## 2021-08-12 ENCOUNTER — SPEECH THERAPY (OUTPATIENT)
Dept: SPEECH THERAPY | Facility: REHABILITATION | Age: 36
End: 2021-08-12
Attending: PHYSICIAN ASSISTANT
Payer: COMMERCIAL

## 2021-08-12 DIAGNOSIS — F98.5 ADULT ONSET STUTTERING: ICD-10-CM

## 2021-08-12 PROCEDURE — 92507 TX SP LANG VOICE COMM INDIV: CPT

## 2021-08-12 NOTE — OP THERAPY DAILY TREATMENT
Outpatient Speech Therapy  DAILY TREATMENT     [facility] Speech [address]  [address]  [place] [address]  Phone:  [phone]  Fax:  [phone]    Date: 08/12/2021    Patient: Suresh Ho  YOB: 1985  MRN: [id number]     Time Calculation    Start time: 1345  Stop time: 1423 Time Calculation (min): 38 minutes         Chief Complaint: No chief complaint on file.    Visit #: 6    Subjective:   Additional Subjective Comments:      Patient endorses he has noticed a change in his speech over the last day, due to increased stressors and excitement regarding family visiting and return to work. Overall patient endorses that his speech production is much improved and no longer impacts his communication across conversational environments and speakers.       Objective:   Treatments/Interventions Performed:  Patient/Caregiver education, Home program, Speech/Language treatment and Compensatory strategy training  Treatment Intervention tool(s) used:  Targeted speech production addressing fluency and use of compensatory speech strategies  Objective Details:  Easy onset and light articulatory contacts present to independent level to support fluency     Novel speech production targeted during guided phrase level productions with fluency of speech maintained to 15/15 - 100% accuracy; independent. Novel speech production to conversational speech level: 90-95% accuracy; independent.          Speech Therapy Assessment:     Speech Mechanism Assessment:     Patient voice description: Clear    Patient speaks fluently: WFL    Patient exhibits conversational intelligibility: WFL  Speech mechanism comments: Patient demonstrated independence in use of compensatory speech strategies including use of relaxation (yawn-sigh), ensuring adequate breath support for speech production, easy onset to voicing and light articulatory contacts to an independent level to support fluency of speech  production.       Speech Therapy Plan :   Prognosis & Recommendations  Impression Summary: Mr. Suresh Ho, a 36 year old male, participated in 5 sessions of direct, outpatient speech therapy addressing fluency of speech in the setting of new onset of adult stuttering.     Patient demonstrated excellent progression in fluency of speech production as represented by a fluency rating of 90-95% at the novel, conversational speech level. Participation in reading of the Grandfather passage 8/9/2021 revealed fluency of speech within functional limits.      Patient noted that presence of stress continues to impact overall fluency of speech production; although patient is now independent in use of speech strategies to an independent level to support fluency. Given patient overall improvement, progress towards current goals and objectives, no further direct outpatient speech therapy addressing fluency is recommended at this time. Recommendations discussed with patient with patient agreeable to discharge at this time.        Patient noted to present with increased intensity of voicing, also increased rate of speech production. Use of compensatory speech strategies demonstrated to independent level when necessary.     Goals  Short Term Goals:  1. Clinician will provide model in easy onset of speech production, light articulatory contacts to promote fluency in speech production improving from sound, syllable and single word levels: GOAL MET 8/12/2021 to conversational speech level independent.   2. Patient will implement fluency strategies including easy onset, light articulatory contacts to improve fluency of speech production from sound, syllable and single word level completing 6/10 trials with 75% or greater accuracy: GOAL MET 8/12/2021. Fluency to conversational speech 90-95% independent.   3. Patient will implement relaxation techniques to improve accuracy in speech production, as evidenced by no presence of tension  or other secondary behaviors during structured speech production tasks 3/4 trials within 8 weeks: GOAL MET 8/12/2021.   Long Term Goals:  Patient will improve fluency of speech production as measured by reading of the Grandfather passage within normal limits with 12 weeks: GOAL MET 8/12/2021.   Therapy Recommendations  Recommendation:  Other and No further speech therapy indicated at this time, Consideration for behavioral health referral to provide further support regarding patient reports of increased stress  Plan Details:  D/C outpatient speech therapy.

## 2021-08-12 NOTE — OP THERAPY DISCHARGE SUMMARY
Outpatient Speech Language Pathology  DISCHARGE SUMMARY NOTE      [facility] Speech Language Pathology [address]  [address]  [place] [address]  Phone:  [phone]  Fax:  [phone]        Patient Name:  Suresh Ho  :  1985  MR#:  [id number]    HICN:       Visits:         Cancel/No-Show:     Diagnosis/ICD-10:     Referring Provider:     SOC Date:    Onset Date:       Your patient is being discharged from Physical therapy with the following comments:  Goals Met      Mr. Suresh Ho, a 36 year old male, participated in 5 sessions of direct, outpatient speech therapy addressing fluency of speech in the setting of new onset of adult stuttering.      Patient demonstrated excellent progression in fluency of speech production as represented by a fluency rating of 90-95% at the novel, conversational speech level. Participation in reading of the Grandfather passage 2021 revealed fluency of speech within functional limits.       Patient noted that presence of stress continues to impact overall fluency of speech production; although patient is now independent in use of speech strategies to an independent level to support fluency. Given patient overall improvement, progress towards current goals and objectives, no further direct outpatient speech therapy addressing fluency is recommended at this time. Recommendations discussed with patient with patient agreeable to discharge at this time.      Patient noted to present with increased intensity of voicing, also increased rate of speech production. Use of compensatory speech strategies demonstrated to independent level when necessary.      Goals  Short Term Goals:  1. Clinician will provide model in easy onset of speech production, light articulatory contacts to promote fluency in speech production improving from sound, syllable and single word levels: GOAL MET 2021 to conversational speech level independent.   2. Patient will  implement fluency strategies including easy onset, light articulatory contacts to improve fluency of speech production from sound, syllable and single word level completing 6/10 trials with 75% or greater accuracy: GOAL MET 8/12/2021. Fluency to conversational speech 90-95% independent.   3. Patient will implement relaxation techniques to improve accuracy in speech production, as evidenced by no presence of tension or other secondary behaviors during structured speech production tasks 3/4 trials within 8 weeks: GOAL MET 8/12/2021.   Long Term Goals:  Patient will improve fluency of speech production as measured by reading of the Grandfather passage within normal limits with 12 weeks: GOAL MET 8/12/2021.   Therapy Recommendations  Recommendation:  Other and No further speech therapy indicated at this time, Consideration for behavioral health referral to provide further support regarding management of patient reports of increased stress  Plan Details:  D/C outpatient speech therapy.                  Lilliana Schulte, SLP

## 2021-11-08 ENCOUNTER — APPOINTMENT (OUTPATIENT)
Dept: MEDICAL GROUP | Facility: MEDICAL CENTER | Age: 36
End: 2021-11-08
Payer: COMMERCIAL

## 2022-03-23 ENCOUNTER — OFFICE VISIT (OUTPATIENT)
Dept: MEDICAL GROUP | Facility: MEDICAL CENTER | Age: 37
End: 2022-03-23
Payer: COMMERCIAL

## 2022-03-23 VITALS
HEART RATE: 72 BPM | WEIGHT: 169 LBS | TEMPERATURE: 98.2 F | SYSTOLIC BLOOD PRESSURE: 108 MMHG | RESPIRATION RATE: 12 BRPM | OXYGEN SATURATION: 97 % | BODY MASS INDEX: 28.85 KG/M2 | HEIGHT: 64 IN | DIASTOLIC BLOOD PRESSURE: 66 MMHG

## 2022-03-23 DIAGNOSIS — Z00.00 PREVENTATIVE HEALTH CARE: ICD-10-CM

## 2022-03-23 DIAGNOSIS — F43.21 GRIEVING: ICD-10-CM

## 2022-03-23 DIAGNOSIS — F41.9 ANXIETY DISORDER, UNSPECIFIED TYPE: ICD-10-CM

## 2022-03-23 DIAGNOSIS — K59.00 CONSTIPATION, UNSPECIFIED CONSTIPATION TYPE: ICD-10-CM

## 2022-03-23 PROCEDURE — 99214 OFFICE O/P EST MOD 30 MIN: CPT | Performed by: PHYSICIAN ASSISTANT

## 2022-03-23 ASSESSMENT — FIBROSIS 4 INDEX: FIB4 SCORE: 0.79

## 2022-03-23 NOTE — ASSESSMENT & PLAN NOTE
This is a pleasant 36-year-old male accompanied by his wife, Shelbie, and toddler daughter.  He is here today to discuss anxiety.  Anxiety began last July.  He has a high stress job.  Developed chest pain and tremors with stuttering.  Started and has improved immensely.  Tremors have improved as well.  Does have intermittent chest pain.  He has been noticing more anxiety at work.  Unable to deal with the workflow when he has to speak.  He has notified his supervisor who is aware of his condition.  He would like to be on medication.  We talked medication the past but he wanted to try other options such as CBD which is effective but still dealing with anxiety.  Also complains of significant fatigue.  Wife has noticed the fatigue as well.  Not as active as he was with house work and the like.  Wants to stay in bed.  They believe as well that this is part of his depression.  On Friday he also lost a good friend.  Cause was thought to be cardiac.  He is unsure.  Has been grieving since Friday.  Does have counseling through his employer.

## 2022-03-23 NOTE — PROGRESS NOTES
Subjective:   Suresh Ho is a 36 y.o. male here today for anxiety and constipation.    Anxiety disorder  This is a pleasant 36-year-old male accompanied by his wife, Shelbie, and toddler daughter.  He is here today to discuss anxiety.  Anxiety began last July.  He has a high stress job.  Developed chest pain and tremors with stuttering.  Started and has improved immensely.  Tremors have improved as well.  Does have intermittent chest pain.  He has been noticing more anxiety at work.  Unable to deal with the workflow when he has to speak.  He has notified his supervisor who is aware of his condition.  He would like to be on medication.  We talked medication the past but he wanted to try other options such as CBD which is effective but still dealing with anxiety.  Also complains of significant fatigue.  Wife has noticed the fatigue as well.  Not as active as he was with house work and the like.  Wants to stay in bed.  They believe as well that this is part of his depression.  On Friday he also lost a good friend.  Cause was thought to be cardiac.  He is unsure.  Has been grieving since Friday.  Does have counseling through his employer.    Constipation  Chronic condition with intermittent rectal bleeding.  Typically when not eating appropriately.       Current medicines (including changes today)  Current Outpatient Medications   Medication Sig Dispense Refill   • sertraline (ZOLOFT) 50 MG Tab Take 1 Tablet by mouth every day. 1/2 tablet first 5 days. 30 Tablet 3     No current facility-administered medications for this visit.     He  has no past medical history on file.    Social History and Family History were reviewed and updated.    ROS   No chest pain, no shortness of breath, no abdominal pain and all other systems were reviewed and are negative.       Objective:     /66 (BP Location: Right arm, Patient Position: Sitting, BP Cuff Size: Adult)   Pulse 72   Temp 36.8 °C (98.2 °F)   Resp 12   Ht  "1.626 m (5' 4\")   Wt 76.7 kg (169 lb)   SpO2 97%  Body mass index is 29.01 kg/m².   Physical Exam:  Constitutional: Alert, no distress.  Skin: Warm, dry, good turgor, no rashes in visible areas.  Eye: Equal, round and reactive, conjunctiva clear, lids normal.  ENMT: Lips without lesions, good dentition, oropharynx clear.  Neck: Trachea midline, no masses.   Lymph: No cervical or supraclavicular lymphadenopathy  Respiratory: Unlabored respiratory effort, lungs appear clear, no wheezes.  Cardiovascular: Regular rate and rhythm.  Psych: Alert and oriented x3, normal affect and mood.        Assessment and Plan:   The following treatment plan was discussed    1. Anxiety disorder, unspecified type  Chronic condition.  Prescribe Zoloft as directed.  Discussed side effects.  Contact me through [employer] if there are any concerns.  Advised to either send over to an email attachment FMLA or bring in the document.  I will gladly fill out FMLA without seeing him.  His history is well-known to me.  - sertraline (ZOLOFT) 50 MG Tab; Take 1 Tablet by mouth every day. 1/2 tablet first 5 days.  Dispense: 30 Tablet; Refill: 3    2. Grieving  Acute, new onset condition.  Secondary to death of friend.  We will continue to monitor.  Follow-up with online counseling through his employer.    3. Preventative health care  Order labs.  Must fast 8 hours.  - TSH WITH REFLEX TO FT4; Future  - CBC WITH DIFFERENTIAL; Future  - Comp Metabolic Panel; Future  - HEMOGLOBIN A1C; Future  - Lipid Profile; Future    4. Constipation, unspecified constipation type  Chronic condition.  Increase dietary fiber.  We will continue to monitor.  If there are any abnormalities with his CBC will likely refer to GI.         Followup: Return in about 3 months (around 6/23/2022), or if symptoms worsen or fail to improve.    Please note that this dictation was created using voice recognition software. I have made every reasonable attempt to correct obvious errors, but " I expect that there are errors of grammar and possibly content that I did not discover before finalizing the note.

## 2022-04-07 ENCOUNTER — PATIENT MESSAGE (OUTPATIENT)
Dept: MEDICAL GROUP | Facility: MEDICAL CENTER | Age: 37
End: 2022-04-07
Payer: COMMERCIAL

## 2022-04-08 NOTE — TELEPHONE ENCOUNTER
From: Suresh Ho  To: Physician Assistant Luis Pradhan  Sent: 4/7/2022 4:19 PM PDT  Subject: Disability/Leave paperwork    Marco Smith - I have attached documents for my disability/leave, I also would like you to know that the prescription has been working out just fine for me.

## 2022-04-11 ENCOUNTER — HOSPITAL ENCOUNTER (OUTPATIENT)
Dept: LAB | Facility: MEDICAL CENTER | Age: 37
End: 2022-04-11
Attending: PHYSICIAN ASSISTANT
Payer: COMMERCIAL

## 2022-04-11 DIAGNOSIS — Z00.00 PREVENTATIVE HEALTH CARE: ICD-10-CM

## 2022-04-11 LAB
BASOPHILS # BLD AUTO: 0.5 % (ref 0–1.8)
BASOPHILS # BLD: 0.03 K/UL (ref 0–0.12)
CHOLEST SERPL-MCNC: 186 MG/DL (ref 100–199)
EOSINOPHIL # BLD AUTO: 0.08 K/UL (ref 0–0.51)
EOSINOPHIL NFR BLD: 1.3 % (ref 0–6.9)
ERYTHROCYTE [DISTWIDTH] IN BLOOD BY AUTOMATED COUNT: 45.6 FL (ref 35.9–50)
EST. AVERAGE GLUCOSE BLD GHB EST-MCNC: 111 MG/DL
FASTING STATUS PATIENT QL REPORTED: NORMAL
GFR SERPLBLD CREATININE-BSD FMLA CKD-EPI: 104 ML/MIN/1.73 M 2
HBA1C MFR BLD: 5.5 % (ref 4–5.6)
HCT VFR BLD AUTO: 50.1 % (ref 42–52)
HDLC SERPL-MCNC: 32 MG/DL
HGB BLD-MCNC: 16.3 G/DL (ref 14–18)
IMM GRANULOCYTES # BLD AUTO: 0.01 K/UL (ref 0–0.11)
IMM GRANULOCYTES NFR BLD AUTO: 0.2 % (ref 0–0.9)
LDLC SERPL CALC-MCNC: 97 MG/DL
LYMPHOCYTES # BLD AUTO: 2.58 K/UL (ref 1–4.8)
LYMPHOCYTES NFR BLD: 42.2 % (ref 22–41)
MCH RBC QN AUTO: 31.3 PG (ref 27–33)
MCHC RBC AUTO-ENTMCNC: 32.5 G/DL (ref 33.7–35.3)
MCV RBC AUTO: 96.2 FL (ref 81.4–97.8)
MONOCYTES # BLD AUTO: 0.55 K/UL (ref 0–0.85)
MONOCYTES NFR BLD AUTO: 9 % (ref 0–13.4)
NEUTROPHILS # BLD AUTO: 2.86 K/UL (ref 1.82–7.42)
NEUTROPHILS NFR BLD: 46.8 % (ref 44–72)
NRBC # BLD AUTO: 0 K/UL
NRBC BLD-RTO: 0 /100 WBC
PLATELET # BLD AUTO: 224 K/UL (ref 164–446)
PMV BLD AUTO: 12.3 FL (ref 9–12.9)
RBC # BLD AUTO: 5.21 M/UL (ref 4.7–6.1)
TRIGL SERPL-MCNC: 285 MG/DL (ref 0–149)
WBC # BLD AUTO: 6.1 K/UL (ref 4.8–10.8)

## 2022-04-11 PROCEDURE — 84443 ASSAY THYROID STIM HORMONE: CPT

## 2022-04-11 PROCEDURE — 80053 COMPREHEN METABOLIC PANEL: CPT

## 2022-04-11 PROCEDURE — 83036 HEMOGLOBIN GLYCOSYLATED A1C: CPT

## 2022-04-11 PROCEDURE — 36415 COLL VENOUS BLD VENIPUNCTURE: CPT

## 2022-04-11 PROCEDURE — 85025 COMPLETE CBC W/AUTO DIFF WBC: CPT

## 2022-04-11 PROCEDURE — 80061 LIPID PANEL: CPT

## 2022-04-12 ENCOUNTER — TELEPHONE (OUTPATIENT)
Dept: MEDICAL GROUP | Facility: MEDICAL CENTER | Age: 37
End: 2022-04-12
Payer: COMMERCIAL

## 2022-04-12 DIAGNOSIS — E87.0 HYPERNATREMIA: ICD-10-CM

## 2022-04-12 LAB
ALBUMIN SERPL BCP-MCNC: 4.8 G/DL (ref 3.2–4.9)
ALBUMIN/GLOB SERPL: 1.8 G/DL
ALP SERPL-CCNC: 86 U/L (ref 30–99)
ALT SERPL-CCNC: 56 U/L (ref 2–50)
ANION GAP SERPL CALC-SCNC: 22 MMOL/L (ref 7–16)
AST SERPL-CCNC: 34 U/L (ref 12–45)
BILIRUB SERPL-MCNC: 0.3 MG/DL (ref 0.1–1.5)
BUN SERPL-MCNC: 19 MG/DL (ref 8–22)
CALCIUM SERPL-MCNC: 9.4 MG/DL (ref 8.5–10.5)
CHLORIDE SERPL-SCNC: 104 MMOL/L (ref 96–112)
CO2 SERPL-SCNC: 24 MMOL/L (ref 20–33)
CREAT SERPL-MCNC: 0.97 MG/DL (ref 0.5–1.4)
GLOBULIN SER CALC-MCNC: 2.6 G/DL (ref 1.9–3.5)
GLUCOSE SERPL-MCNC: 96 MG/DL (ref 65–99)
POTASSIUM SERPL-SCNC: 4.8 MMOL/L (ref 3.6–5.5)
PROT SERPL-MCNC: 7.4 G/DL (ref 6–8.2)
SODIUM SERPL-SCNC: 150 MMOL/L (ref 135–145)
TSH SERPL DL<=0.005 MIU/L-ACNC: 3.46 UIU/ML (ref 0.38–5.33)

## 2022-04-12 NOTE — TELEPHONE ENCOUNTER
----- Message from Luis Pradhan P.A.-C. sent at 4/12/2022 12:43 PM PDT -----  Please contact Suresh.  Labs are decent.  Only concern was a elevation of sodium.  This is not common.  Would like him to repeat this lab in about 2 weeks.  Nonfasting this time.  Thank you.    Luis

## 2022-04-12 NOTE — TELEPHONE ENCOUNTER
Phone Number Called: [phone]    Call outcome: Spoke to patient regarding message below.    Message: Pt was notified of lab results and had no questions or concerns. Pt stated that he will do repeat labs in around 2 weeks nonfasting.

## 2022-05-01 ENCOUNTER — APPOINTMENT (OUTPATIENT)
Dept: RADIOLOGY | Facility: MEDICAL CENTER | Age: 37
End: 2022-05-01
Attending: EMERGENCY MEDICINE
Payer: COMMERCIAL

## 2022-05-01 ENCOUNTER — HOSPITAL ENCOUNTER (EMERGENCY)
Facility: MEDICAL CENTER | Age: 37
End: 2022-05-01
Attending: EMERGENCY MEDICINE
Payer: COMMERCIAL

## 2022-05-01 VITALS
DIASTOLIC BLOOD PRESSURE: 81 MMHG | SYSTOLIC BLOOD PRESSURE: 118 MMHG | HEIGHT: 64 IN | RESPIRATION RATE: 16 BRPM | BODY MASS INDEX: 29.09 KG/M2 | HEART RATE: 80 BPM | OXYGEN SATURATION: 99 % | WEIGHT: 170.42 LBS | TEMPERATURE: 98 F

## 2022-05-01 DIAGNOSIS — R07.9 CHEST PAIN, UNSPECIFIED TYPE: ICD-10-CM

## 2022-05-01 DIAGNOSIS — K29.20 ACUTE ALCOHOLIC GASTRITIS WITHOUT HEMORRHAGE: ICD-10-CM

## 2022-05-01 LAB
ALBUMIN SERPL BCP-MCNC: 5.4 G/DL (ref 3.2–4.9)
ALBUMIN/GLOB SERPL: 2 G/DL
ALP SERPL-CCNC: 92 U/L (ref 30–99)
ALT SERPL-CCNC: 74 U/L (ref 2–50)
ANION GAP SERPL CALC-SCNC: 18 MMOL/L (ref 7–16)
AST SERPL-CCNC: 30 U/L (ref 12–45)
BASOPHILS # BLD AUTO: 0.4 % (ref 0–1.8)
BASOPHILS # BLD: 0.03 K/UL (ref 0–0.12)
BILIRUB SERPL-MCNC: 0.3 MG/DL (ref 0.1–1.5)
BUN SERPL-MCNC: 9 MG/DL (ref 8–22)
CALCIUM SERPL-MCNC: 10 MG/DL (ref 8.5–10.5)
CHLORIDE SERPL-SCNC: 102 MMOL/L (ref 96–112)
CO2 SERPL-SCNC: 20 MMOL/L (ref 20–33)
CREAT SERPL-MCNC: 0.99 MG/DL (ref 0.5–1.4)
EKG IMPRESSION: NORMAL
EOSINOPHIL # BLD AUTO: 0.01 K/UL (ref 0–0.51)
EOSINOPHIL NFR BLD: 0.1 % (ref 0–6.9)
ERYTHROCYTE [DISTWIDTH] IN BLOOD BY AUTOMATED COUNT: 42.7 FL (ref 35.9–50)
ETHANOL BLD-MCNC: <10.1 MG/DL (ref 0–10)
GFR SERPLBLD CREATININE-BSD FMLA CKD-EPI: 101 ML/MIN/1.73 M 2
GLOBULIN SER CALC-MCNC: 2.7 G/DL (ref 1.9–3.5)
GLUCOSE SERPL-MCNC: 108 MG/DL (ref 65–99)
HCT VFR BLD AUTO: 48.5 % (ref 42–52)
HGB BLD-MCNC: 16.9 G/DL (ref 14–18)
IMM GRANULOCYTES # BLD AUTO: 0.03 K/UL (ref 0–0.11)
IMM GRANULOCYTES NFR BLD AUTO: 0.4 % (ref 0–0.9)
LYMPHOCYTES # BLD AUTO: 2.35 K/UL (ref 1–4.8)
LYMPHOCYTES NFR BLD: 32.4 % (ref 22–41)
MCH RBC QN AUTO: 31.5 PG (ref 27–33)
MCHC RBC AUTO-ENTMCNC: 34.8 G/DL (ref 33.7–35.3)
MCV RBC AUTO: 90.3 FL (ref 81.4–97.8)
MONOCYTES # BLD AUTO: 0.54 K/UL (ref 0–0.85)
MONOCYTES NFR BLD AUTO: 7.4 % (ref 0–13.4)
NEUTROPHILS # BLD AUTO: 4.29 K/UL (ref 1.82–7.42)
NEUTROPHILS NFR BLD: 59.3 % (ref 44–72)
NRBC # BLD AUTO: 0 K/UL
NRBC BLD-RTO: 0 /100 WBC
PLATELET # BLD AUTO: 287 K/UL (ref 164–446)
PMV BLD AUTO: 11.7 FL (ref 9–12.9)
POTASSIUM SERPL-SCNC: 4 MMOL/L (ref 3.6–5.5)
PROT SERPL-MCNC: 8.1 G/DL (ref 6–8.2)
RBC # BLD AUTO: 5.37 M/UL (ref 4.7–6.1)
SODIUM SERPL-SCNC: 140 MMOL/L (ref 135–145)
TROPONIN T SERPL-MCNC: <6 NG/L (ref 6–19)
WBC # BLD AUTO: 7.3 K/UL (ref 4.8–10.8)

## 2022-05-01 PROCEDURE — 82077 ASSAY SPEC XCP UR&BREATH IA: CPT

## 2022-05-01 PROCEDURE — 700102 HCHG RX REV CODE 250 W/ 637 OVERRIDE(OP): Performed by: EMERGENCY MEDICINE

## 2022-05-01 PROCEDURE — 80053 COMPREHEN METABOLIC PANEL: CPT

## 2022-05-01 PROCEDURE — A9270 NON-COVERED ITEM OR SERVICE: HCPCS | Performed by: EMERGENCY MEDICINE

## 2022-05-01 PROCEDURE — 700105 HCHG RX REV CODE 258: Performed by: EMERGENCY MEDICINE

## 2022-05-01 PROCEDURE — 93005 ELECTROCARDIOGRAM TRACING: CPT

## 2022-05-01 PROCEDURE — 71045 X-RAY EXAM CHEST 1 VIEW: CPT

## 2022-05-01 PROCEDURE — 99284 EMERGENCY DEPT VISIT MOD MDM: CPT

## 2022-05-01 PROCEDURE — 700111 HCHG RX REV CODE 636 W/ 250 OVERRIDE (IP): Performed by: EMERGENCY MEDICINE

## 2022-05-01 PROCEDURE — 84484 ASSAY OF TROPONIN QUANT: CPT

## 2022-05-01 PROCEDURE — 85025 COMPLETE CBC W/AUTO DIFF WBC: CPT

## 2022-05-01 PROCEDURE — 93005 ELECTROCARDIOGRAM TRACING: CPT | Performed by: EMERGENCY MEDICINE

## 2022-05-01 PROCEDURE — 96374 THER/PROPH/DIAG INJ IV PUSH: CPT

## 2022-05-01 RX ORDER — FAMOTIDINE 20 MG/1
20 TABLET, FILM COATED ORAL 2 TIMES DAILY
Qty: 60 TABLET | Refills: 0 | Status: SHIPPED | OUTPATIENT
Start: 2022-05-01 | End: 2022-07-08

## 2022-05-01 RX ORDER — SODIUM CHLORIDE, SODIUM LACTATE, POTASSIUM CHLORIDE, CALCIUM CHLORIDE 600; 310; 30; 20 MG/100ML; MG/100ML; MG/100ML; MG/100ML
1000 INJECTION, SOLUTION INTRAVENOUS ONCE
Status: COMPLETED | OUTPATIENT
Start: 2022-05-01 | End: 2022-05-01

## 2022-05-01 RX ORDER — LORAZEPAM 2 MG/ML
1 INJECTION INTRAMUSCULAR ONCE
Status: COMPLETED | OUTPATIENT
Start: 2022-05-01 | End: 2022-05-01

## 2022-05-01 RX ADMIN — LIDOCAINE HYDROCHLORIDE 30 ML: 20 SOLUTION OROPHARYNGEAL at 10:26

## 2022-05-01 RX ADMIN — SODIUM CHLORIDE, POTASSIUM CHLORIDE, SODIUM LACTATE AND CALCIUM CHLORIDE 1000 ML: 600; 310; 30; 20 INJECTION, SOLUTION INTRAVENOUS at 10:26

## 2022-05-01 RX ADMIN — LORAZEPAM 1 MG: 2 INJECTION INTRAMUSCULAR; INTRAVENOUS at 10:25

## 2022-05-01 ASSESSMENT — ENCOUNTER SYMPTOMS
FOCAL WEAKNESS: 0
ABDOMINAL PAIN: 0
SHORTNESS OF BREATH: 0
VOMITING: 0
COUGH: 0
FEVER: 0
CHILLS: 0
PALPITATIONS: 1
DIARRHEA: 0
HEARTBURN: 1

## 2022-05-01 ASSESSMENT — HEART SCORE
RISK FACTORS: 1-2 RISK FACTORS
HEART SCORE: 1
TROPONIN: LESS THAN OR EQUAL TO NORMAL LIMIT
AGE: <45
HISTORY: SLIGHTLY SUSPICIOUS
ECG: NORMAL

## 2022-05-01 ASSESSMENT — FIBROSIS 4 INDEX: FIB4 SCORE: 0.73

## 2022-05-01 ASSESSMENT — LIFESTYLE VARIABLES: DO YOU DRINK ALCOHOL: YES

## 2022-05-01 NOTE — ED NOTES
Pt to rm 60 from triage.  Agree with triage note.  Pt states hx anxiety.  Was smoking, drinking, gambling all night and had heartburn this am

## 2022-05-01 NOTE — ED PROVIDER NOTES
ED Provider Note    CHIEF COMPLAINT  Chief Complaint   Patient presents with   • Chest Pain     Pt was at [facility] this morning and had increasing sharp L chest pain that radiates down his L arm.        JHONATAN Ho is a 37 y.o. male with a history of depression and anxiety who presents emergency department for evaluation of chest pain.  Patient reports that he is currently on Zoloft for depression, and his significant other is concerned that this may be part of the reason that he is having the chest pain.  Patient states that from approximately 5 PM yesterday to 5 AM this morning he was at the [facility], drinking alcohol, and smoking.  He notes that sometime in the night he was drinking one of the drinks and felt a burning sensation which he said was like severe heartburn.  Over the past hour he has had worsening chest pain which he states is located on the left and radiates down his left arm.  He was feeling lightheaded at home.  He did not pass out.  He denies shortness of breath.  He states that he has had anxiety attacks in the past that felt similar, but were not quite as severe as this.    REVIEW OF SYSTEMS  Review of Systems   Constitutional: Negative for chills and fever.   Respiratory: Negative for cough and shortness of breath.    Cardiovascular: Positive for chest pain and palpitations.   Gastrointestinal: Positive for heartburn. Negative for abdominal pain, diarrhea and vomiting.   Neurological: Negative for focal weakness.   All other systems reviewed and are negative.      PAST MEDICAL HISTORY       SOCIAL HISTORY  Social History     Tobacco Use   • Smoking status: Former Smoker     Packs/day: 0.00     Types: Cigars     Quit date: 2017     Years since quittin.7   • Smokeless tobacco: Current User   • Tobacco comment: once every six months; 2018 E Cig every 4-5 months   Vaping Use   • Vaping Use: Never used   Substance and Sexual Activity   • Alcohol use: Yes     Comment: once a  "month   • Drug use: No   • Sexual activity: Yes     Partners: Female     Comment: GF with infant daughter on the way       SURGICAL HISTORY   has a past surgical history that includes appendectomy (2010) and ventral hernia repair (9/12/2018).    CURRENT MEDICATIONS  Home Medications     Reviewed by Angela Forte R.N. (Registered Nurse) on 05/01/22 at 0909  Med List Status: Complete   Medication Last Dose Status   sertraline (ZOLOFT) 50 MG Tab 4/30/2022 Active                ALLERGIES  No Known Allergies    PHYSICAL EXAM  VITAL SIGNS: /85   Pulse 93   Temp 36.3 °C (97.4 °F) (Temporal)   Resp 18   Ht 1.626 m (5' 4\")   Wt 77.3 kg (170 lb 6.7 oz)   SpO2 99%   BMI 29.25 kg/m²   Pulse ox interpretation: I interpret this pulse ox as normal.  Constitutional: Alert.  Appears uncomfortable and anxious.  HENT: Normocephalic, Atraumatic, Bilateral external ears normal. Nose normal.   Eyes: Pupils are equal and reactive. Conjunctiva normal, non-icteric.   Heart: Regular rate and rhythm, no murmurs appreciated.    Lungs/Thorax: Clear to auscultation bilaterally. No chest wall tenderness  Abdomen: Soft, nontender, normal bowel sounds  Skin: Warm, Dry, No erythema, No rash.   Neurologic: Alert, Grossly non-focal.   Psychiatric: Affect normal, Judgment normal, Mood normal, Appears appropriate and not intoxicated.     Diagnostic Studies:  Results for orders placed or performed during the hospital encounter of 05/01/22   CBC with Differential   Result Value Ref Range    WBC 7.3 4.8 - 10.8 K/uL    RBC 5.37 4.70 - 6.10 M/uL    Hemoglobin 16.9 14.0 - 18.0 g/dL    Hematocrit 48.5 42.0 - 52.0 %    MCV 90.3 81.4 - 97.8 fL    MCH 31.5 27.0 - 33.0 pg    MCHC 34.8 33.7 - 35.3 g/dL    RDW 42.7 35.9 - 50.0 fL    Platelet Count 287 164 - 446 K/uL    MPV 11.7 9.0 - 12.9 fL    Neutrophils-Polys 59.30 44.00 - 72.00 %    Lymphocytes 32.40 22.00 - 41.00 %    Monocytes 7.40 0.00 - 13.40 %    Eosinophils 0.10 0.00 - 6.90 %    Basophils " 0.40 0.00 - 1.80 %    Immature Granulocytes 0.40 0.00 - 0.90 %    Nucleated RBC 0.00 /100 WBC    Neutrophils (Absolute) 4.29 1.82 - 7.42 K/uL    Lymphs (Absolute) 2.35 1.00 - 4.80 K/uL    Monos (Absolute) 0.54 0.00 - 0.85 K/uL    Eos (Absolute) 0.01 0.00 - 0.51 K/uL    Baso (Absolute) 0.03 0.00 - 0.12 K/uL    Immature Granulocytes (abs) 0.03 0.00 - 0.11 K/uL    NRBC (Absolute) 0.00 K/uL   Complete Metabolic Panel (CMP)   Result Value Ref Range    Sodium 140 135 - 145 mmol/L    Potassium 4.0 3.6 - 5.5 mmol/L    Chloride 102 96 - 112 mmol/L    Co2 20 20 - 33 mmol/L    Anion Gap 18.0 (H) 7.0 - 16.0    Glucose 108 (H) 65 - 99 mg/dL    Bun 9 8 - 22 mg/dL    Creatinine 0.99 0.50 - 1.40 mg/dL    Calcium 10.0 8.5 - 10.5 mg/dL    AST(SGOT) 30 12 - 45 U/L    ALT(SGPT) 74 (H) 2 - 50 U/L    Alkaline Phosphatase 92 30 - 99 U/L    Total Bilirubin 0.3 0.1 - 1.5 mg/dL    Albumin 5.4 (H) 3.2 - 4.9 g/dL    Total Protein 8.1 6.0 - 8.2 g/dL    Globulin 2.7 1.9 - 3.5 g/dL    A-G Ratio 2.0 g/dL   Troponin   Result Value Ref Range    Troponin T <6 6 - 19 ng/L   DIAGNOSTIC ALCOHOL   Result Value Ref Range    Diagnostic Alcohol <10.1 0.0 - 10.0 mg/dL   ESTIMATED GFR   Result Value Ref Range    GFR (CKD-EPI) 101 >60 mL/min/1.73 m 2   EKG (NOW)   Result Value Ref Range    Report       [facility] Emergency Dept.    Test Date:  2022  Pt Name:    CHIKIS RODRIGUEZ            Department: ER  MRN:        [id number]                      Room:  Gender:     Male                         Technician: TCM  :        1985                   Requested By:ER TRIAGE PROTOCOL  Order #:    [id number]                    Reading MD: Lilliana Ramon MD    Measurements  Intervals                                Axis  Rate:       73                           P:          45  MA:         139                          QRS:        67  QRSD:       80                           T:          21  QT:         375  QTc:        414    Interpretive  Statements  Sinus rhythm  Normal axis and intervals. No significant ST elevation or depression.  Compared to ECG 07/08/2021 08:00:24  No significant changes  Electronically Signed On 5-1-2022 9:31:39 PDT by Lilliana Ramon MD         DX-CHEST-PORTABLE (1 VIEW)   Final Result      No acute cardiopulmonary abnormality.          These results were personally reviewed and interpreted by me.    COURSE & MEDICAL DECISION MAKING    36 yo male presents for evaluation of chest pain.  Differential diagnosis includes but is not limited to anxiety, gastritis, GERD, pancreatitis, medication reaction, and less likely ACS. Patient is PERC negative (age <50, HR <100, O2 saturation >95%, no unilateral leg swelling, no hemoptysis, no surgery/trauma in last 4 weeks, no history of prior DVT/PE, no exogenous estrogen use), making pulmonary embolus unlikely, therefore further diagnostic workup for pulmonary embolus is not performed at this time.  EKG obtained on arrival shows normal sinus rhythm with no significant ST elevation or depression.  Compared to previous EKG from July 2021 with no significant changes.    HEART score: 1.  Other laboratory studies were unremarkable aside from very slight elevation in ALT at 74.  Troponin testing was obtained as a precaution, and was less than 6.  Repeat troponin was not obtained as the patient has been having pain for more than 4 hours and presentation was not consistent with ACS.  Chest x-ray showed no acute cardiopulmonary process.    HYDRATION: Based on the patient's presentation of Dehydration and GI Upset the patient was given IV fluids. IV Hydration was used because oral hydration was not adequate alone. Upon recheck following hydration, the patient was improved.  He received a GI cocktail and a dose of Ativan for anxiety.  He noted marked improvement and stated resolution of the discomfort.  Symptoms are most consistent with acute alcoholic gastritis.      Given patient's improvement I am  comfortable discharging him home.  Disease course and return precautions were discussed. The patient will return for new or worsening symptoms and is stable at the time of discharge.    The patient is referred to a primary physician for blood pressure management, diabetic screening, and for all other preventative health concerns.      DISPOSITION:  Patient will be discharged home in stable condition.    FOLLOW UP:  Luis Pradhan P.A.-C.  [id number]  [address]  [facility] [id number] [phone]            OUTPATIENT MEDICATIONS:  New Prescriptions    FAMOTIDINE (PEPCID) 20 MG TAB    Take 1 Tablet by mouth 2 times a day.          FINAL IMPRESSION  Visit Diagnoses     ICD-10-CM   1. Chest pain, unspecified type  R07.9   2. Acute alcoholic gastritis without hemorrhage  K29.20              Electronically signed by: Lilliana Ramon M.D., 5/1/2022 9:29 AM

## 2022-05-01 NOTE — ED TRIAGE NOTES
"Chief Complaint   Patient presents with   • Chest Pain     Pt was at [facility] this morning and had increasing sharp L chest pain that radiates down his L arm.      Pt walk in for above. Pt took his normal dose of zoloft last night and was having drinks as well. Pt speaking full sentences, pt denies SOB. Pt states 5/10 chest pain. Explained pt triage process and to notify if there is any change.     /85   Pulse 93   Temp 36.3 °C (97.4 °F) (Temporal)   Resp 18   Ht 1.626 m (5' 4\")   Wt 77.3 kg (170 lb 6.7 oz)   SpO2 99%   BMI 29.25 kg/m²     "

## 2022-06-29 ENCOUNTER — APPOINTMENT (OUTPATIENT)
Dept: MEDICAL GROUP | Facility: MEDICAL CENTER | Age: 37
End: 2022-06-29
Payer: COMMERCIAL

## 2022-07-08 ENCOUNTER — OFFICE VISIT (OUTPATIENT)
Dept: MEDICAL GROUP | Facility: MEDICAL CENTER | Age: 37
End: 2022-07-08
Payer: COMMERCIAL

## 2022-07-08 VITALS
TEMPERATURE: 98.1 F | OXYGEN SATURATION: 97 % | BODY MASS INDEX: 31.04 KG/M2 | SYSTOLIC BLOOD PRESSURE: 112 MMHG | HEIGHT: 64 IN | DIASTOLIC BLOOD PRESSURE: 70 MMHG | HEART RATE: 73 BPM | WEIGHT: 181.8 LBS

## 2022-07-08 DIAGNOSIS — L98.9 SKIN LESIONS: ICD-10-CM

## 2022-07-08 DIAGNOSIS — F41.9 ANXIETY DISORDER, UNSPECIFIED TYPE: ICD-10-CM

## 2022-07-08 PROBLEM — F43.21 GRIEVING: Status: RESOLVED | Noted: 2022-03-23 | Resolved: 2022-07-08

## 2022-07-08 PROBLEM — K59.00 CONSTIPATION: Status: RESOLVED | Noted: 2020-10-15 | Resolved: 2022-07-08

## 2022-07-08 PROCEDURE — 99213 OFFICE O/P EST LOW 20 MIN: CPT | Performed by: PHYSICIAN ASSISTANT

## 2022-07-08 ASSESSMENT — FIBROSIS 4 INDEX: FIB4 SCORE: 0.44

## 2022-07-08 NOTE — PROGRESS NOTES
"Subjective:   Suresh Ho is a 37 y.o. male here today for anxiety disorder and skin lesions on his back.    Anxiety disorder  This is a pleasant 37-year-old male here today to follow-up on his anxiety.  Over the past month has had a few attacks typically at work.  They have been associated with his heart racing.  The stuttering will return.  Was unable to tolerate Zoloft.  Does not wish to try another medication just yet.  Never did follow-up with psychiatry after his hospitalization for anxiety.  He is doing better today.  FMLA has been effective.  Still finds that the anxiety is exacerbated when he is in a group setting.    Skin lesions  Also would like to see a dermatologist.  Has been dealing with 2 lesions one on his right torso on the other close to that on his back.  The one on his back does discharge pus.  The one on the right side he is unable to locate today.  States it is painful at times.       Current medicines (including changes today)  No current outpatient medications on file.     No current facility-administered medications for this visit.     He  has no past medical history on file.    Social History and Family History were reviewed and updated.    ROS   No chest pain, no shortness of breath, no abdominal pain and all other systems were reviewed and are negative.       Objective:     /70 (BP Location: Right arm, Patient Position: Sitting, BP Cuff Size: Adult)   Pulse 73   Temp 36.7 °C (98.1 °F) (Temporal)   Ht 1.626 m (5' 4\")   Wt 82.5 kg (181 lb 12.8 oz)   SpO2 97%  Body mass index is 31.21 kg/m².   Physical Exam:  Constitutional: Alert, no distress.  Skin: Warm, dry, good turgor, no rashes in visible areas.  Eye: Equal, round and reactive, conjunctiva clear, lids normal.  ENMT: Lips without lesions, good dentition, oropharynx clear.  Neck: Trachea midline, no masses.   Lymph: No cervical or supraclavicular lymphadenopathy  Respiratory: Unlabored respiratory effort, lungs " appear clear, no wheezes.  Psych: Alert and oriented x3, normal affect and mood.        Assessment and Plan:   The following treatment plan was discussed    1. Anxiety disorder, unspecified type  Chronic condition.  Stable today.  Refer to psychology for evaluation and treatment.  We will hold off on placing him on another SSRI or SNRI.  Hopefully psychology will be able to give him some tools to prevent exacerbations of anxiety.  - Referral to Psychology    2. Skin lesions  New condition noted in chart but chronic.  1 appears to be a sebaceous cyst.  Will refer to dermatology for evaluation of both lesions.  - Referral to Dermatology         Followup: Return in about 3 months (around 10/8/2022), or if symptoms worsen or fail to improve.    Please note that this dictation was created using voice recognition software. I have made every reasonable attempt to correct obvious errors, but I expect that there are errors of grammar and possibly content that I did not discover before finalizing the note.

## 2022-07-08 NOTE — ASSESSMENT & PLAN NOTE
Also would like to see a dermatologist.  Has been dealing with 2 lesions one on his right torso on the other close to that on his back.  The one on his back does discharge pus.  The one on the right side he is unable to locate today.  States it is painful at times.

## 2022-07-08 NOTE — ASSESSMENT & PLAN NOTE
This is a pleasant 37-year-old male here today to follow-up on his anxiety.  Over the past month has had a few attacks typically at work.  They have been associated with his heart racing.  The stuttering will return.  Was unable to tolerate Zoloft.  Does not wish to try another medication just yet.  Never did follow-up with psychiatry after his hospitalization for anxiety.  He is doing better today.  FMLA has been effective.  Still finds that the anxiety is exacerbated when he is in a group setting.

## 2022-08-31 ENCOUNTER — PATIENT MESSAGE (OUTPATIENT)
Dept: MEDICAL GROUP | Facility: MEDICAL CENTER | Age: 37
End: 2022-08-31

## 2022-08-31 DIAGNOSIS — F41.9 ANXIETY DISORDER, UNSPECIFIED TYPE: ICD-10-CM

## 2022-08-31 RX ORDER — CITALOPRAM HYDROBROMIDE 10 MG/1
10 TABLET ORAL DAILY
Qty: 30 TABLET | Refills: 1 | Status: SHIPPED | OUTPATIENT
Start: 2022-08-31 | End: 2023-04-24

## 2022-09-06 DIAGNOSIS — F41.9 ANXIETY DISORDER, UNSPECIFIED TYPE: ICD-10-CM

## 2022-09-06 DIAGNOSIS — F98.5 ADULT ONSET STUTTERING: ICD-10-CM

## 2022-09-06 DIAGNOSIS — F43.9 STRESS: ICD-10-CM

## 2022-09-15 ENCOUNTER — HOSPITAL ENCOUNTER (EMERGENCY)
Facility: MEDICAL CENTER | Age: 37
End: 2022-09-15
Attending: EMERGENCY MEDICINE
Payer: COMMERCIAL

## 2022-09-15 ENCOUNTER — APPOINTMENT (OUTPATIENT)
Dept: RADIOLOGY | Facility: MEDICAL CENTER | Age: 37
End: 2022-09-15
Attending: EMERGENCY MEDICINE
Payer: COMMERCIAL

## 2022-09-15 VITALS
OXYGEN SATURATION: 96 % | HEART RATE: 99 BPM | TEMPERATURE: 98.1 F | WEIGHT: 181 LBS | DIASTOLIC BLOOD PRESSURE: 72 MMHG | RESPIRATION RATE: 20 BRPM | SYSTOLIC BLOOD PRESSURE: 138 MMHG | HEIGHT: 64 IN | BODY MASS INDEX: 30.9 KG/M2

## 2022-09-15 DIAGNOSIS — K70.9 ALCOHOLIC LIVER DISEASE (HCC): ICD-10-CM

## 2022-09-15 DIAGNOSIS — F10.10 ALCOHOL ABUSE: ICD-10-CM

## 2022-09-15 DIAGNOSIS — F41.1 GENERALIZED ANXIETY DISORDER: ICD-10-CM

## 2022-09-15 DIAGNOSIS — R07.89 ATYPICAL CHEST PAIN: ICD-10-CM

## 2022-09-15 DIAGNOSIS — K22.4 ESOPHAGEAL SPASM: ICD-10-CM

## 2022-09-15 DIAGNOSIS — R79.89 ELEVATED LFTS: ICD-10-CM

## 2022-09-15 LAB
ALBUMIN SERPL BCP-MCNC: 4.7 G/DL (ref 3.2–4.9)
ALBUMIN/GLOB SERPL: 1.7 G/DL
ALP SERPL-CCNC: 79 U/L (ref 30–99)
ALT SERPL-CCNC: 150 U/L (ref 2–50)
ANION GAP SERPL CALC-SCNC: 16 MMOL/L (ref 7–16)
AST SERPL-CCNC: 47 U/L (ref 12–45)
BASOPHILS # BLD AUTO: 0.3 % (ref 0–1.8)
BASOPHILS # BLD: 0.03 K/UL (ref 0–0.12)
BILIRUB SERPL-MCNC: 0.3 MG/DL (ref 0.1–1.5)
BUN SERPL-MCNC: 11 MG/DL (ref 8–22)
CALCIUM SERPL-MCNC: 8.7 MG/DL (ref 8.5–10.5)
CHLORIDE SERPL-SCNC: 105 MMOL/L (ref 96–112)
CO2 SERPL-SCNC: 19 MMOL/L (ref 20–33)
CREAT SERPL-MCNC: 1.14 MG/DL (ref 0.5–1.4)
EKG IMPRESSION: NORMAL
EOSINOPHIL # BLD AUTO: 0.01 K/UL (ref 0–0.51)
EOSINOPHIL NFR BLD: 0.1 % (ref 0–6.9)
ERYTHROCYTE [DISTWIDTH] IN BLOOD BY AUTOMATED COUNT: 44.8 FL (ref 35.9–50)
GFR SERPLBLD CREATININE-BSD FMLA CKD-EPI: 85 ML/MIN/1.73 M 2
GLOBULIN SER CALC-MCNC: 2.7 G/DL (ref 1.9–3.5)
GLUCOSE SERPL-MCNC: 106 MG/DL (ref 65–99)
HCT VFR BLD AUTO: 44.8 % (ref 42–52)
HGB BLD-MCNC: 15.7 G/DL (ref 14–18)
IMM GRANULOCYTES # BLD AUTO: 0.04 K/UL (ref 0–0.11)
IMM GRANULOCYTES NFR BLD AUTO: 0.4 % (ref 0–0.9)
LYMPHOCYTES # BLD AUTO: 2.31 K/UL (ref 1–4.8)
LYMPHOCYTES NFR BLD: 22.1 % (ref 22–41)
MCH RBC QN AUTO: 32.4 PG (ref 27–33)
MCHC RBC AUTO-ENTMCNC: 35 G/DL (ref 33.7–35.3)
MCV RBC AUTO: 92.4 FL (ref 81.4–97.8)
MONOCYTES # BLD AUTO: 0.89 K/UL (ref 0–0.85)
MONOCYTES NFR BLD AUTO: 8.5 % (ref 0–13.4)
NEUTROPHILS # BLD AUTO: 7.16 K/UL (ref 1.82–7.42)
NEUTROPHILS NFR BLD: 68.6 % (ref 44–72)
NRBC # BLD AUTO: 0 K/UL
NRBC BLD-RTO: 0 /100 WBC
PLATELET # BLD AUTO: 269 K/UL (ref 164–446)
PMV BLD AUTO: 11.5 FL (ref 9–12.9)
POTASSIUM SERPL-SCNC: 3.9 MMOL/L (ref 3.6–5.5)
PROT SERPL-MCNC: 7.4 G/DL (ref 6–8.2)
RBC # BLD AUTO: 4.85 M/UL (ref 4.7–6.1)
SODIUM SERPL-SCNC: 140 MMOL/L (ref 135–145)
TROPONIN T SERPL-MCNC: 8 NG/L (ref 6–19)
WBC # BLD AUTO: 10.4 K/UL (ref 4.8–10.8)

## 2022-09-15 PROCEDURE — 700102 HCHG RX REV CODE 250 W/ 637 OVERRIDE(OP): Performed by: EMERGENCY MEDICINE

## 2022-09-15 PROCEDURE — A9270 NON-COVERED ITEM OR SERVICE: HCPCS | Performed by: EMERGENCY MEDICINE

## 2022-09-15 PROCEDURE — 93005 ELECTROCARDIOGRAM TRACING: CPT | Performed by: EMERGENCY MEDICINE

## 2022-09-15 PROCEDURE — 700111 HCHG RX REV CODE 636 W/ 250 OVERRIDE (IP): Performed by: EMERGENCY MEDICINE

## 2022-09-15 PROCEDURE — 96372 THER/PROPH/DIAG INJ SC/IM: CPT

## 2022-09-15 PROCEDURE — 71045 X-RAY EXAM CHEST 1 VIEW: CPT

## 2022-09-15 PROCEDURE — 84484 ASSAY OF TROPONIN QUANT: CPT

## 2022-09-15 PROCEDURE — 80053 COMPREHEN METABOLIC PANEL: CPT

## 2022-09-15 PROCEDURE — 85025 COMPLETE CBC W/AUTO DIFF WBC: CPT

## 2022-09-15 PROCEDURE — 99285 EMERGENCY DEPT VISIT HI MDM: CPT

## 2022-09-15 PROCEDURE — 93005 ELECTROCARDIOGRAM TRACING: CPT

## 2022-09-15 PROCEDURE — 36415 COLL VENOUS BLD VENIPUNCTURE: CPT

## 2022-09-15 RX ORDER — DICYCLOMINE HYDROCHLORIDE 10 MG/ML
20 INJECTION INTRAMUSCULAR ONCE
Status: COMPLETED | OUTPATIENT
Start: 2022-09-15 | End: 2022-09-15

## 2022-09-15 RX ORDER — DICYCLOMINE HCL 20 MG
20 TABLET ORAL EVERY 6 HOURS
Qty: 20 TABLET | Refills: 0 | Status: SHIPPED | OUTPATIENT
Start: 2022-09-15 | End: 2023-04-24

## 2022-09-15 RX ORDER — LORAZEPAM 1 MG/1
1 TABLET ORAL EVERY 8 HOURS PRN
Qty: 25 TABLET | Refills: 0 | Status: SHIPPED | OUTPATIENT
Start: 2022-09-15 | End: 2023-07-14 | Stop reason: SDUPTHER

## 2022-09-15 RX ORDER — LORAZEPAM 1 MG/1
1 TABLET ORAL ONCE
Status: COMPLETED | OUTPATIENT
Start: 2022-09-15 | End: 2022-09-15

## 2022-09-15 RX ADMIN — LORAZEPAM 1 MG: 1 TABLET ORAL at 17:28

## 2022-09-15 RX ADMIN — DICYCLOMINE HYDROCHLORIDE 20 MG: 20 INJECTION, SOLUTION INTRAMUSCULAR at 18:36

## 2022-09-15 ASSESSMENT — FIBROSIS 4 INDEX: FIB4 SCORE: 0.44

## 2022-09-15 NOTE — ED TRIAGE NOTES
Suresh Trinhaneta Ho  37 y.o. male  Chief Complaint   Patient presents with    Chest Pain     Onset 10/10 CP at 1400 today. 2/10 L sided CP with left arm numbness in ED after 3 x SL nitro, 324 ASA, 1 L NS with EMS PTA. Hx anxiety, this feels similar but more severe than typical anxiety attack. Pt was drinking until 0600 this am. Takes celexa daily for anxiety     Pt BIB EMS for above complaint.     FSBG 108    EMS medication  1L NS  324 ASA  3 x 0.4 Nitro SL    Pt is GCS 15, speaking in full sentences, follows commands and responds appropriately to questions. Resp are even and unlabored.     CP protocol ordered.  EKG performed.    This RN masked and in appropriate PPE during encounter.     Vitals:    09/15/22 1613   BP: 135/86   Pulse: 100   Resp: 16   Temp: 37.1 °C (98.8 °F)   SpO2: 94%

## 2022-09-16 NOTE — DISCHARGE INSTRUCTIONS
Follow-up with your primary care provider this week for further evaluation of your anxiety, alcohol abuse and noncardiac GI related chest pain.  You need to cut back on your alcohol use as your liver enzymes are elevated and you are starting to show signs of alcoholic liver disease.  Consider a job change to help reduce your stress and help with your home life as this will decrease your anxiety as well.  Speak with your primary care provider about possibly going on Paxil as this is a good anxiety/antidepressant.  Exercise if you can by walking daily as this will help decrease your anxiety and help with your stress.  Take medications as directed and only as needed.

## 2022-09-16 NOTE — ED PROVIDER NOTES
"ED Provider Note     Scribed for Lilliana Mendiola D.O. by Xiang Ni. 9/15/2022, 5:08 PM.     Primary care provider: Luis Pradhan P.A.-C.  Means of arrival: EMS         History obtained from: Patient  History limited by: None    CHIEF COMPLAINT  Chief Complaint   Patient presents with    Chest Pain     Onset 10/10 CP at 1400 today. 2/10 L sided CP with left arm numbness in ED after 3 x SL nitro, 324 ASA, 1 L NS with EMS PTA. Hx anxiety, this feels similar but more severe than typical anxiety attack. Pt was drinking until 0600 this am. Takes celexa daily for anxiety     HPI  Suresh Ho is a 37 y.o. male who presents to the emergency Department via EMS for acute, mild chest pain onset 2 PM today. The patient has been experiencing chest \"pressure\" that radiates down his left arm and into his neck. He says he drank a lot of alcohol and smoked yesterday but denies any drug use. He says he doesn't smoke normally. Denies any falls or trauma. Denies shortness of breath. He has a history of anxiety, but denies any other medical history.  Patient has been to the emergency department 4 times for this same problem and has been admitted in the past with negative work-ups for his cardiac evaluation.  He has been diagnosed with anxiety and is under a lot of stress at his job    REVIEW OF SYSTEMS  Pertinent positives include chest pain. Pertinent negatives include no shortness of breath.   See HPI for further details. All other systems are negative.    PAST MEDICAL HISTORY  History reviewed. No pertinent past medical history.    FAMILY HISTORY  Family History   Problem Relation Age of Onset    Cancer Maternal Grandfather         Prostate       SOCIAL HISTORY  Social History     Tobacco Use    Smoking status: Former     Packs/day: 0.00     Types: Cigars, Cigarettes     Quit date: 2017     Years since quittin.0    Smokeless tobacco: Current    Tobacco comments:     once every six months; 2018 E Cig every " "4-5 months   Vaping Use    Vaping Use: Never used   Substance Use Topics    Alcohol use: Yes     Comment: 2 days/week, 6 drinks/day    Drug use: No      Social History     Substance and Sexual Activity   Drug Use No       SURGICAL HISTORY  Past Surgical History:   Procedure Laterality Date    VENTRAL HERNIA REPAIR  9/12/2018    Procedure: VENTRAL HERNIA REPAIR - W/MESH;  Surgeon: Katherine Brennan M.D.;  Location: SURGERY [facility];  Service: General    APPENDECTOMY  2010       CURRENT MEDICATIONS  No current facility-administered medications for this encounter.    Current Outpatient Medications:     citalopram (CELEXA) 10 MG tablet, Take 1 Tablet by mouth every day. Take half a tablet first 5 days., Disp: 30 Tablet, Rfl: 1    ALLERGIES  No Known Allergies    PHYSICAL EXAM  VITAL SIGNS: /81   Pulse 97   Temp 37.1 °C (98.7 °F) (Temporal)   Resp 16   Ht 1.626 m (5' 4\")   Wt 82.1 kg (181 lb)   SpO2 95%   BMI 31.07 kg/m²     Constitutional: Patient is well developed, well nourished. Non-toxic appearing. Anxious.   HENT: Normocephalic, atraumatic.  Moist oral mucosa.  Cardiovascular: Normal heart rate and Regular rhythm. No murmur  Thorax & Lungs: Clear and equal breath sounds with good excursion. No respiratory distress, no rhonchi, wheezing or rales.  Abdomen: Bowel sounds normal in all four quadrants. Soft,nontender  Skin: Warm, Dry  Extremities: Peripheral pulses 4/4 No edema, No tenderness  Musculoskeletal: Normal range of motion in all major joints. No tenderness to palpation or major deformities noted.   Neurologic: Alert & oriented x 3, Normal motor function, Normal sensory function.  Psychiatric: Affect flat and Anxious.     DIAGNOSTICS/PROCEDURES    LABS  Results for orders placed or performed during the hospital encounter of 09/15/22   CBC with Differential   Result Value Ref Range    WBC 10.4 4.8 - 10.8 K/uL    RBC 4.85 4.70 - 6.10 M/uL    Hemoglobin 15.7 14.0 - 18.0 g/dL    Hematocrit " 44.8 42.0 - 52.0 %    MCV 92.4 81.4 - 97.8 fL    MCH 32.4 27.0 - 33.0 pg    MCHC 35.0 33.7 - 35.3 g/dL    RDW 44.8 35.9 - 50.0 fL    Platelet Count 269 164 - 446 K/uL    MPV 11.5 9.0 - 12.9 fL    Neutrophils-Polys 68.60 44.00 - 72.00 %    Lymphocytes 22.10 22.00 - 41.00 %    Monocytes 8.50 0.00 - 13.40 %    Eosinophils 0.10 0.00 - 6.90 %    Basophils 0.30 0.00 - 1.80 %    Immature Granulocytes 0.40 0.00 - 0.90 %    Nucleated RBC 0.00 /100 WBC    Neutrophils (Absolute) 7.16 1.82 - 7.42 K/uL    Lymphs (Absolute) 2.31 1.00 - 4.80 K/uL    Monos (Absolute) 0.89 (H) 0.00 - 0.85 K/uL    Eos (Absolute) 0.01 0.00 - 0.51 K/uL    Baso (Absolute) 0.03 0.00 - 0.12 K/uL    Immature Granulocytes (abs) 0.04 0.00 - 0.11 K/uL    NRBC (Absolute) 0.00 K/uL   Complete Metabolic Panel (CMP)   Result Value Ref Range    Sodium 140 135 - 145 mmol/L    Potassium 3.9 3.6 - 5.5 mmol/L    Chloride 105 96 - 112 mmol/L    Co2 19 (L) 20 - 33 mmol/L    Anion Gap 16.0 7.0 - 16.0    Glucose 106 (H) 65 - 99 mg/dL    Bun 11 8 - 22 mg/dL    Creatinine 1.14 0.50 - 1.40 mg/dL    Calcium 8.7 8.5 - 10.5 mg/dL    AST(SGOT) 47 (H) 12 - 45 U/L    ALT(SGPT) 150 (H) 2 - 50 U/L    Alkaline Phosphatase 79 30 - 99 U/L    Total Bilirubin 0.3 0.1 - 1.5 mg/dL    Albumin 4.7 3.2 - 4.9 g/dL    Total Protein 7.4 6.0 - 8.2 g/dL    Globulin 2.7 1.9 - 3.5 g/dL    A-G Ratio 1.7 g/dL   Troponin   Result Value Ref Range    Troponin T 8 6 - 19 ng/L   ESTIMATED GFR   Result Value Ref Range    GFR (CKD-EPI) 85 >60 mL/min/1.73 m 2   EKG   Result Value Ref Range    Report       [facility] Emergency Dept.    Test Date:  2022-09-15  Pt Name:    CHIKIS RODRIGUEZ            Department: ER  MRN:        [id number]                      Room:       TRAUMA - EXAM 1  Gender:     Male                         Technician: [id number]  :        1985                   Requested By:ER TRIAGE PROTOCOL  Order #:    [id number]                    Reading MD: SERA KENDALL,  DO    Measurements  Intervals                                Axis  Rate:       101                          P:          36  NM:         148                          QRS:        31  QRSD:       78                           T:          18  QT:         341  QTc:        442    Interpretive Statements  Sinus tachycardia  Compared to ECG 05/01/2022 09:02:57  Sinus rhythm no longer present  ST (T wave) deviation no longer present  Electronically Signed On 9- 18:58:04 PDT by SERA KENDALL, DO       Labs reviewed by me.    EKG  12 Lead EKG interpreted by me as shown above.    RADIOLOGY/PROCEDURES  DX-CHEST-PORTABLE (1 VIEW)   Final Result      No acute cardiopulmonary disease.        Results and radiologist interpretation reviewed by me.     COURSE & MEDICAL DECISION MAKING  Pertinent Labs & Imaging studies reviewed. (See chart for details)    5:08 PM - Patient seen and evaluated at bedside. Discussed plan of care, including labs and radiology. Patient agrees to plan of care. Ordered for DX-Chest, CBC w/ Diff, CMP, Troponin, and EKG to evaluate. Patient will be treated with Ativan 1 mg PO for his symptoms. Differential diagnoses include, but are not limited to, Anxiety vs. Coronary Artery Disease    6:23 PM - Patient was reevaluated at bedside and feels improved at this time. I informed him I suspect his symptoms may be GI related rather than heart. Patient will be treated with Bentyl 20 mg injection for his symptoms.  Patient is currently under the care of his primary care doctor who has referred him to a clinical psychologist.  He is considering job changes at work.  At this time I will give him a small prescription for. Ativan  Along with Bentyl for his GI symptoms.The patient will return for new or worsening symptoms and is stable at the time of discharge.    In prescribing controlled substances to this patient, I certify that I have obtained and reviewed the medical history of Suresh Ho. I have also  made a good nina effort to obtain applicable records from other providers who have treated the patient and records did not demonstrate any increased risk of substance abuse that would prevent me from prescribing controlled substances.     I have conducted a physical exam and documented it. I have reviewed Mr. Ho’s prescription history as maintained by the [place] Prescription Monitoring Program.     I have assessed the patient’s risk for abuse, dependency, and addiction using the validated Opioid Risk Tool available at https://www.mdcalc.com/vucafb-zefs-nlqu-ort-narcotic-abuse.     Given the above, I believe the benefits of controlled substance therapy outweigh the risks. The reasons for prescribing controlled substances include non-narcotic, oral analgesic alternatives have been inadequate for pain control. Accordingly, I have discussed the risk and benefits, treatment plan, and alternative therapies with the patient.     DISPOSITION:  Patient will be discharged home in stable condition.    FOLLOW UP:  Luis Pradhan P.A.-C.  [address]  [address]  [facility] [id number] [phone]    Schedule an appointment as soon as possible for a visit in 1 day      OUTPATIENT MEDICATIONS:  New Prescriptions    DICYCLOMINE (BENTYL) 20 MG TAB    Take 1 Tablet by mouth every 6 hours. As needed for chest tightness/GI symptoms    LORAZEPAM (ATIVAN) 1 MG TAB    Take 1 Tablet by mouth every 8 hours as needed for Anxiety for up to 10 days.     FINAL IMPRESSION  1. Atypical chest pain    2. Generalized anxiety disorder    3. Alcoholic liver disease (HCC)    4. Elevated LFTs    5. Alcohol abuse    6. Esophageal spasm       Xiang PINEDA (Radha), am scribing for, and in the presence of, Lilliana Mendiola D.O..    Electronically signed by: Xiang Ni (Radha), 9/15/2022    Lilliana PINEDA D.O. personally performed the services described in this documentation, as scribed by Xiang Ni in my presence, and it is both  accurate and complete.    The note accurately reflects work and decisions made by me.  Lilliana Mendiola D.O.  9/15/2022  11:19 PM

## 2022-09-18 ENCOUNTER — OFFICE VISIT (OUTPATIENT)
Dept: URGENT CARE | Facility: PHYSICIAN GROUP | Age: 37
End: 2022-09-18
Payer: COMMERCIAL

## 2022-09-18 VITALS
BODY MASS INDEX: 30.73 KG/M2 | HEIGHT: 64 IN | SYSTOLIC BLOOD PRESSURE: 148 MMHG | HEART RATE: 89 BPM | WEIGHT: 180 LBS | DIASTOLIC BLOOD PRESSURE: 84 MMHG | TEMPERATURE: 97.3 F | RESPIRATION RATE: 16 BRPM | OXYGEN SATURATION: 95 %

## 2022-09-18 DIAGNOSIS — H00.014 HORDEOLUM EXTERNUM OF LEFT UPPER EYELID: ICD-10-CM

## 2022-09-18 PROCEDURE — 99213 OFFICE O/P EST LOW 20 MIN: CPT | Performed by: FAMILY MEDICINE

## 2022-09-18 RX ORDER — POLYMYXIN B SULFATE AND TRIMETHOPRIM 1; 10000 MG/ML; [USP'U]/ML
1 SOLUTION OPHTHALMIC 4 TIMES DAILY
Qty: 10 ML | Refills: 0 | Status: SHIPPED | OUTPATIENT
Start: 2022-09-18 | End: 2022-10-24

## 2022-09-18 ASSESSMENT — FIBROSIS 4 INDEX: FIB4 SCORE: 0.51

## 2022-09-18 NOTE — PROGRESS NOTES
"Subjective:       Chief Complaint   Patient presents with    Eye Problem     Swelling, x2days                Eye Problem   The right eye is affected. This is a new problem. The current episode started in the past 2 days. The problem occurs constantly. The problem has been gradually worsening. There was no injury mechanism. The pain is mild. There is no known exposure to pink eye.    he does not wear contacts. Associated symptoms include eye redness (upper lid). Pertinent negatives include no blurred vision, eye discharge, double vision, fever, itching or photophobia. Pt has tried nothing for the symptoms.     Past medical history was unremarkable and not pertinent to current issue  Social hx - denies tobacco, alcohol, drug use  Family hx was reviewed - no pertinent past family hx    Review of Systems   Constitutional: Negative for fever.   Eyes: Positive for redness (upper lid). Negative for blurred vision, double vision, photophobia and discharge.   Skin: Negative for itching.   All other systems reviewed and are negative.         Objective:     BP (!) 148/84   Pulse 89   Temp 36.3 °C (97.3 °F) (Temporal)   Resp 16   Ht 1.626 m (5' 4\")   Wt 81.6 kg (180 lb)   SpO2 95%     Physical Exam   Constitutional: pt is oriented to person, place, and time. Pt appears well-developed and well-nourished. No distress.   HENT:   Head: Normocephalic and atraumatic.   Eyes: Conjunctivae and EOM are normal. Pupils are equal, round, and reactive to light. Right conjunctiva is not injected. Left conjunctiva is not injected.       Tender, erythematous nodule, right upper eyelid.  + minor edema.   No sx periorbital cellulitis.   No proptosis, ophthalmoplegia.       Cardiovascular: Normal rate.    Pulmonary/Chest: Effort normal.   Neurological: pt is alert and oriented to person, place, and time.  no cranial nerve deficit  Skin: Skin is warm. Pt is not diaphoretic. No erythema.   Nursing note and vitals reviewed.            "   Assessment/Plan:       1. Hordeolum externum of left upper eyelid     - polymixin-trimethoprim (POLYTRIM) 42034-6.1 UNIT/ML-% Solution; Administer 1 Drop into the left eye 4 times a day.  Dispense: 10 mL; Refill: 0    Differential diagnosis, natural history, supportive care, and indications for immediate follow-up discussed. All questions answered. Patient agrees with the plan of care.     Follow-up as needed if symptoms worsen or fail to improve to PCP, Urgent care or Emergency Room.     I have personally reviewed prior external notes and test results pertinent to today's visit.  I have independently reviewed and interpreted all diagnostics ordered during this urgent care acute visit.

## 2022-09-22 ENCOUNTER — OFFICE VISIT (OUTPATIENT)
Dept: URGENT CARE | Facility: PHYSICIAN GROUP | Age: 37
End: 2022-09-22
Payer: COMMERCIAL

## 2022-09-22 VITALS
TEMPERATURE: 98.5 F | SYSTOLIC BLOOD PRESSURE: 124 MMHG | DIASTOLIC BLOOD PRESSURE: 82 MMHG | OXYGEN SATURATION: 98 % | RESPIRATION RATE: 18 BRPM | BODY MASS INDEX: 30.37 KG/M2 | WEIGHT: 189 LBS | HEIGHT: 66 IN | HEART RATE: 70 BPM

## 2022-09-22 DIAGNOSIS — H00.024 HORDEOLUM INTERNUM OF LEFT UPPER EYELID: ICD-10-CM

## 2022-09-22 PROCEDURE — 99213 OFFICE O/P EST LOW 20 MIN: CPT | Performed by: PHYSICIAN ASSISTANT

## 2022-09-22 ASSESSMENT — ENCOUNTER SYMPTOMS
PHOTOPHOBIA: 0
DOUBLE VISION: 0
CONSTITUTIONAL NEGATIVE: 1
EYE PAIN: 0
MUSCULOSKELETAL NEGATIVE: 1
EYE DISCHARGE: 0
NEUROLOGICAL NEGATIVE: 1
BLURRED VISION: 0
EYE REDNESS: 0

## 2022-09-22 ASSESSMENT — FIBROSIS 4 INDEX: FIB4 SCORE: 0.51

## 2022-09-22 NOTE — PROGRESS NOTES
"  Subjective:     Suresh Ho  is a 37 y.o. male who presents for Stye (Follow up, no improvement with medication pt states)       He presents today for a left upper eyelid stye that has been ongoing over the last 7 days.  Has been taking over-the-counter ibuprofen and doing warm compresses since symptom onset.  Denies any changes in vision, denies any foreign body sensation, denies eye pain.  No fever/chills/sweats, chest pain, shortness of breath.  He was seen in the urgent care on 9/18/2022 for same symptoms, was given antibiotic drops     Review of Systems   Constitutional: Negative.    HENT: Negative.     Eyes:  Negative for blurred vision, double vision, photophobia, pain, discharge and redness.        Left upper eyelid stye   Musculoskeletal: Negative.    Neurological: Negative.     No Known Allergies  History reviewed. No pertinent past medical history.     Objective:   /82 (BP Location: Left arm, Patient Position: Sitting, BP Cuff Size: Adult)   Pulse 70   Temp 36.9 °C (98.5 °F) (Temporal)   Resp 18   Ht 1.676 m (5' 6\")   Wt 85.7 kg (189 lb)   SpO2 98%   BMI 30.51 kg/m²   Physical Exam  Vitals and nursing note reviewed.   Constitutional:       General: He is not in acute distress.     Appearance: He is not ill-appearing or toxic-appearing.   HENT:      Head: Normocephalic.      Nose: No rhinorrhea.   Eyes:      General: No scleral icterus.     Extraocular Movements: Extraocular movements intact.      Conjunctiva/sclera: Conjunctivae normal.      Pupils: Pupils are equal, round, and reactive to light.      Comments: Fluorescein dye examination is negative for corneal pathology.  There is a stye present on the lateral one half of the left upper eyelid.  Mildly tender to palpation.  No purulence noted.   Pulmonary:      Effort: Pulmonary effort is normal. No respiratory distress.      Breath sounds: No stridor.   Musculoskeletal:      Cervical back: Neck supple.   Neurological:      " Mental Status: He is alert and oriented to person, place, and time.   Psychiatric:         Mood and Affect: Mood normal.         Behavior: Behavior normal.         Thought Content: Thought content normal.         Judgment: Judgment normal.           Diagnostic testing: None    Assessment/Plan:     Encounter Diagnoses   Name Primary?    Hordeolum internum of left upper eyelid         Plan for care for today's complaint includes continuing of over-the-counter medications for symptom support.  Continue warm compresses and gentle massage to promote expression of the stye.  Fluorescein dye examination stain was negative for corneal pathology, no antibiotics needed at this time.  Continue to monitor symptoms and return to urgent care or follow-up with primary care provider if symptoms remain ongoing.  Follow-up in the emergency department if symptoms become severe, ER precautions discussed in office today..    See AVS Instructions below for written guidance provided to patient on after-visit management and care in addition to our verbal discussion during the visit.    Please note that this dictation was created using voice recognition software. I have attempted to correct all errors, but there may be sound-alike, spelling, grammar and possibly content errors that I did not discover before finalizing the note.    Kojo High PA-C

## 2022-10-24 ENCOUNTER — OFFICE VISIT (OUTPATIENT)
Dept: MEDICAL GROUP | Facility: MEDICAL CENTER | Age: 37
End: 2022-10-24
Payer: COMMERCIAL

## 2022-10-24 VITALS
TEMPERATURE: 98.3 F | BODY MASS INDEX: 29.7 KG/M2 | HEIGHT: 66 IN | WEIGHT: 184.8 LBS | SYSTOLIC BLOOD PRESSURE: 120 MMHG | OXYGEN SATURATION: 99 % | DIASTOLIC BLOOD PRESSURE: 54 MMHG | HEART RATE: 85 BPM

## 2022-10-24 DIAGNOSIS — F41.9 ANXIETY DISORDER, UNSPECIFIED TYPE: ICD-10-CM

## 2022-10-24 DIAGNOSIS — R14.0 ABDOMINAL BLOATING: ICD-10-CM

## 2022-10-24 PROCEDURE — 99214 OFFICE O/P EST MOD 30 MIN: CPT | Performed by: PHYSICIAN ASSISTANT

## 2022-10-24 ASSESSMENT — FIBROSIS 4 INDEX: FIB4 SCORE: 0.51

## 2022-10-24 NOTE — PROGRESS NOTES
"Subjective:   Suresh Ho is a 37 y.o. male here today for anxiety disorder and abdominal bloating.    Anxiety disorder  This is a pleasant 37-year-old male here today to follow-up on his history of anxiety.  Has been off of work.  He is currently on FMLA.  Is off until 9 January.  Is doing much better with his anxiety.  States that he has a plan of action at work when he returns.  May consider a new position in the company.  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  Currently taking Celexa at 5 mg.  Decided not to take more than that dose.  Denies any exacerbation recently anxiety.  The stuttering has improved a lot over the past month.  Stuttering will only occur currently when he is in high stress situations.    Abdominal bloating  Chronic condition.  Still has intermittent issues with abdominal bloating.  No exacerbation recently.  Has been having these symptoms for several years.  Has tried to watch dietary triggers.       Current medicines (including changes today)  Current Outpatient Medications   Medication Sig Dispense Refill    dicyclomine (BENTYL) 20 MG Tab Take 1 Tablet by mouth every 6 hours. As needed for chest tightness/GI symptoms 20 Tablet 0    citalopram (CELEXA) 10 MG tablet Take 1 Tablet by mouth every day. Take half a tablet first 5 days. 30 Tablet 1     No current facility-administered medications for this visit.     He  has no past medical history on file.    Social History and Family History were reviewed and updated.    ROS   No chest pain, no shortness of breath, no abdominal pain and all other systems were reviewed and are negative.       Objective:     /54 (BP Location: Right arm, Patient Position: Sitting, BP Cuff Size: Large adult)   Pulse 85   Temp 36.8 °C (98.3 °F) (Temporal)   Ht 1.676 m (5' 6\")   Wt 83.8 kg (184 lb 12.8 oz)   SpO2 99%  Body mass index is 29.83 kg/m².   Physical Exam:  Constitutional: Alert, no distress.  Skin: " Warm, dry, good turgor, no rashes in visible areas.  Eye: Equal, round and reactive, conjunctiva clear, lids normal.  ENMT: Lips without lesions, good dentition, oropharynx clear.  Neck: Trachea midline, no masses.   Lymph: No cervical or supraclavicular lymphadenopathy  Respiratory: Unlabored respiratory effort, lungs appear clear.  Psych: Alert and oriented x3, normal affect and mood.        Assessment and Plan:   The following treatment plan was discussed    1. Anxiety disorder, unspecified type  Chronic condition.  Stable.  Continue Celexa at 5 mg daily.  Await further [facility] paperwork to complete.  Continue leave of absence from work.    2. Abdominal bloating  Chronic condition.  Stable.  We will continue to monitor.    He had requested thyroid labs but those were ordered in April.  They were normal range.    Followup: Return in about 6 months (around 4/24/2023), or if symptoms worsen or fail to improve.    Please note that this dictation was created using voice recognition software. I have made every reasonable attempt to correct obvious errors, but I expect that there are errors of grammar and possibly content that I did not discover before finalizing the note.

## 2022-10-24 NOTE — ASSESSMENT & PLAN NOTE
Chronic condition.  Still has intermittent issues with abdominal bloating.  No exacerbation recently.  Has been having these symptoms for several years.  Has tried to watch dietary triggers.

## 2022-10-24 NOTE — ASSESSMENT & PLAN NOTE
This is a pleasant 37-year-old male here today to follow-up on his history of anxiety.  Has been off of work.  He is currently on FMLA.  Is off until 9 January.  Is doing much better with his anxiety.  States that he has a plan of action at work when he returns.  May consider a new position in the company.  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  Currently taking Celexa at 5 mg.  Decided not to take more than that dose.  Denies any exacerbation recently anxiety.  The stuttering has improved a lot over the past month.  Stuttering will only occur currently when he is in high stress situations.

## 2023-04-24 ENCOUNTER — OFFICE VISIT (OUTPATIENT)
Dept: MEDICAL GROUP | Facility: MEDICAL CENTER | Age: 38
End: 2023-04-24
Payer: COMMERCIAL

## 2023-04-24 VITALS
OXYGEN SATURATION: 98 % | BODY MASS INDEX: 30.05 KG/M2 | DIASTOLIC BLOOD PRESSURE: 70 MMHG | HEART RATE: 88 BPM | WEIGHT: 187 LBS | SYSTOLIC BLOOD PRESSURE: 104 MMHG | HEIGHT: 66 IN | TEMPERATURE: 98.4 F

## 2023-04-24 DIAGNOSIS — H57.12 ACUTE LEFT EYE PAIN: ICD-10-CM

## 2023-04-24 DIAGNOSIS — R14.0 ABDOMINAL BLOATING: ICD-10-CM

## 2023-04-24 DIAGNOSIS — Z11.59 ENCOUNTER FOR HEPATITIS C SCREENING TEST FOR LOW RISK PATIENT: ICD-10-CM

## 2023-04-24 DIAGNOSIS — Z00.00 PREVENTATIVE HEALTH CARE: ICD-10-CM

## 2023-04-24 DIAGNOSIS — F41.1 GAD (GENERALIZED ANXIETY DISORDER): ICD-10-CM

## 2023-04-24 PROCEDURE — 99214 OFFICE O/P EST MOD 30 MIN: CPT | Performed by: PHYSICIAN ASSISTANT

## 2023-04-24 RX ORDER — OMEPRAZOLE 20 MG/1
20 CAPSULE, DELAYED RELEASE ORAL DAILY
Qty: 30 CAPSULE | Refills: 2 | Status: SHIPPED | OUTPATIENT
Start: 2023-04-24 | End: 2023-05-24

## 2023-04-24 ASSESSMENT — FIBROSIS 4 INDEX: FIB4 SCORE: 0.53

## 2023-04-24 ASSESSMENT — PATIENT HEALTH QUESTIONNAIRE - PHQ9: CLINICAL INTERPRETATION OF PHQ2 SCORE: 0

## 2023-04-24 NOTE — ASSESSMENT & PLAN NOTE
Still has intermittent upper to left sided abdominal bloating.  Denies any nausea or vomiting.  Occasionally will have GERD symptoms.  History of rectal bleeding but nothing recently.  Family history with a cousin who was recently diagnosed with colon cancer.  They are similar ages.

## 2023-04-24 NOTE — ASSESSMENT & PLAN NOTE
Doing much better with his anxiety.  Work is going well.  No longer drinking alcohol which has been beneficial.  Eating healthier.  Stopped citalopram.

## 2023-04-24 NOTE — PROGRESS NOTES
"Subjective:   Suresh Ho is a 38 y.o. male here today for acute left eye pain and abdominal bloating history.    Acute left eye pain  This is a pleasant 38-year-old male who is here today to follow-up on his health.  Complains of having left eye pain over the past few days.  Eyes slightly red.  Has been seen in the past by 2 eye specialist.  Nothing was noted.  He did have a full examination.  Was provided some antibiotic eyedrops that he has been using.  Pain was worse yesterday where he could not open his eye.  Denies any current issues such as tearing.  No photophobia.  No discharge.    MILA (generalized anxiety disorder)  Doing much better with his anxiety.  Work is going well.  No longer drinking alcohol which has been beneficial.  Eating healthier.  Stopped citalopram.    Abdominal bloating  Still has intermittent upper to left sided abdominal bloating.  Denies any nausea or vomiting.  Occasionally will have GERD symptoms.  History of rectal bleeding but nothing recently.  Family history with a cousin who was recently diagnosed with colon cancer.  They are similar ages.       Current medicines (including changes today)  Current Outpatient Medications   Medication Sig Dispense Refill    omeprazole (PRILOSEC) 20 MG delayed-release capsule Take 1 Capsule by mouth every day for 30 days. 1/2 hour prior to same meal. 30 Capsule 2     No current facility-administered medications for this visit.     He  has no past medical history on file.    Social History and Family History were reviewed and updated.    ROS   No chest pain, no shortness of breath, no abdominal pain and all other systems were reviewed and are negative.       Objective:     /70 (BP Location: Right arm, Patient Position: Sitting, BP Cuff Size: Large adult)   Pulse 88   Temp 36.9 °C (98.4 °F) (Temporal)   Ht 1.676 m (5' 6\")   Wt 84.8 kg (187 lb)   SpO2 98%  Body mass index is 30.18 kg/m².   Physical Exam:  Constitutional: Alert, no " distress.  Skin: Warm, dry, good turgor, no rashes in visible areas.  Eye: Equal, round and reactive, conjunctiva clear, lids normal.  ENMT: Lips without lesions, good dentition, oropharynx clear.  Neck: Trachea midline, no masses.   Lymph: No cervical or supraclavicular lymphadenopathy  Respiratory: Unlabored respiratory effort, lungs appear clear.  Psych: Alert and oriented x3, normal affect and mood.        Assessment and Plan:   The following treatment plan was discussed    1. Acute left eye pain  Acute, new onset condition.  Eye examination good.  Given the intermittent eye pain could be secondary to dry eye syndrome.  Advised to follow-up with one of his eye specialist.  Would take a nonsteroidal such as Aleve.  Continue eyedrops provided as directed.  Symptoms are better today.  Follow-up at the ER with any worsening symptoms.    2. MILA (generalized anxiety disorder)  Chronic condition.  Stable.  Doing well today.  See no reason to continue an SSRI.    3. Abdominal bloating  Chronic condition.  Unknown cause.  Treat him with Meprazole to take for the next week or so.  Also ordered ultrasound so contact imaging.  Referred lastly to GI for evaluation.    - US-ABDOMEN COMPLETE SURVEY; Future  - Referral to Gastroenterology  - omeprazole (PRILOSEC) 20 MG delayed-release capsule; Take 1 Capsule by mouth every day for 30 days. 1/2 hour prior to same meal.  Dispense: 30 Capsule; Refill: 2    4. Preventative health care  Order labs.  Fast 8 hours.  - CBC WITH DIFFERENTIAL; Future  - Comp Metabolic Panel; Future  - Lipid Profile; Future  - HEMOGLOBIN A1C; Future  - TSH WITH REFLEX TO FT4; Future    5. Encounter for hepatitis C screening test for low risk patient  Hep C viral antibody ordered.  Low risk.  - HEP C VIRUS ANTIBODY; Future         Followup: Return in about 6 months (around 10/24/2023), or if symptoms worsen or fail to improve.    Please note that this dictation was created using voice recognition software.  I have made every reasonable attempt to correct obvious errors, but I expect that there are errors of grammar and possibly content that I did not discover before finalizing the note.

## 2023-04-24 NOTE — ASSESSMENT & PLAN NOTE
This is a pleasant 38-year-old male who is here today to follow-up on his health.  Complains of having left eye pain over the past few days.  Eyes slightly red.  Has been seen in the past by 2 eye specialist.  Nothing was noted.  He did have a full examination.  Was provided some antibiotic eyedrops that he has been using.  Pain was worse yesterday where he could not open his eye.  Denies any current issues such as tearing.  No photophobia.  No discharge.

## 2023-05-01 ENCOUNTER — HOSPITAL ENCOUNTER (OUTPATIENT)
Dept: LAB | Facility: MEDICAL CENTER | Age: 38
End: 2023-05-01
Attending: PHYSICIAN ASSISTANT
Payer: COMMERCIAL

## 2023-05-01 ENCOUNTER — HOSPITAL ENCOUNTER (OUTPATIENT)
Dept: RADIOLOGY | Facility: MEDICAL CENTER | Age: 38
End: 2023-05-01
Attending: PHYSICIAN ASSISTANT
Payer: COMMERCIAL

## 2023-05-01 DIAGNOSIS — R14.0 ABDOMINAL BLOATING: ICD-10-CM

## 2023-05-01 DIAGNOSIS — Z00.00 PREVENTATIVE HEALTH CARE: ICD-10-CM

## 2023-05-01 DIAGNOSIS — Z11.59 ENCOUNTER FOR HEPATITIS C SCREENING TEST FOR LOW RISK PATIENT: ICD-10-CM

## 2023-05-01 LAB
ALBUMIN SERPL BCP-MCNC: 4.7 G/DL (ref 3.2–4.9)
ALBUMIN/GLOB SERPL: 1.9 G/DL
ALP SERPL-CCNC: 86 U/L (ref 30–99)
ALT SERPL-CCNC: 59 U/L (ref 2–50)
ANION GAP SERPL CALC-SCNC: 14 MMOL/L (ref 7–16)
AST SERPL-CCNC: 32 U/L (ref 12–45)
BASOPHILS # BLD AUTO: 0.5 % (ref 0–1.8)
BASOPHILS # BLD: 0.04 K/UL (ref 0–0.12)
BILIRUB SERPL-MCNC: 0.5 MG/DL (ref 0.1–1.5)
BUN SERPL-MCNC: 21 MG/DL (ref 8–22)
CALCIUM ALBUM COR SERPL-MCNC: 8.5 MG/DL (ref 8.5–10.5)
CALCIUM SERPL-MCNC: 9.1 MG/DL (ref 8.5–10.5)
CHLORIDE SERPL-SCNC: 103 MMOL/L (ref 96–112)
CHOLEST SERPL-MCNC: 165 MG/DL (ref 100–199)
CO2 SERPL-SCNC: 24 MMOL/L (ref 20–33)
CREAT SERPL-MCNC: 1.06 MG/DL (ref 0.5–1.4)
EOSINOPHIL # BLD AUTO: 0.16 K/UL (ref 0–0.51)
EOSINOPHIL NFR BLD: 2 % (ref 0–6.9)
ERYTHROCYTE [DISTWIDTH] IN BLOOD BY AUTOMATED COUNT: 42.2 FL (ref 35.9–50)
EST. AVERAGE GLUCOSE BLD GHB EST-MCNC: 114 MG/DL
FASTING STATUS PATIENT QL REPORTED: NORMAL
GFR SERPLBLD CREATININE-BSD FMLA CKD-EPI: 93 ML/MIN/1.73 M 2
GLOBULIN SER CALC-MCNC: 2.5 G/DL (ref 1.9–3.5)
GLUCOSE SERPL-MCNC: 93 MG/DL (ref 65–99)
HBA1C MFR BLD: 5.6 % (ref 4–5.6)
HCT VFR BLD AUTO: 48.1 % (ref 42–52)
HCV AB SER QL: NORMAL
HDLC SERPL-MCNC: 31 MG/DL
HGB BLD-MCNC: 16.1 G/DL (ref 14–18)
IMM GRANULOCYTES # BLD AUTO: 0.01 K/UL (ref 0–0.11)
IMM GRANULOCYTES NFR BLD AUTO: 0.1 % (ref 0–0.9)
LDLC SERPL CALC-MCNC: 91 MG/DL
LYMPHOCYTES # BLD AUTO: 2.96 K/UL (ref 1–4.8)
LYMPHOCYTES NFR BLD: 37.9 % (ref 22–41)
MCH RBC QN AUTO: 30.8 PG (ref 27–33)
MCHC RBC AUTO-ENTMCNC: 33.5 G/DL (ref 33.7–35.3)
MCV RBC AUTO: 92.1 FL (ref 81.4–97.8)
MONOCYTES # BLD AUTO: 0.6 K/UL (ref 0–0.85)
MONOCYTES NFR BLD AUTO: 7.7 % (ref 0–13.4)
NEUTROPHILS # BLD AUTO: 4.04 K/UL (ref 1.82–7.42)
NEUTROPHILS NFR BLD: 51.8 % (ref 44–72)
NRBC # BLD AUTO: 0 K/UL
NRBC BLD-RTO: 0 /100 WBC
PLATELET # BLD AUTO: 229 K/UL (ref 164–446)
PMV BLD AUTO: 12.4 FL (ref 9–12.9)
POTASSIUM SERPL-SCNC: 4.3 MMOL/L (ref 3.6–5.5)
PROT SERPL-MCNC: 7.2 G/DL (ref 6–8.2)
RBC # BLD AUTO: 5.22 M/UL (ref 4.7–6.1)
SODIUM SERPL-SCNC: 141 MMOL/L (ref 135–145)
TRIGL SERPL-MCNC: 217 MG/DL (ref 0–149)
TSH SERPL DL<=0.005 MIU/L-ACNC: 1.38 UIU/ML (ref 0.38–5.33)
WBC # BLD AUTO: 7.8 K/UL (ref 4.8–10.8)

## 2023-05-01 PROCEDURE — 86803 HEPATITIS C AB TEST: CPT

## 2023-05-01 PROCEDURE — 76700 US EXAM ABDOM COMPLETE: CPT

## 2023-05-01 PROCEDURE — 80053 COMPREHEN METABOLIC PANEL: CPT

## 2023-05-01 PROCEDURE — 84443 ASSAY THYROID STIM HORMONE: CPT

## 2023-05-01 PROCEDURE — 80061 LIPID PANEL: CPT

## 2023-05-01 PROCEDURE — 83036 HEMOGLOBIN GLYCOSYLATED A1C: CPT

## 2023-05-01 PROCEDURE — 36415 COLL VENOUS BLD VENIPUNCTURE: CPT

## 2023-05-01 PROCEDURE — 85025 COMPLETE CBC W/AUTO DIFF WBC: CPT

## 2023-06-14 ENCOUNTER — APPOINTMENT (OUTPATIENT)
Dept: RADIOLOGY | Facility: MEDICAL CENTER | Age: 38
End: 2023-06-14
Attending: EMERGENCY MEDICINE
Payer: COMMERCIAL

## 2023-06-14 ENCOUNTER — HOSPITAL ENCOUNTER (OUTPATIENT)
Facility: MEDICAL CENTER | Age: 38
End: 2023-06-15
Attending: EMERGENCY MEDICINE | Admitting: HOSPITALIST
Payer: COMMERCIAL

## 2023-06-14 DIAGNOSIS — R19.7 BLOODY DIARRHEA: ICD-10-CM

## 2023-06-14 DIAGNOSIS — R10.9 ABDOMINAL PAIN, UNSPECIFIED ABDOMINAL LOCATION: ICD-10-CM

## 2023-06-14 DIAGNOSIS — K59.00 CONSTIPATION, UNSPECIFIED CONSTIPATION TYPE: ICD-10-CM

## 2023-06-14 PROBLEM — Z80.0 FAMILY HISTORY OF COLON CANCER: Status: ACTIVE | Noted: 2023-06-14

## 2023-06-14 LAB
ABO GROUP BLD: NORMAL
ALBUMIN SERPL BCP-MCNC: 4.9 G/DL (ref 3.2–4.9)
ALBUMIN/GLOB SERPL: 1.8 G/DL
ALP SERPL-CCNC: 82 U/L (ref 30–99)
ALT SERPL-CCNC: 66 U/L (ref 2–50)
ANION GAP SERPL CALC-SCNC: 15 MMOL/L (ref 7–16)
APPEARANCE UR: CLEAR
AST SERPL-CCNC: 31 U/L (ref 12–45)
BASOPHILS # BLD AUTO: 0.8 % (ref 0–1.8)
BASOPHILS # BLD: 0.05 K/UL (ref 0–0.12)
BILIRUB SERPL-MCNC: 0.5 MG/DL (ref 0.1–1.5)
BILIRUB UR QL STRIP.AUTO: NEGATIVE
BLD GP AB SCN SERPL QL: NORMAL
BUN SERPL-MCNC: 16 MG/DL (ref 8–22)
CALCIUM ALBUM COR SERPL-MCNC: 8.8 MG/DL (ref 8.5–10.5)
CALCIUM SERPL-MCNC: 9.5 MG/DL (ref 8.5–10.5)
CHLORIDE SERPL-SCNC: 102 MMOL/L (ref 96–112)
CO2 SERPL-SCNC: 22 MMOL/L (ref 20–33)
COLOR UR: YELLOW
CREAT SERPL-MCNC: 0.98 MG/DL (ref 0.5–1.4)
EOSINOPHIL # BLD AUTO: 0.08 K/UL (ref 0–0.51)
EOSINOPHIL NFR BLD: 1.3 % (ref 0–6.9)
ERYTHROCYTE [DISTWIDTH] IN BLOOD BY AUTOMATED COUNT: 41.1 FL (ref 35.9–50)
GFR SERPLBLD CREATININE-BSD FMLA CKD-EPI: 102 ML/MIN/1.73 M 2
GLOBULIN SER CALC-MCNC: 2.7 G/DL (ref 1.9–3.5)
GLUCOSE SERPL-MCNC: 94 MG/DL (ref 65–99)
GLUCOSE UR STRIP.AUTO-MCNC: NEGATIVE MG/DL
HCT VFR BLD AUTO: 47.7 % (ref 42–52)
HEMOCCULT STL QL: POSITIVE
HGB BLD-MCNC: 16.9 G/DL (ref 14–18)
IMM GRANULOCYTES # BLD AUTO: 0.02 K/UL (ref 0–0.11)
IMM GRANULOCYTES NFR BLD AUTO: 0.3 % (ref 0–0.9)
KETONES UR STRIP.AUTO-MCNC: ABNORMAL MG/DL
LACTATE SERPL-SCNC: 1.3 MMOL/L (ref 0.5–2)
LACTOFERRIN STL QL IA: NEGATIVE
LEUKOCYTE ESTERASE UR QL STRIP.AUTO: NEGATIVE
LIPASE SERPL-CCNC: 35 U/L (ref 11–82)
LYMPHOCYTES # BLD AUTO: 1.9 K/UL (ref 1–4.8)
LYMPHOCYTES NFR BLD: 31.5 % (ref 22–41)
MAGNESIUM SERPL-MCNC: 2 MG/DL (ref 1.5–2.5)
MCH RBC QN AUTO: 31.4 PG (ref 27–33)
MCHC RBC AUTO-ENTMCNC: 35.4 G/DL (ref 32.3–36.5)
MCV RBC AUTO: 88.7 FL (ref 81.4–97.8)
MICRO URNS: ABNORMAL
MONOCYTES # BLD AUTO: 0.51 K/UL (ref 0–0.85)
MONOCYTES NFR BLD AUTO: 8.5 % (ref 0–13.4)
NEUTROPHILS # BLD AUTO: 3.47 K/UL (ref 1.82–7.42)
NEUTROPHILS NFR BLD: 57.6 % (ref 44–72)
NITRITE UR QL STRIP.AUTO: NEGATIVE
NRBC # BLD AUTO: 0 K/UL
NRBC BLD-RTO: 0 /100 WBC (ref 0–0.2)
PH UR STRIP.AUTO: 6 [PH] (ref 5–8)
PLATELET # BLD AUTO: 225 K/UL (ref 164–446)
PMV BLD AUTO: 12 FL (ref 9–12.9)
POTASSIUM SERPL-SCNC: 3.6 MMOL/L (ref 3.6–5.5)
PROT SERPL-MCNC: 7.6 G/DL (ref 6–8.2)
PROT UR QL STRIP: NEGATIVE MG/DL
RBC # BLD AUTO: 5.38 M/UL (ref 4.7–6.1)
RBC UR QL AUTO: NEGATIVE
RH BLD: NORMAL
SODIUM SERPL-SCNC: 139 MMOL/L (ref 135–145)
SP GR UR STRIP.AUTO: 1.03
UROBILINOGEN UR STRIP.AUTO-MCNC: 0.2 MG/DL
WBC # BLD AUTO: 6 K/UL (ref 4.8–10.8)

## 2023-06-14 PROCEDURE — 99222 1ST HOSP IP/OBS MODERATE 55: CPT | Performed by: SPECIALIST

## 2023-06-14 PROCEDURE — G0378 HOSPITAL OBSERVATION PER HR: HCPCS

## 2023-06-14 PROCEDURE — 700105 HCHG RX REV CODE 258: Performed by: EMERGENCY MEDICINE

## 2023-06-14 PROCEDURE — 81003 URINALYSIS AUTO W/O SCOPE: CPT

## 2023-06-14 PROCEDURE — 74174 CTA ABD&PLVS W/CONTRAST: CPT

## 2023-06-14 PROCEDURE — 96374 THER/PROPH/DIAG INJ IV PUSH: CPT

## 2023-06-14 PROCEDURE — 85025 COMPLETE CBC W/AUTO DIFF WBC: CPT

## 2023-06-14 PROCEDURE — 87045 FECES CULTURE AEROBIC BACT: CPT

## 2023-06-14 PROCEDURE — 86901 BLOOD TYPING SEROLOGIC RH(D): CPT

## 2023-06-14 PROCEDURE — 83690 ASSAY OF LIPASE: CPT

## 2023-06-14 PROCEDURE — 700117 HCHG RX CONTRAST REV CODE 255: Performed by: EMERGENCY MEDICINE

## 2023-06-14 PROCEDURE — 86850 RBC ANTIBODY SCREEN: CPT

## 2023-06-14 PROCEDURE — 82272 OCCULT BLD FECES 1-3 TESTS: CPT

## 2023-06-14 PROCEDURE — 700111 HCHG RX REV CODE 636 W/ 250 OVERRIDE (IP): Performed by: EMERGENCY MEDICINE

## 2023-06-14 PROCEDURE — 80053 COMPREHEN METABOLIC PANEL: CPT

## 2023-06-14 PROCEDURE — 36415 COLL VENOUS BLD VENIPUNCTURE: CPT

## 2023-06-14 PROCEDURE — 700101 HCHG RX REV CODE 250: Performed by: SPECIALIST

## 2023-06-14 PROCEDURE — 99223 1ST HOSP IP/OBS HIGH 75: CPT

## 2023-06-14 PROCEDURE — 99285 EMERGENCY DEPT VISIT HI MDM: CPT

## 2023-06-14 PROCEDURE — 87899 AGENT NOS ASSAY W/OPTIC: CPT | Mod: 91

## 2023-06-14 PROCEDURE — 83630 LACTOFERRIN FECAL (QUAL): CPT

## 2023-06-14 PROCEDURE — 83605 ASSAY OF LACTIC ACID: CPT

## 2023-06-14 PROCEDURE — 86900 BLOOD TYPING SEROLOGIC ABO: CPT

## 2023-06-14 PROCEDURE — 83735 ASSAY OF MAGNESIUM: CPT

## 2023-06-14 RX ORDER — OMEPRAZOLE 20 MG/1
20 CAPSULE, DELAYED RELEASE ORAL DAILY
Status: DISCONTINUED | OUTPATIENT
Start: 2023-06-15 | End: 2023-06-15 | Stop reason: HOSPADM

## 2023-06-14 RX ORDER — HYDRALAZINE HYDROCHLORIDE 20 MG/ML
10 INJECTION INTRAMUSCULAR; INTRAVENOUS EVERY 4 HOURS PRN
Status: DISCONTINUED | OUTPATIENT
Start: 2023-06-14 | End: 2023-06-15 | Stop reason: HOSPADM

## 2023-06-14 RX ORDER — OXYCODONE HYDROCHLORIDE 5 MG/1
5 TABLET ORAL EVERY 4 HOURS PRN
Status: DISCONTINUED | OUTPATIENT
Start: 2023-06-14 | End: 2023-06-15 | Stop reason: HOSPADM

## 2023-06-14 RX ORDER — ONDANSETRON 2 MG/ML
4 INJECTION INTRAMUSCULAR; INTRAVENOUS EVERY 4 HOURS PRN
Status: DISCONTINUED | OUTPATIENT
Start: 2023-06-14 | End: 2023-06-15 | Stop reason: HOSPADM

## 2023-06-14 RX ORDER — ONDANSETRON 2 MG/ML
4 INJECTION INTRAMUSCULAR; INTRAVENOUS ONCE
Status: COMPLETED | OUTPATIENT
Start: 2023-06-14 | End: 2023-06-14

## 2023-06-14 RX ORDER — BISACODYL 10 MG
10 SUPPOSITORY, RECTAL RECTAL
Status: DISCONTINUED | OUTPATIENT
Start: 2023-06-14 | End: 2023-06-15 | Stop reason: HOSPADM

## 2023-06-14 RX ORDER — POLYETHYLENE GLYCOL 3350 17 G/17G
1 POWDER, FOR SOLUTION ORAL
Status: DISCONTINUED | OUTPATIENT
Start: 2023-06-14 | End: 2023-06-15 | Stop reason: HOSPADM

## 2023-06-14 RX ORDER — AMOXICILLIN 250 MG
2 CAPSULE ORAL 2 TIMES DAILY
Status: DISCONTINUED | OUTPATIENT
Start: 2023-06-14 | End: 2023-06-15 | Stop reason: HOSPADM

## 2023-06-14 RX ORDER — SODIUM CHLORIDE 9 MG/ML
1000 INJECTION, SOLUTION INTRAVENOUS ONCE
Status: COMPLETED | OUTPATIENT
Start: 2023-06-14 | End: 2023-06-14

## 2023-06-14 RX ORDER — OMEPRAZOLE 20 MG/1
20 CAPSULE, DELAYED RELEASE ORAL DAILY
COMMUNITY

## 2023-06-14 RX ADMIN — IOHEXOL 100 ML: 350 INJECTION, SOLUTION INTRAVENOUS at 16:45

## 2023-06-14 RX ADMIN — SODIUM CHLORIDE 1000 ML: 9 INJECTION, SOLUTION INTRAVENOUS at 14:53

## 2023-06-14 RX ADMIN — POLYETHYLENE GLYCOL-3350 AND ELECTROLYTES 4 L: 236; 6.74; 5.86; 2.97; 22.74 POWDER, FOR SOLUTION ORAL at 19:54

## 2023-06-14 RX ADMIN — ONDANSETRON 4 MG: 2 INJECTION INTRAMUSCULAR; INTRAVENOUS at 14:54

## 2023-06-14 ASSESSMENT — FIBROSIS 4 INDEX
FIB4 SCORE: 0.67
FIB4 SCORE: 0.63

## 2023-06-14 ASSESSMENT — ENCOUNTER SYMPTOMS
DIZZINESS: 0
VOMITING: 0
PALPITATIONS: 0
HEARTBURN: 0
CHILLS: 0
NAUSEA: 0
ABDOMINAL PAIN: 1
COUGH: 0
HEADACHES: 0
DIARRHEA: 1
SHORTNESS OF BREATH: 0
FEVER: 0

## 2023-06-14 ASSESSMENT — LIFESTYLE VARIABLES
EVER FELT BAD OR GUILTY ABOUT YOUR DRINKING: NO
AVERAGE NUMBER OF DAYS PER WEEK YOU HAVE A DRINK CONTAINING ALCOHOL: 0
ALCOHOL_USE: YES
TOTAL SCORE: 0
TOTAL SCORE: 0
HAVE YOU EVER FELT YOU SHOULD CUT DOWN ON YOUR DRINKING: NO
EVER HAD A DRINK FIRST THING IN THE MORNING TO STEADY YOUR NERVES TO GET RID OF A HANGOVER: NO
CONSUMPTION TOTAL: NEGATIVE
HAVE PEOPLE ANNOYED YOU BY CRITICIZING YOUR DRINKING: NO
TOTAL SCORE: 0
HOW MANY TIMES IN THE PAST YEAR HAVE YOU HAD 5 OR MORE DRINKS IN A DAY: 0
DOES PATIENT WANT TO STOP DRINKING: CANNOT ASSESS
ON A TYPICAL DAY WHEN YOU DRINK ALCOHOL HOW MANY DRINKS DO YOU HAVE: 0

## 2023-06-14 ASSESSMENT — COGNITIVE AND FUNCTIONAL STATUS - GENERAL
SUGGESTED CMS G CODE MODIFIER DAILY ACTIVITY: CH
MOBILITY SCORE: 24
DAILY ACTIVITIY SCORE: 24
SUGGESTED CMS G CODE MODIFIER MOBILITY: CH

## 2023-06-14 ASSESSMENT — PATIENT HEALTH QUESTIONNAIRE - PHQ9
1. LITTLE INTEREST OR PLEASURE IN DOING THINGS: NOT AT ALL
2. FEELING DOWN, DEPRESSED, IRRITABLE, OR HOPELESS: NOT AT ALL
SUM OF ALL RESPONSES TO PHQ9 QUESTIONS 1 AND 2: 0

## 2023-06-14 NOTE — ED NOTES
Pt medicated per MAR. Labs collected and sent. Pt  ambulated to restroom to attempt for stool sample.

## 2023-06-14 NOTE — ED NOTES
Stool sample collected and waked to lab by ED tech. Pt reports decrease in nausea, reports no other needs at this time.

## 2023-06-14 NOTE — ED TRIAGE NOTES
"Chief Complaint   Patient presents with    Abdominal Pain     Pt c/o abdominal pain since Monday, reports nausea since last night. Describes pain as sharp stabbing\"    Diarrhea     Since yesterday \"had to go every 30 minutes\". Today just noticed bloody stool. -thinners.      Educated on triage process. Instructed to notify staff for any worsening symptoms. Denies any recent travel. Denies exposure to known covid positive patients. Denies any respiratory symptoms.  Vitals:    06/14/23 1335   BP: 131/86   Pulse: 69   Resp: 16   Temp: 37 °C (98.6 °F)   SpO2: 99%     "

## 2023-06-14 NOTE — ED PROVIDER NOTES
"  ER Provider Note    Scribed for Nithya Loyola M.d. by Deja Miller. 6/14/2023  2:07 PM    Primary Care Provider: Luis Pradhan P.A.-C.    CHIEF COMPLAINT  Chief Complaint   Patient presents with    Abdominal Pain     Pt c/o abdominal pain since Monday, reports nausea since last night. Describes pain as sharp stabbing\"    Diarrhea     Since yesterday \"had to go every 30 minutes\". Today just noticed bloody stool. -thinners.      EXTERNAL RECORDS REVIEWED  Care everywhere The patient was seen by his primary care physician in 4/2023 for eye pain.     HPI/ROS  LIMITATION TO HISTORY   Select: : None  OUTSIDE HISTORIAN(S):  None    Suresh Ho is a 38 y.o. male who presents to the ED complaining of abdominal pain onset two days ago. He reports associated diarrhea which began the same day as the abdominal pain. The patient describes the diarrhea as liquid with some stool. He notes that there has been bright red blood in his diarrhea with some mucous. He denies any black stool. He states that he has been defecating roughly every 30 minutes through the day and the night.   He states that the diarrhea was the first symptom, which led to abdominal pain a few hours after onset abdominal pain. He states that the abdominal pain is intermittent and seems to resolve a bit after bowel movement. The patient reports that yesterday he had nausea and headache. He took milk of magnesia which increased his bowel movement output but the consistency of the stool was the same. Today, the patient notes that he has not been eating but he has been drinking water. He reports feeling light headed and weak. The patient states that he has been urinating normally. He denies any recent travel outside of the country, antibiotic use, bad food exposure, or drinking any unclean water. The patient notes that he is sometimes sensitive to certain seasonings, but everything he has eaten recently his family members have also eaten and they " "are not experiencing symptoms.   The patient has a history of blood in the stool and intermittent bloody diarrhea since 2011 but he has never had a colonoscopy. The patient has a family history of colon cancer at an early age on both his maternal and paternal sides. He states that he had an ultrasound of his abdomen done a few years ago done which was reassuring. His primary care physician told him that the bleeding is not concerning. The patient states that he was previously an alcoholic and did not eat a healthy diet but for the past year he has stopped drinking and improved his diet. This decreased the frequency of his diarrhea and he has not noted blood in his stool since then prior to this episode.     PAST MEDICAL HISTORY  No past medical history on file.    SURGICAL HISTORY  Past Surgical History:   Procedure Laterality Date    VENTRAL HERNIA REPAIR  9/12/2018    Procedure: VENTRAL HERNIA REPAIR - W/MESH;  Surgeon: Katherine Brennan M.D.;  Location: SURGERY [facility];  Service: General    APPENDECTOMY  2010       FAMILY HISTORY  Family History   Problem Relation Age of Onset    Cancer Maternal Grandfather         Prostate       SOCIAL HISTORY   reports that he quit smoking about 5 years ago. His smoking use included cigars and cigarettes. He uses smokeless tobacco. He reports that he does not currently use alcohol. He reports that he does not use drugs.    CURRENT MEDICATIONS  Current Discharge Medication List        CONTINUE these medications which have NOT CHANGED    Details   omeprazole (PRILOSEC) 20 MG delayed-release capsule Take 20 mg by mouth every day.      multivitamin Tab Take 1 Tablet by mouth every day.             ALLERGIES  Patient has no known allergies.    PHYSICAL EXAM  /86   Pulse 69   Temp 37 °C (98.6 °F) (Temporal)   Resp 16   Ht 1.626 m (5' 4\")   Wt 82.8 kg (182 lb 8.7 oz)   SpO2 99%   BMI 31.33 kg/m²   Constitutional:  Well developed, well nourished; No acute distress "   HENT: Normocephalic, Atraumatic, Bilateral external ears normal, moist mucous membranes  Eyes: PERRL, EOMI, Conjunctiva normal, No discharge.   Neck: Normal range of motion, supple, nontender  Lymphatic: No lymphadenopathy noted.   Cardiovascular: Normal heart rate, Normal rhythm, No murmurs, rubs or gallops   Thorax & Lungs: CTA=bilaterally;  No respiratory distress,  No wheezing rales, or rhonchi; No chest tenderness. No crepitus or subQ air  Abdomen: No guarding no rebound, no masses, no pulsatile mass, no distention. Tenderness to perussion in the.left upper quadrant, mid epigastrium, right upper quadrant, and right mid abdomen. Tenderness to palpation in right mid abdomen and right upper quadrant   Rectal: No external hemorrhoids, small amount of dried, dark, red blood around anus, no obvious fissures, trace stool on finger exam, trace stool was positive guaiac.   Skin: Warm, Dry, No erythema, No rash.   Back: No tenderness, No CVA tenderness.   Extremities: 2+ dp and pt pulses bilateral LEs;  Nontender; no pretibial edema  Neurologic: Alert & oriented x 4, clear speech  Psychiatric: appropriate, normal affect    DIAGNOSTIC STUDIES    Labs:   Results for orders placed or performed during the hospital encounter of 06/14/23   CBC WITH DIFFERENTIAL   Result Value Ref Range    WBC 6.0 4.8 - 10.8 K/uL    RBC 5.38 4.70 - 6.10 M/uL    Hemoglobin 16.9 14.0 - 18.0 g/dL    Hematocrit 47.7 42.0 - 52.0 %    MCV 88.7 81.4 - 97.8 fL    MCH 31.4 27.0 - 33.0 pg    MCHC 35.4 32.3 - 36.5 g/dL    RDW 41.1 35.9 - 50.0 fL    Platelet Count 225 164 - 446 K/uL    MPV 12.0 9.0 - 12.9 fL    Neutrophils-Polys 57.60 44.00 - 72.00 %    Lymphocytes 31.50 22.00 - 41.00 %    Monocytes 8.50 0.00 - 13.40 %    Eosinophils 1.30 0.00 - 6.90 %    Basophils 0.80 0.00 - 1.80 %    Immature Granulocytes 0.30 0.00 - 0.90 %    Nucleated RBC 0.00 0.00 - 0.20 /100 WBC    Neutrophils (Absolute) 3.47 1.82 - 7.42 K/uL    Lymphs (Absolute) 1.90 1.00 - 4.80  K/uL    Monos (Absolute) 0.51 0.00 - 0.85 K/uL    Eos (Absolute) 0.08 0.00 - 0.51 K/uL    Baso (Absolute) 0.05 0.00 - 0.12 K/uL    Immature Granulocytes (abs) 0.02 0.00 - 0.11 K/uL    NRBC (Absolute) 0.00 K/uL   COMP METABOLIC PANEL   Result Value Ref Range    Sodium 139 135 - 145 mmol/L    Potassium 3.6 3.6 - 5.5 mmol/L    Chloride 102 96 - 112 mmol/L    Co2 22 20 - 33 mmol/L    Anion Gap 15.0 7.0 - 16.0    Glucose 94 65 - 99 mg/dL    Bun 16 8 - 22 mg/dL    Creatinine 0.98 0.50 - 1.40 mg/dL    Calcium 9.5 8.5 - 10.5 mg/dL    AST(SGOT) 31 12 - 45 U/L    ALT(SGPT) 66 (H) 2 - 50 U/L    Alkaline Phosphatase 82 30 - 99 U/L    Total Bilirubin 0.5 0.1 - 1.5 mg/dL    Albumin 4.9 3.2 - 4.9 g/dL    Total Protein 7.6 6.0 - 8.2 g/dL    Globulin 2.7 1.9 - 3.5 g/dL    A-G Ratio 1.8 g/dL   LIPASE   Result Value Ref Range    Lipase 35 11 - 82 U/L   URINALYSIS    Specimen: Urine   Result Value Ref Range    Color Yellow     Character Clear     Specific Gravity 1.032 <1.035    Ph 6.0 5.0 - 8.0    Glucose Negative Negative mg/dL    Ketones Trace (A) Negative mg/dL    Protein Negative Negative mg/dL    Bilirubin Negative Negative    Urobilinogen, Urine 0.2 Negative    Nitrite Negative Negative    Leukocyte Esterase Negative Negative    Occult Blood Negative Negative    Micro Urine Req see below    COD (ADULT)   Result Value Ref Range    ABO Grouping Only B     Rh Grouping Only POS     Antibody Screen-Cod NEG    FECAL LACTOFERRIN QUALITATIVE   Result Value Ref Range    Lactoferrin, Fecal by RUSS Negative Negative   OCCULT BLOOD STOOL   Result Value Ref Range    Occult Blood Feces Positive (A) Negative   LACTIC ACID   Result Value Ref Range    Lactic Acid 1.3 0.5 - 2.0 mmol/L   MAGNESIUM   Result Value Ref Range    Magnesium 2.0 1.5 - 2.5 mg/dL   CORRECTED CALCIUM   Result Value Ref Range    Correct Calcium 8.8 8.5 - 10.5 mg/dL   ESTIMATED GFR   Result Value Ref Range    GFR (CKD-EPI) 102 >60 mL/min/1.73 m 2       Radiology:   The  attending emergency physician has independently interpreted the diagnostic imaging associated with this visit and am waiting the final reading from the radiologist.     Preliminary interpretation is a follows: ER MD is reviewed the patient's CT scan.  No obvious fat stranding and no obvious obstruction.    Radiologist interpretation:   CTA ABDOMEN PELVIS W & W/O POST PROCESS   Final Result      1.  Fatty liver.   2.  Suture rows at the cecum compatible with prior appendectomy.   3.  A few rare diverticula are seen along the sigmoid colon. No evidence of acute diverticulitis.   4.  No evidence of acute intraluminal GI extravasation.          COURSE & MEDICAL DECISION MAKING     ED Observation Status? Yes; I am placing the patient in to an observation status due to a diagnostic uncertainty as well as therapeutic intensity. Patient placed in observation status at 2:41 PM, 6/14/2023.     Observation plan is as follows: Rectal exam and imaging     Upon Reevaluation, the patient's condition has: not improved; and will be escalated to hospitalization.    Patient discharged from ED Observation status at 6:31 PM (Time) 6/14/2023 (Date).     INITIAL ASSESSMENT, COURSE AND PLAN  Care Narrative: Patient presents to the ER complaining of 3 days of bloody diarrhea and abdominal pain.  He said the symptoms began as diarrhea.  A few hours later he started having abdominal pain the abdominal pain is intermittent.  He reports every 30 minutes episodes of bloody diarrhea.  Today he says the blood seems to be getting a little bit less.  Patient reports intermittent bloody diarrhea since 2011.  He has a strong family history of colon cancer, with multiple family members diagnosed with colon cancer at an early age.  Patient reports that typically his episodes of bloody diarrhea and abdominal pain usually last about a day.  This episode is lasting much longer.  This is what is concerning to him.  Stool studies were obtained and were sent  to lab.  He is negative for lactoferrin but positive for blood.  Stool culture is pending.  No foreign travel.  No antibiotics in last 3 months.  No ill contacts at home.  Low suspicion for C. difficile.  Patient went to the CT scanner and had a CT angio of the abdomen.  There is no active extravasation.  No evidence of colitis or diverticulitis.  No obstruction or perforation.  Labs are fairly unremarkable.  Patient looks overall quite well.  His vitals are normal and stable.  Hemoglobin is stable.  No need for transfusion.  However, I spoke with gastroenterology on-call.  Dr. Walter says that she has an opening tomorrow for a scope.  She agrees that patient is going to need a colonoscopy sooner than later given his symptoms and his strong family history of early colon cancer.  Since she has open space tomorrow, she would like the patient admitted to the hospital overnight.  She will order bowel prep.  She will do the colonoscopy tomorrow and then likely discharge the patient after colonoscopy.  Patient is amenable to hospital admission for colonoscopy.  He will bowel prep tonight.  I spoke with the hospitalist ROMEO on-call and she will kindly evaluate the patient for hospitalization in preparation for colonoscopy with Dr. Walter.    2:41 PM - Patient seen and examined at bedside. Discussed plan of care, including rectal exam and advanced imaging. Patient agrees to the plan of care. The patient will be resuscitated with 1L NS IV and medicated with Zofran 4 mg for nausea. Ordered for lab work and imaging to evaluate his symptoms.     3:47 PM Per nursing staff, the patients nausea is feeling improved.     3:51 PM Rectal exam performed.     5:36 PM I was informed by the lab that there was an error in running the stool study resulting in a delay of care.     5:51 PM Recheck: Patient re-evaluated at beside. Discussed patient's condition and treatment plan. The patient expressed concern about his condition stating that he  is worried about how much this episode is than his previous episodes. He is also still concerned for his family history of colon cancer. Patient's lab and radiology results discussed. His hemoglobin levels are normal. I informed him that there are no signs of colon obstruction, but I recommended that he see his GI doctor in clinic and have a colonoscopy done. I discussed plan for discharge and follow up as outlined below, but I told him that I would discuss his case with GI and see if they would like him to be admitted. The patient is stable for discharge at this time and will return for any new or worsening symptoms. Patient verbalizes understanding and support with my plan for discharge.     5:59 PM Paged GI.      6:18 PM Spoke with NANCY Abbasi, about the patient's condition. She states that she can scope him tomorrow and would like him to be admitted.  She will order bowel prep for tonight.    6:31 PM - I informed the patient of my plan to admit today given Dr. Walter's recommendation and plan to scope tomorrow. Patient verbalizes understanding and support with my plan for admission.      6:55 PM - Consult with the hospitalist ROMEO who agrees to admit the patient.     HYDRATION: Based on the patient's presentation of Acute Diarrhea the patient was given IV fluids. IV Hydration was used because oral hydration was not adequate alone. Upon recheck following hydration, the patient was feeling improved.    ADDITIONAL PROBLEM LIST  Problem #1: Bloody diarrhea  Problem #2: Abdominal pain    DISPOSITION AND DISCUSSIONS  I have discussed management of the patient with the following physicians and STEPHAN's:  Dr. Walter GI, Dr. Kevin, Hospitalist     Discussion of management with other \[facility]\"" or appropriate source(s): Pharmacy Beena pharmacist  .  Beena ordered the bowel prep as Dr. Walter was having computer issues and could not order it on her end.    Escalation of care considered, and ultimately not performed: IV fluids and  diagnostic imaging.    Barriers to care at this time, including but not limited to:  None noted .     Decision tools and prescription drugs considered including, but not limited to: Pain Medications patient denies significant abdominal pain and therefore no pain medications administered. .    FINAL DIANGOSIS  1. Abdominal pain, unspecified abdominal location Acute   2. Bloody diarrhea Acute       DISPOSITION:  Patient will be hospitalized by Dr. Kevin in guarded condition.     This dictation has been created using voice recognition software. The accuracy of the dictation is limited by the abilities of the software. I expect there may be some errors of grammar and possibly content. I made every attempt to manually correct the errors within my dictation. However, errors related to voice recognition software may still exist and should be interpreted within the appropriate context.     I, Deja Miller (Scribe), am scribing for, and in the presence of, Nithya Loyola M.D..    Electronically signed by: Deja Miller (Scribe), 6/14/2023    INithya M.D. personally performed the services described in this documentation, as scribed by Deja Miller in my presence, and it is both accurate and complete.    The note accurately reflects work and decisions made by me.  Nithya Loyola M.D.  6/14/2023  8:51 PM

## 2023-06-15 ENCOUNTER — ANESTHESIA (OUTPATIENT)
Dept: SURGERY | Facility: MEDICAL CENTER | Age: 38
End: 2023-06-15
Payer: COMMERCIAL

## 2023-06-15 ENCOUNTER — ANESTHESIA EVENT (OUTPATIENT)
Dept: SURGERY | Facility: MEDICAL CENTER | Age: 38
End: 2023-06-15
Payer: COMMERCIAL

## 2023-06-15 VITALS
OXYGEN SATURATION: 98 % | RESPIRATION RATE: 18 BRPM | DIASTOLIC BLOOD PRESSURE: 57 MMHG | HEART RATE: 62 BPM | TEMPERATURE: 97.7 F | BODY MASS INDEX: 30.86 KG/M2 | WEIGHT: 180.78 LBS | SYSTOLIC BLOOD PRESSURE: 112 MMHG | HEIGHT: 64 IN

## 2023-06-15 LAB
ALBUMIN SERPL BCP-MCNC: 4.7 G/DL (ref 3.2–4.9)
ALBUMIN/GLOB SERPL: 1.9 G/DL
ALP SERPL-CCNC: 79 U/L (ref 30–99)
ALT SERPL-CCNC: 64 U/L (ref 2–50)
ANION GAP SERPL CALC-SCNC: 13 MMOL/L (ref 7–16)
AST SERPL-CCNC: 33 U/L (ref 12–45)
BILIRUB SERPL-MCNC: 0.6 MG/DL (ref 0.1–1.5)
BUN SERPL-MCNC: 15 MG/DL (ref 8–22)
CALCIUM ALBUM COR SERPL-MCNC: 8.6 MG/DL (ref 8.5–10.5)
CALCIUM SERPL-MCNC: 9.2 MG/DL (ref 8.5–10.5)
CHLORIDE SERPL-SCNC: 102 MMOL/L (ref 96–112)
CO2 SERPL-SCNC: 25 MMOL/L (ref 20–33)
CREAT SERPL-MCNC: 1.01 MG/DL (ref 0.5–1.4)
E COLI SXT1+2 STL IA: NORMAL
ERYTHROCYTE [DISTWIDTH] IN BLOOD BY AUTOMATED COUNT: 42.5 FL (ref 35.9–50)
GFR SERPLBLD CREATININE-BSD FMLA CKD-EPI: 98 ML/MIN/1.73 M 2
GLOBULIN SER CALC-MCNC: 2.5 G/DL (ref 1.9–3.5)
GLUCOSE SERPL-MCNC: 95 MG/DL (ref 65–99)
HCT VFR BLD AUTO: 49.8 % (ref 42–52)
HGB BLD-MCNC: 16.6 G/DL (ref 14–18)
MCH RBC QN AUTO: 30.7 PG (ref 27–33)
MCHC RBC AUTO-ENTMCNC: 33.3 G/DL (ref 32.3–36.5)
MCV RBC AUTO: 92.1 FL (ref 81.4–97.8)
PATHOLOGY CONSULT NOTE: NORMAL
PLATELET # BLD AUTO: 234 K/UL (ref 164–446)
PMV BLD AUTO: 12 FL (ref 9–12.9)
POTASSIUM SERPL-SCNC: 3.6 MMOL/L (ref 3.6–5.5)
PROT SERPL-MCNC: 7.2 G/DL (ref 6–8.2)
RBC # BLD AUTO: 5.41 M/UL (ref 4.7–6.1)
SIGNIFICANT IND 70042: NORMAL
SITE SITE: NORMAL
SODIUM SERPL-SCNC: 140 MMOL/L (ref 135–145)
SOURCE SOURCE: NORMAL
WBC # BLD AUTO: 6.2 K/UL (ref 4.8–10.8)

## 2023-06-15 PROCEDURE — 36415 COLL VENOUS BLD VENIPUNCTURE: CPT

## 2023-06-15 PROCEDURE — 45380 COLONOSCOPY AND BIOPSY: CPT | Performed by: SPECIALIST

## 2023-06-15 PROCEDURE — 80053 COMPREHEN METABOLIC PANEL: CPT

## 2023-06-15 PROCEDURE — A9270 NON-COVERED ITEM OR SERVICE: HCPCS

## 2023-06-15 PROCEDURE — 160002 HCHG RECOVERY MINUTES (STAT): Performed by: SPECIALIST

## 2023-06-15 PROCEDURE — 700102 HCHG RX REV CODE 250 W/ 637 OVERRIDE(OP)

## 2023-06-15 PROCEDURE — 88305 TISSUE EXAM BY PATHOLOGIST: CPT

## 2023-06-15 PROCEDURE — 160048 HCHG OR STATISTICAL LEVEL 1-5: Performed by: SPECIALIST

## 2023-06-15 PROCEDURE — 700101 HCHG RX REV CODE 250: Performed by: ANESTHESIOLOGY

## 2023-06-15 PROCEDURE — 160203 HCHG ENDO MINUTES - 1ST 30 MINS LEVEL 4: Performed by: SPECIALIST

## 2023-06-15 PROCEDURE — 99239 HOSP IP/OBS DSCHRG MGMT >30: CPT | Performed by: INTERNAL MEDICINE

## 2023-06-15 PROCEDURE — G0378 HOSPITAL OBSERVATION PER HR: HCPCS

## 2023-06-15 PROCEDURE — 700105 HCHG RX REV CODE 258: Performed by: ANESTHESIOLOGY

## 2023-06-15 PROCEDURE — 160035 HCHG PACU - 1ST 60 MINS PHASE I: Performed by: SPECIALIST

## 2023-06-15 PROCEDURE — 00811 ANES LWR INTST NDSC NOS: CPT | Performed by: ANESTHESIOLOGY

## 2023-06-15 PROCEDURE — 700111 HCHG RX REV CODE 636 W/ 250 OVERRIDE (IP): Performed by: ANESTHESIOLOGY

## 2023-06-15 PROCEDURE — 85027 COMPLETE CBC AUTOMATED: CPT

## 2023-06-15 PROCEDURE — 160009 HCHG ANES TIME/MIN: Performed by: SPECIALIST

## 2023-06-15 RX ORDER — POLYETHYLENE GLYCOL 3350 17 G/17G
17 POWDER, FOR SOLUTION ORAL
Refills: 3 | COMMUNITY
Start: 2023-06-15 | End: 2023-06-21

## 2023-06-15 RX ORDER — HYDROMORPHONE HYDROCHLORIDE 1 MG/ML
0.1 INJECTION, SOLUTION INTRAMUSCULAR; INTRAVENOUS; SUBCUTANEOUS
Status: DISCONTINUED | OUTPATIENT
Start: 2023-06-15 | End: 2023-06-15 | Stop reason: HOSPADM

## 2023-06-15 RX ORDER — OXYCODONE HCL 5 MG/5 ML
5 SOLUTION, ORAL ORAL
Status: DISCONTINUED | OUTPATIENT
Start: 2023-06-15 | End: 2023-06-15 | Stop reason: HOSPADM

## 2023-06-15 RX ORDER — SODIUM CHLORIDE, SODIUM LACTATE, POTASSIUM CHLORIDE, CALCIUM CHLORIDE 600; 310; 30; 20 MG/100ML; MG/100ML; MG/100ML; MG/100ML
INJECTION, SOLUTION INTRAVENOUS
Status: DISCONTINUED | OUTPATIENT
Start: 2023-06-15 | End: 2023-06-15 | Stop reason: SURG

## 2023-06-15 RX ORDER — EPHEDRINE SULFATE 50 MG/ML
5 INJECTION, SOLUTION INTRAVENOUS
Status: DISCONTINUED | OUTPATIENT
Start: 2023-06-15 | End: 2023-06-15 | Stop reason: HOSPADM

## 2023-06-15 RX ORDER — SODIUM CHLORIDE, SODIUM LACTATE, POTASSIUM CHLORIDE, CALCIUM CHLORIDE 600; 310; 30; 20 MG/100ML; MG/100ML; MG/100ML; MG/100ML
INJECTION, SOLUTION INTRAVENOUS CONTINUOUS
Status: DISCONTINUED | OUTPATIENT
Start: 2023-06-15 | End: 2023-06-15 | Stop reason: HOSPADM

## 2023-06-15 RX ORDER — AMOXICILLIN 250 MG
2 CAPSULE ORAL 2 TIMES DAILY PRN
COMMUNITY
Start: 2023-06-15 | End: 2023-06-21

## 2023-06-15 RX ORDER — MIDAZOLAM HYDROCHLORIDE 1 MG/ML
INJECTION INTRAMUSCULAR; INTRAVENOUS PRN
Status: DISCONTINUED | OUTPATIENT
Start: 2023-06-15 | End: 2023-06-15 | Stop reason: SURG

## 2023-06-15 RX ORDER — DIPHENHYDRAMINE HYDROCHLORIDE 50 MG/ML
12.5 INJECTION INTRAMUSCULAR; INTRAVENOUS
Status: DISCONTINUED | OUTPATIENT
Start: 2023-06-15 | End: 2023-06-15 | Stop reason: HOSPADM

## 2023-06-15 RX ORDER — LIDOCAINE HYDROCHLORIDE 20 MG/ML
INJECTION, SOLUTION EPIDURAL; INFILTRATION; INTRACAUDAL; PERINEURAL PRN
Status: DISCONTINUED | OUTPATIENT
Start: 2023-06-15 | End: 2023-06-15 | Stop reason: SURG

## 2023-06-15 RX ORDER — OXYCODONE HCL 5 MG/5 ML
10 SOLUTION, ORAL ORAL
Status: DISCONTINUED | OUTPATIENT
Start: 2023-06-15 | End: 2023-06-15 | Stop reason: HOSPADM

## 2023-06-15 RX ORDER — HYDROMORPHONE HYDROCHLORIDE 1 MG/ML
0.2 INJECTION, SOLUTION INTRAMUSCULAR; INTRAVENOUS; SUBCUTANEOUS
Status: DISCONTINUED | OUTPATIENT
Start: 2023-06-15 | End: 2023-06-15 | Stop reason: HOSPADM

## 2023-06-15 RX ORDER — ONDANSETRON 2 MG/ML
4 INJECTION INTRAMUSCULAR; INTRAVENOUS
Status: DISCONTINUED | OUTPATIENT
Start: 2023-06-15 | End: 2023-06-15 | Stop reason: HOSPADM

## 2023-06-15 RX ORDER — HYDROMORPHONE HYDROCHLORIDE 1 MG/ML
0.4 INJECTION, SOLUTION INTRAMUSCULAR; INTRAVENOUS; SUBCUTANEOUS
Status: DISCONTINUED | OUTPATIENT
Start: 2023-06-15 | End: 2023-06-15 | Stop reason: HOSPADM

## 2023-06-15 RX ORDER — HALOPERIDOL 5 MG/ML
1 INJECTION INTRAMUSCULAR
Status: DISCONTINUED | OUTPATIENT
Start: 2023-06-15 | End: 2023-06-15 | Stop reason: HOSPADM

## 2023-06-15 RX ORDER — HYDRALAZINE HYDROCHLORIDE 20 MG/ML
5 INJECTION INTRAMUSCULAR; INTRAVENOUS
Status: DISCONTINUED | OUTPATIENT
Start: 2023-06-15 | End: 2023-06-15 | Stop reason: HOSPADM

## 2023-06-15 RX ADMIN — PROPOFOL 100 MG: 10 INJECTION, EMULSION INTRAVENOUS at 09:37

## 2023-06-15 RX ADMIN — SODIUM CHLORIDE, POTASSIUM CHLORIDE, SODIUM LACTATE AND CALCIUM CHLORIDE: 600; 310; 30; 20 INJECTION, SOLUTION INTRAVENOUS at 09:35

## 2023-06-15 RX ADMIN — LIDOCAINE HYDROCHLORIDE 60 MG: 20 INJECTION, SOLUTION EPIDURAL; INFILTRATION; INTRACAUDAL at 09:37

## 2023-06-15 RX ADMIN — OMEPRAZOLE 20 MG: 20 CAPSULE, DELAYED RELEASE ORAL at 05:37

## 2023-06-15 RX ADMIN — PROPOFOL 100 MCG/KG/MIN: 10 INJECTION, EMULSION INTRAVENOUS at 09:38

## 2023-06-15 RX ADMIN — MIDAZOLAM 2 MG: 1 INJECTION, SOLUTION INTRAMUSCULAR; INTRAVENOUS at 09:37

## 2023-06-15 ASSESSMENT — PAIN SCALES - GENERAL: PAIN_LEVEL: 0

## 2023-06-15 ASSESSMENT — PAIN DESCRIPTION - PAIN TYPE: TYPE: SURGICAL PAIN

## 2023-06-15 NOTE — ASSESSMENT & PLAN NOTE
-Patient has been having bloody diarrhea off-and-on for past year. However, it has been some time since last episode and this time it is accompanied by abd pain.  -Patient has a strong family history of colon cancer  -He has never had a colonoscopy  -ERP discussed the case with GI, will take patient for colonoscopy in a.m. due to strong family history   -H&H is normal  -Occult stool positive for blood

## 2023-06-15 NOTE — PROCEDURES
OPERATIVE REPORT        PATIENT: Suresh Ho  1985      PREOPERATIVE DIAGNOSIS/INDICATION: Hematochezia, diarrhea    POSTOPERATIVE DIAGNOSES: Small internal hemorrhoids    PROCEDURE: COLONOSCOPY    PHYSICIAN: Christy Walter MD    CONSENT:  OBTAINED. The risks, benefits and alternatives of the procedure were discussed in details. The risks include and are not limited to bleeding, infection, perforation, missed lesions, and sedations risks (cardiopulmonary compromise and allergic reaction to medications).    ANESTHESIA:  Per anesthesiologist.    LOCATION: [facility]    DESCRIPTION:  The patient presented to the procedure room.  After routine checkup was performed, patient was brought into endoscopy suite.  Patient was placed on his left lateral decubitus position.  Patient was sedated by anesthesia. Vital signs were monitored throughout procedure.  Oxygenation support was provided throughout procedure. Digital rectal examination was performed.  Then, a colonoscope was inserted into patient's anus, advanced to the terminal ileum under direct visualization.  Once the site was reached and examined, the colonoscope was withdrawn and procedure was terminated. Withdrawal time was at least 6 minutes to ensure adequate examination.    The patient tolerated the procedure well.  There were no immediate complications.    The quality of the bowel preparation was  adequate.      FINDINGS:  Normal colonoscopy - biopsies taken for microscopic colitis  2.  Terminal ileum - normal  3.  Internal hemorrhoids, very small    RECOMMENDATIONS:  Follow-up on biopsies  [facility]  Repeat colonoscopy in 5 years with GI group  in [facility] due to family history    This note has been transcribed with digital voice recognition software and although it has been reviewed may contain grammatical or word errors

## 2023-06-15 NOTE — ANESTHESIA PREPROCEDURE EVALUATION
Case: [id number] Date/Time: 06/15/23 1115    Procedure: COLONOSCOPY    Location: [facility] ROOM 26 / SURGERY SAME DAY [facility]    Surgeons: Christy Walter M.D.          Relevant Problems   No relevant active problems       Physical Exam    Airway   Mallampati: II  TM distance: >3 FB  Neck ROM: full       Cardiovascular   Rhythm: regular  Rate: normal     Dental - normal exam           Pulmonary - normal exam     Abdominal    Neurological - normal exam                 Anesthesia Plan    ASA 1       Plan - MAC               Induction: intravenous      Pertinent diagnostic labs and testing reviewed    Informed Consent:    Anesthetic plan and risks discussed with patient.    Use of blood products discussed with: patient whom consented to blood products.

## 2023-06-15 NOTE — ED NOTES
Bedside report given to Charlene SINGER. Pt resting comfortably and aware of POC with call light available and in reach. Pt on RA. Fall precautions in place and appropriate for pt. Pt reports no needs at this time. Appropriate equipment in room.

## 2023-06-15 NOTE — ANESTHESIA POSTPROCEDURE EVALUATION
Patient: Suresh Ho    Procedure Summary     Date: 06/15/23 Room / Location: [facility] ROOM 26 / SURGERY SAME DAY [facility]    Anesthesia Start: 0935 Anesthesia Stop: 0954    Procedures:       COLONOSCOPY (Anus)      COLONOSCOPY, WITH BIOPSY (Anus) Diagnosis: (normal exam)    Surgeons: Christy Walter M.D. Responsible Provider: Jhonathan Anders M.D.    Anesthesia Type: MAC ASA Status: 1          Final Anesthesia Type: MAC  Last vitals  BP   Blood Pressure: 126/74    Temp   36.1 °C (97 °F)    Pulse   (!) 59   Resp   18    SpO2   99 %      Anesthesia Post Evaluation    Patient location during evaluation: PACU  Patient participation: complete - patient participated  Level of consciousness: awake  Pain score: 0    Airway patency: patent  Anesthetic complications: no  Cardiovascular status: adequate  Respiratory status: acceptable and face mask  Hydration status: stable    PONV: controlled          No notable events documented.     Nurse Pain Score: 1 (NPRS)

## 2023-06-15 NOTE — H&P
"Hospital Medicine History & Physical Note    Date of Service  6/14/2023    Primary Care Physician  Luis Pradhan P.A.-C.    Consultants  GI    Specialist Names: Dr Walter    Code Status  Full Code    Chief Complaint  Chief Complaint   Patient presents with    Abdominal Pain     Pt c/o abdominal pain since Monday, reports nausea since last night. Describes pain as sharp stabbing\"    Diarrhea     Since yesterday \"had to go every 30 minutes\". Today just noticed bloody stool. -thinners.        History of Presenting Illness  Suresh Ho is a 38 y.o. male who presented 6/14/2023 with abdominal pain and bloody diarrhea.  He reports associated diarrhea and frequency with bowel movements which began on Monday. The patient describes the diarrhea as liquid with some stool. He notes that there has been bright red blood in his diarrhea with some mucous. He denies any black stool. He states that he has been voiding roughly every 30 minutes through the day and the night.   He states that the diarrhea was the first symptom, which led to abdominal pain. He states that the abdominal pain would move when he had bowel movements and has been intermittent. The patient reports that yesterday he had nausea and headache. He took milk of magnesia which increased his bowel movement output but the consistency of the stool was the same. Today, the patient notes that he has not been eating but he has been drinking water. He reports feeling light headed and weak. The patient states that he has been urinating normally. He denies any recent travel outside of the country, antibiotic use, bad food exposure, or drinking any unclean water. The patient notes that he is sometimes sensitive to certain seasonings, but everything he has eaten recently his family members have also eaten and they are not experiencing symptoms.   The patient has a history of blood in the stool but he has never had a colonoscopy. The patient has a family history of " colon cancer on both his maternal and paternal sides. He states that he had an ultrasound done which was reassuring. His primary care physician told him that the bleeding is not concerning. The patient states that he was previously an alcoholic and did not eat a healthy diet but for the past year he has stopped drinking and improved his diet. This decreased the frequency of his diarrhea and he has not noted blood in his stool since then prior to this episode.     In the ED, his vitals and labs are stable including hemoglobin. GI was consulted by ERP and recommended taking the patient to colonoscopy tomorrow morning and hopeful for discharge immediately following.       I discussed the plan of care with patient and Dr Kevin .    Review of Systems  Review of Systems   Constitutional:  Negative for chills, fever and malaise/fatigue.   Respiratory:  Negative for cough and shortness of breath.    Cardiovascular:  Negative for chest pain, palpitations and leg swelling.   Gastrointestinal:  Positive for abdominal pain and diarrhea. Negative for heartburn, nausea and vomiting.   Genitourinary:  Negative for dysuria, frequency and urgency.   Neurological:  Negative for dizziness and headaches.   All other systems reviewed and are negative.      Past Medical History   has no past medical history on file.    Surgical History   has a past surgical history that includes appendectomy (2010) and ventral hernia repair (9/12/2018).     Family History  family history includes Cancer in his maternal grandfather.   Family history reviewed with patient. There is family history that is pertinent to the chief complaint.     Social History   reports that he quit smoking about 5 years ago. His smoking use included cigars and cigarettes. He uses smokeless tobacco. He reports that he does not currently use alcohol. He reports that he does not use drugs.    Allergies  No Known Allergies    Medications  None       Physical Exam  Temp:  [36.3 °C  (97.3 °F)-37 °C (98.6 °F)] 36.3 °C (97.3 °F)  Pulse:  [60-69] 60  Resp:  [16-20] 18  BP: (120-140)/(75-86) 122/78  SpO2:  [97 %-100 %] 100 %  Blood Pressure: 130/75   Temperature: 37 °C (98.6 °F)   Pulse: 65   Respiration: 16   Pulse Oximetry: 99 %       Physical Exam  Vitals and nursing note reviewed.   Constitutional:       Appearance: Normal appearance. He is not ill-appearing.   HENT:      Head: Normocephalic and atraumatic.      Jaw: There is normal jaw occlusion.      Right Ear: Hearing normal.      Left Ear: Hearing normal.      Nose: Nose normal.      Mouth/Throat:      Lips: Pink.      Mouth: Mucous membranes are moist.   Eyes:      Extraocular Movements: Extraocular movements intact.      Conjunctiva/sclera: Conjunctivae normal.      Pupils: Pupils are equal, round, and reactive to light.   Neck:      Vascular: No carotid bruit.   Cardiovascular:      Rate and Rhythm: Normal rate and regular rhythm.      Pulses: Normal pulses.      Heart sounds: Normal heart sounds, S1 normal and S2 normal.   Pulmonary:      Effort: Pulmonary effort is normal.      Breath sounds: Normal breath sounds and air entry. No stridor.   Abdominal:      Palpations: Abdomen is soft.      Tenderness: There is abdominal tenderness in the right upper quadrant.   Musculoskeletal:      Cervical back: Normal range of motion and neck supple.      Right lower leg: No edema.      Left lower leg: No edema.   Skin:     General: Skin is warm and dry.      Capillary Refill: Capillary refill takes less than 2 seconds.   Neurological:      General: No focal deficit present.      Mental Status: He is alert and oriented to person, place, and time. Mental status is at baseline.      Sensory: Sensation is intact.      Motor: Motor function is intact.   Psychiatric:         Attention and Perception: Attention and perception normal.         Mood and Affect: Mood and affect normal.         Speech: Speech normal.         Behavior: Behavior normal.  Behavior is cooperative.         Laboratory:  Recent Labs     06/14/23  1456   WBC 6.0   RBC 5.38   HEMOGLOBIN 16.9   HEMATOCRIT 47.7   MCV 88.7   MCH 31.4   MCHC 35.4   RDW 41.1   PLATELETCT 225   MPV 12.0     Recent Labs     06/14/23  1456   SODIUM 139   POTASSIUM 3.6   CHLORIDE 102   CO2 22   GLUCOSE 94   BUN 16   CREATININE 0.98   CALCIUM 9.5     Recent Labs     06/14/23  1456   ALTSGPT 66*   ASTSGOT 31   ALKPHOSPHAT 82   TBILIRUBIN 0.5   LIPASE 35   GLUCOSE 94         No results for input(s): NTPROBNP in the last 72 hours.      No results for input(s): TROPONINT in the last 72 hours.    Imaging:  CTA ABDOMEN PELVIS W & W/O POST PROCESS   Final Result      1.  Fatty liver.   2.  Suture rows at the cecum compatible with prior appendectomy.   3.  A few rare diverticula are seen along the sigmoid colon. No evidence of acute diverticulitis.   4.  No evidence of acute intraluminal GI extravasation.          no X-Ray or EKG requiring interpretation    Assessment/Plan:  Justification for Admission Status  I anticipate this patient is appropriate for observation status at this time because colonoscopy in a.m. and then discharge    Patient will need a Med/Surg bed on MEDICAL service .  The need is secondary to colonoscopy in a.m.    * Bloody diarrhea- (present on admission)  Assessment & Plan  -Patient has been having bloody diarrhea off-and-on for past year. However, it has been some time since last episode and this time it is accompanied by abd pain.  -Patient has a strong family history of colon cancer  -He has never had a colonoscopy  -ERP discussed the case with GI, will take patient for colonoscopy in a.m. due to strong family history   -H&H is normal  -Occult stool positive for blood    MILA (generalized anxiety disorder)- (present on admission)  Assessment & Plan  -History of  -Not on any home medications    Family history of colon cancer  Assessment & Plan  -Patient has an extensive family history of colon  cancer        VTE prophylaxis: pharmacologic prophylaxis contraindicated due to GI bleed

## 2023-06-15 NOTE — DISCHARGE SUMMARY
"Discharge Summary    CHIEF COMPLAINT ON ADMISSION  Chief Complaint   Patient presents with    Abdominal Pain     Pt c/o abdominal pain since Monday, reports nausea since last night. Describes pain as sharp stabbing\"    Diarrhea     Since yesterday \"had to go every 30 minutes\". Today just noticed bloody stool. -thinners.        Reason for Admission  Abd Pain     Admission Date  6/14/2023    CODE STATUS  Full Code    HPI & HOSPITAL COURSE  This is a 38 y.o. male here with Abdominal Pain (Pt c/o abdominal pain since Monday, reports nausea since last night. Describes pain as sharp stabbing\") and Diarrhea (Since yesterday \"had to go every 30 minutes\". Today just noticed bloody stool. -thinners. )  Please review Dr. Alexsander Kevin M.D. notes for further details of history of present illness, past medical/social/family histories, allergies and medications. Please review Dr. Walter, GI consultation notes.  He is obese. He complained of abdominal discomfort and bloody diarrhea.   At the ED< he is afebrile, hemodynamically stable.  Hb actually normal. GI consulted.  He underwent colonoscopy. Other than small internal hemorrhoids, normal colonoscopy. Biopsy pending. He improved and is no longer having GI bleeding. He tolerated food. He was cleared for discharge.      At discharge date, Suresh Ho afebrile and hemodynamically stable.  Suresh Ho wanted to be discharged today.    Discharge Physical Exam  General/Constitutional: No acute distress. Obese  Head: Normocephalic, atraumatic  ENT: Oral mucosa is moist. No obvious pharyngeal exudates  Eyes: Pink conjunctiva, no scleral icterus  Neck: Supple, no lymphadenopathy  Cardiovascular: Normal rate and regular rhythm. S1,2 noted. No murmurs, gallops or rubs.  Pulmonary: Clear to auscultation bilaterally. No wheezes, rales or rhonchi.  Abdominal: Soft, nontender, not distended, bowel sounds normoactive. No guarding or peritoneal signs.  Musculoskeletal: No " tenderness to palpation of chest wall.  Neurologic: Alert and oriented. Grossly nonfocal, moving all extremities.  Genitourinary: No gross hematuria  Skin: No obvious rash.  Psychiatric: Pleasant, cooperative.  Vitals Reviewed  Labs Reviewed  Imaging reviewed  Nursing notes reviewed      Imaging  CTA ABDOMEN PELVIS W & W/O POST PROCESS   Final Result      1.  Fatty liver.   2.  Suture rows at the cecum compatible with prior appendectomy.   3.  A few rare diverticula are seen along the sigmoid colon. No evidence of acute diverticulitis.   4.  No evidence of acute intraluminal GI extravasation.                Therefore, he is discharged in good and stable condition to home with close outpatient follow-up.      Discharge Date  6/15/2023    FOLLOW UP ITEMS POST DISCHARGE  Biopsy results    DISCHARGE DIAGNOSES  Principal Problem:    Bloody diarrhea (POA: Yes)  Active Problems:    MILA (generalized anxiety disorder) (POA: Yes)    Family history of colon cancer (POA: Unknown)  Resolved Problems:    * No resolved hospital problems. *      FOLLOW UP  Future Appointments   Date Time Provider Department Center   6/21/2023  9:40 AM Luis Pradhan P.A.-C. PA [facility]   7/17/2023  3:40 PM uLis Pradhan P.A.-C. PA [facility] Walter M.D.  [address] [place] [address] [id number] [phone]    Call in 1 week(s)    Follow upwith Luis Pradhan P.A.-C. In 1 week Follow up with gastroenterologist in 1 week for post colonoscopy visit and further w/u biopsy results NURSING provide resources/pamphlets for Obesity. Monitor for GI bleed    MEDICATIONS ON DISCHARGE     Medication List        START taking these medications        Instructions   polyethylene glycol/lytes 17 g Pack  Commonly known as: MIRALAX   Take 1 Packet by mouth 1 time a day as needed (if sennosides and docusate ineffective after 24 hours).  Dose: 17 g     senna-docusate 8.6-50 MG Tabs  Commonly known as: PERICOLACE or SENOKOT S   Take  2 Tablets by mouth 2 times a day as needed for Constipation.  Dose: 2 Tablet            CONTINUE taking these medications        Instructions   multivitamin Tabs   Take 1 Tablet by mouth every day.  Dose: 1 Tablet     omeprazole 20 MG delayed-release capsule  Commonly known as: PRILOSEC   Take 20 mg by mouth every day.  Dose: 20 mg              Allergies  No Known Allergies    DIET  Orders Placed This Encounter   Procedures    Diet Order Diet: Regular     Standing Status:   Standing     Number of Occurrences:   1     Order Specific Question:   Diet:     Answer:   Regular [1]       ACTIVITY  Avoid heavy lifting or strenuous activity      CONSULTATIONS  GI    PROCEDURES  See Dr. Walter's PROC NOTE    LABORATORY  Lab Results   Component Value Date    SODIUM 140 06/15/2023    POTASSIUM 3.6 06/15/2023    CHLORIDE 102 06/15/2023    CO2 25 06/15/2023    GLUCOSE 95 06/15/2023    BUN 15 06/15/2023    CREATININE 1.01 06/15/2023        Lab Results   Component Value Date    WBC 6.2 06/15/2023    HEMOGLOBIN 16.6 06/15/2023    HEMATOCRIT 49.8 06/15/2023    PLATELETCT 234 06/15/2023        Total time of the discharge process exceeds 35 minutes.

## 2023-06-15 NOTE — OR NURSING
0952 - Pt to PACU 7 from OR.  Bedside report from anesthesiologist and RN.  Attached to monitoring, VSS, breathing is calm and unlabored on 6L oxymask.     1002- Called and gave report to LUCRECIA Claire     1010- Pt awake, weaned to RA, no complaints of pain or nausea, tolerating sips of water, eating crackers. Called and updated pt's wife. Called and gave report to LUCRECIA Claire. Pt placed on transport.     1023- Pt transferred back to room with transport.

## 2023-06-15 NOTE — PROGRESS NOTES
4 Eyes Skin Assessment Completed by Souleymane RN and LUCRECIA Xavier.    Head WDL  Ears WDL  Nose WDL  Mouth WDL  Neck WDL  Breast/Chest WDL  Shoulder Blades WDL  Spine WDL  (R) Arm/Elbow/Hand WDL  (L) Arm/Elbow/Hand WDL  Abdomen WDL  Groin WDL  Scrotum/Coccyx/Buttocks WDL  (R) Leg WDL  (L) Leg WDL  (R) Heel/Foot/Toe WDL  (L) Heel/Foot/Toe WDL          Devices In Places Blood Pressure Cuff      Interventions In Place N/A    Possible Skin Injury No    Pictures Uploaded Into Epic N/A  Wound Consult Placed N/A  RN Wound Prevention Protocol Ordered No

## 2023-06-15 NOTE — CONSULTS
GASTROENTEROLOGY CONSULTATION    PATIENT NAME: Suresh Ho  : 1985  CSN: [id number]  MRN:  [id number]     CONSULTATION DATE:  2023    PRIMARY CARE PROVIDER:  Luis Pradhan P.A.-C.      REASON FOR CONSULT:  Rectal bleeding and diarrhea    HISTORY OF PRESENT ILLNESS:  Suresh Ho is a 38 y.o. male who presented 2023  bloody diarrhea.  He reports associated diarrhea and frequency with bowel movements which began on Monday. The patient describes the diarrhea as liquid with some stool. He notes that there has been bright red blood in his diarrhea with some mucous. His occurred first, then he had clots that were darker blood. He did not have melena. He states that he has been voiding roughly every 30 minutes through the day and the night, but when he had the clots passing, he had to go ever few minutes.   He states that the diarrhea was the first symptom, which led to abdominal pain. He states that the abdominal pain would move when he had bowel movements and has been intermittent. The patient reports that yesterday he had nausea and headache. He took milk of magnesia which increased his bowel movement output but the consistency of the stool was the same. Today, the patient notes that he has not been eating but he has been drinking water. He reports feeling light headed and weak. The patient has a significant family history of colon cancer both on mother and father side. He had a cousin that ws diagnosed at age 22.  He states he was told he was too young for a colonoscopy by primary care doctor. He saw him bc he has had blood in the stool before. He does not have rectal pain. He denies any recent travel outside of the country, antibiotic use, bad food exposure, or drinking any unclean water. The patient notes that he is sometimes sensitive to certain seasonings, but everything he has eaten recently his family members have also eaten and they are not experiencing symptoms. He gets  heartburn eating spicy foods.   He states that he had an ultrasound done which was reassuring. His primary care physician told him that the bleeding is not concerning. The patient states that he was previously an alcoholic and did not eat a healthy diet but for the past year he has stopped drinking and improved his diet. This decreased the frequency of his diarrhea and he has not noted blood in his stool since then prior to this episode.      His vitals and labs are stable including hemoglobin.        PAST MEDICAL HISTORY:  No past medical history on file.    PAST SURGICAL HISTORY:  Past Surgical History:   Procedure Laterality Date    VENTRAL HERNIA REPAIR  9/12/2018    Procedure: VENTRAL HERNIA REPAIR - W/MESH;  Surgeon: Katherine Brennan M.D.;  Location: SURGERY [facility];  Service: General    APPENDECTOMY  2010        CURRENT MEDS:  Current Facility-Administered Medications   Medication Dose Route Frequency Provider Last Rate Last Admin    senna-docusate (PERICOLACE or SENOKOT S) 8.6-50 MG per tablet 2 Tablet  2 Tablet Oral BID Jazmin Masterson, A.P.R.N.        And    polyethylene glycol/lytes (MIRALAX) PACKET 1 Packet  1 Packet Oral QDAY PRN Jazmin Masterson, A.P.R.N.        And    magnesium hydroxide (MILK OF MAGNESIA) suspension 30 mL  30 mL Oral QDAY PRN Jazmin Masterson, A.P.R.N.        And    bisacodyl (DULCOLAX) suppository 10 mg  10 mg Rectal QDAY PRN Jazmin Masterson, A.P.R.N.        hydrALAZINE (APRESOLINE) injection 10 mg  10 mg Intravenous Q4HRS PRN Jazmin Masterson, A.P.R.N.        [START ON 6/15/2023] omeprazole (PRILOSEC) capsule 20 mg  20 mg Oral DAILY Jazmin Masterson, A.P.R.N.            ALLERGIES:  No Known Allergies    SOCIAL HISTORY:  Social History     Socioeconomic History    Marital status:      Spouse name: Not on file    Number of children: Not on file    Years of education: Not on file    Highest education level: Not on file   Occupational History    Not on  "file   Tobacco Use    Smoking status: Former     Packs/day: 0.00     Types: Cigars, Cigarettes     Quit date: 2017     Years since quittin.8    Smokeless tobacco: Current    Tobacco comments:     once every six months; 2018 E Cig every 4-5 months   Vaping Use    Vaping Use: Never used   Substance and Sexual Activity    Alcohol use: Not Currently     Comment: 2 days/week, 6 drinks/day    Drug use: No    Sexual activity: Yes     Partners: Female     Comment: Domestic partnership, infant daughter.   Other Topics Concern    Not on file   Social History Narrative    ** Merged History Encounter **          Social Determinants of Health     Financial Resource Strain: Not on file   Food Insecurity: Not on file   Transportation Needs: Not on file   Physical Activity: Not on file   Stress: Not on file   Social Connections: Not on file   Intimate Partner Violence: Not on file   Housing Stability: Not on file       FAMILY HISTORY:  Family History   Problem Relation Age of Onset    Cancer Maternal Grandfather         Prostate        REVIEW OF SYSTEMS:  General ROS: Negative for - chills, fever, night sweats or weight loss.  HEENT ROS: Negative  Respiratory ROS: Negative for - cough or shortness of breath.  Cardiovascular ROS:  Negative for - chest pain or palpitations.  Gastrointestinal ROS: As per the history of present illness.  Genito-Urinary ROS: Negative  Musculoskeletal ROS: Negative.  Neurological ROS: Negative  Skin ROS: negative  Hematology ROS: negative  Endocrinology ROS: Negative        PHYSICAL EXAM:  VITALS: /78   Pulse 60   Temp 36.3 °C (97.3 °F) (Temporal)   Resp 18   Ht 1.626 m (5' 4\")   Wt 82 kg (180 lb 12.4 oz)   SpO2 100%   BMI 31.03 kg/m²   GEN:  Suresh Ho is a 38 y.o. male in no acute distress.  HEENT: Mucous membranes pink and moist.  Sclera anicteric.    NECK:    Neck supple without lymphadenopathy or thyromegaly.  LUNGS: Clear to auscultation " posteriorly.  HEART: Regular rate and rhythm. S1 and S2 normal. No murmurs, gallops  ABD:  +BS NT/ND -HSM  RECTAL: Not done at this time.  EXT:  Without cyanosis, deformity or pitting edema.  SKIN:  Pink, warm, dry.  NEURO: Grossly intact, A/OR.    LABS:  Recent Labs     06/14/23  1456   WBC 6.0   MCV 88.7     Recent Labs     06/14/23  1456   GLUCOSE 94   BUN 16   CO2 22     Lab Results   Component Value Date    INR 0.88 05/18/2018     No components found for: ALT, AST, GGT, ALKPHOS  No results found for: SHMUEL      @[facility]([id number])@     @[facility]([id number])@       IMPRESSION/PLAN:  BRBPR  Diarrhea  Family history of colon cancer  Colonoscopy tomorrow am  NPO after midnight  Golytely  Follow H/H       Christy Walter M.D.  Gastroenterology

## 2023-06-15 NOTE — ANESTHESIA TIME REPORT
Anesthesia Start and Stop Event Times     Date Time Event    6/15/2023 0930 Ready for Procedure     0935 Anesthesia Start     0954 Anesthesia Stop        Responsible Staff  06/15/23    Name Role Begin End    Jhonathan Anders M.D. Anesth 0935 0941        Overtime Reason:  no overtime (within assigned shift)    Comments:                                                       Not a problem at all.  Just wanted to make you aware  Thanks

## 2023-06-15 NOTE — ED NOTES
Pt transferred off unit by transport tech. No signs/symptoms of acute distress or pain noted at time of transfer.

## 2023-06-15 NOTE — CARE PLAN
The patient is Stable - Low risk of patient condition declining or worsening    Shift Goals  Clinical Goals: Prep for colonoscopy w/Shayan  Patient Goals: pain control      Problem: Pain - Standard  Goal: Alleviation of pain or a reduction in pain to the patient’s comfort goal  Outcome: Progressing     Problem: Knowledge Deficit - Standard  Goal: Patient and family/care givers will demonstrate understanding of plan of care, disease process/condition, diagnostic tests and medications  Outcome: Progressing     Problem: Psychosocial  Goal: Patient's level of anxiety will decrease  Outcome: Progressing  Goal: Patient's ability to verbalize feelings about condition will improve  Outcome: Progressing  Goal: Patient's ability to re-evaluate and adapt role responsibilities will improve  Outcome: Progressing  Goal: Patient and family will demonstrate ability to cope with life altering diagnosis and/or procedure  Outcome: Progressing  Goal: Spiritual and cultural needs incorporated into hospitalization  Outcome: Progressing     Problem: Fluid Volume  Goal: Fluid volume balance will be maintained  Outcome: Progressing     Problem: Nutrition  Goal: Patient's nutritional and fluid intake will be adequate or improve  Outcome: Progressing  Goal: Enteral nutrition will be maintained or improve  Outcome: Progressing  Goal: Enteral nutrition will be maintained or improve  Outcome: Progressing     Problem: Bowel Elimination  Goal: Establish and maintain regular bowel function  Outcome: Progressing     Problem: Self Care  Goal: Patient will have the ability to perform ADLs independently or with assistance (bathe, groom, dress, toilet and feed)  Outcome: Progressing     Problem: Infection - Standard  Goal: Patient will remain free from infection  Outcome: Progressing

## 2023-06-16 LAB
BACTERIA STL CULT: NORMAL
C JEJUNI+C COLI AG STL QL: NORMAL
E COLI SXT1+2 STL IA: NORMAL
SIGNIFICANT IND 70042: NORMAL
SITE SITE: NORMAL
SOURCE SOURCE: NORMAL

## 2023-06-21 ENCOUNTER — OFFICE VISIT (OUTPATIENT)
Dept: MEDICAL GROUP | Facility: MEDICAL CENTER | Age: 38
End: 2023-06-21
Payer: COMMERCIAL

## 2023-06-21 VITALS
OXYGEN SATURATION: 100 % | DIASTOLIC BLOOD PRESSURE: 80 MMHG | HEIGHT: 64 IN | WEIGHT: 181 LBS | HEART RATE: 59 BPM | BODY MASS INDEX: 30.9 KG/M2 | SYSTOLIC BLOOD PRESSURE: 120 MMHG | TEMPERATURE: 97.4 F

## 2023-06-21 DIAGNOSIS — K62.5 RECTAL BLEEDING: ICD-10-CM

## 2023-06-21 DIAGNOSIS — F41.1 GAD (GENERALIZED ANXIETY DISORDER): ICD-10-CM

## 2023-06-21 PROBLEM — R19.7 BLOODY DIARRHEA: Status: RESOLVED | Noted: 2023-06-14 | Resolved: 2023-06-21

## 2023-06-21 PROCEDURE — 99214 OFFICE O/P EST MOD 30 MIN: CPT | Performed by: PHYSICIAN ASSISTANT

## 2023-06-21 PROCEDURE — 3079F DIAST BP 80-89 MM HG: CPT | Performed by: PHYSICIAN ASSISTANT

## 2023-06-21 PROCEDURE — 3074F SYST BP LT 130 MM HG: CPT | Performed by: PHYSICIAN ASSISTANT

## 2023-06-21 ASSESSMENT — FIBROSIS 4 INDEX: FIB4 SCORE: 0.65

## 2023-06-21 NOTE — PROGRESS NOTES
"Subjective:   Suresh Ho is a 38 y.o. male here today for rectal bleeding and anxiety.    Rectal bleeding  This is a pleasant 38-year-old male who was seen on the 14th at the ER for rectal bleeding.  Next day had a colonoscopy.  Noted for hemorrhoids otherwise unremarkable colonoscopy.  Pathology report negative.  Has to follow-up with GI.  Tries his best to limit triggers for hemorrhoids.  He will need FMLA for the episode of missing work.  He plans to return to work tomorrow on the 26th.      MILA (generalized anxiety disorder)  Doing well with his anxiety.  He is also requesting FMLA to be completed for his intermittently for anxiety.  No concern currently with his stuttering.       Current medicines (including changes today)  Current Outpatient Medications   Medication Sig Dispense Refill    omeprazole (PRILOSEC) 20 MG delayed-release capsule Take 20 mg by mouth every day.      multivitamin Tab Take 1 Tablet by mouth every day.       No current facility-administered medications for this visit.     He  has no past medical history on file.    Social History and Family History were reviewed and updated.    ROS   No chest pain, no shortness of breath, no abdominal pain and all other systems were reviewed and are negative.       Objective:     /80 (BP Location: Right arm, Patient Position: Sitting, BP Cuff Size: Adult)   Pulse (!) 59   Temp 36.3 °C (97.4 °F) (Temporal)   Ht 1.626 m (5' 4\")   Wt 82.1 kg (181 lb)   SpO2 100%  Body mass index is 31.07 kg/m².   Physical Exam:  Constitutional: Alert, no distress.  Skin: Warm, dry, good turgor, no rashes in visible areas.  Eye: Equal, round and reactive, conjunctiva clear, lids normal.  ENMT: Lips without lesions, good dentition, oropharynx clear.  Neck: Trachea midline, no masses.   Respiratory: Unlabored respiratory effort.  Psych: Alert and oriented x3, normal affect and mood.        Assessment and Plan:   The following treatment plan was " discussed    1. Rectal bleeding  Acute, new onset condition.  Symptoms have been intermittent for quite some time.  Symptoms associated with hemorrhoids.  Reviewed colonoscopy results.  Follow-up with GI.  Letter written to return to work tomorrow.  Await [facility] for absence. He will return to work on the 26th.    2. MILA (generalized anxiety disorder)  Chronic condition.  Stable.  Advised to send me the [facility] for intermittent leave for anxiety.  Will update the [facility] for to continue his intermittent leave for anxiety.         Followup: Return if symptoms worsen or fail to improve.    Please note that this dictation was created using voice recognition software. I have made every reasonable attempt to correct obvious errors, but I expect that there are errors of grammar and possibly content that I did not discover before finalizing the note.

## 2023-06-21 NOTE — ASSESSMENT & PLAN NOTE
Doing well with his anxiety.  He is also requesting FMLA to be completed for his intermittently for anxiety.  No concern currently with his stuttering.

## 2023-06-21 NOTE — ASSESSMENT & PLAN NOTE
This is a pleasant 38-year-old male who was seen on the 14th at the ER for rectal bleeding.  Next day had a colonoscopy.  Noted for hemorrhoids otherwise unremarkable colonoscopy.  Pathology report negative.  Has to follow-up with GI.  Tries his best to limit triggers for hemorrhoids.  He will need FMLA for the episode of missing work.  He plans to return to work tomorrow on the 26th.

## 2023-06-21 NOTE — LETTER
June 21, 2023         Patient: Suresh Ho   YOB: 1985   Date of Visit: 6/21/2023           To Whom it May Concern:    Suresh Ho was seen in my clinic on 6/21/2023. He may return to work without restrictions on 6/22/2023.    If you have any questions or concerns, please don't hesitate to call.        Sincerely,           Luis Pradhan P.A.-C.  Electronically Signed

## 2023-07-14 ENCOUNTER — OFFICE VISIT (OUTPATIENT)
Dept: MEDICAL GROUP | Facility: MEDICAL CENTER | Age: 38
End: 2023-07-14
Payer: COMMERCIAL

## 2023-07-14 ENCOUNTER — HOSPITAL ENCOUNTER (OUTPATIENT)
Facility: MEDICAL CENTER | Age: 38
End: 2023-07-14
Attending: PHYSICIAN ASSISTANT
Payer: COMMERCIAL

## 2023-07-14 VITALS
HEART RATE: 65 BPM | WEIGHT: 177 LBS | BODY MASS INDEX: 30.22 KG/M2 | HEIGHT: 64 IN | TEMPERATURE: 97 F | SYSTOLIC BLOOD PRESSURE: 120 MMHG | OXYGEN SATURATION: 97 % | DIASTOLIC BLOOD PRESSURE: 80 MMHG

## 2023-07-14 DIAGNOSIS — F98.5 ADULT ONSET STUTTERING: ICD-10-CM

## 2023-07-14 DIAGNOSIS — F41.1 GAD (GENERALIZED ANXIETY DISORDER): ICD-10-CM

## 2023-07-14 PROBLEM — H57.12 PAIN OF LEFT EYE: Status: RESOLVED | Noted: 2023-04-24 | Resolved: 2023-07-14

## 2023-07-14 PROCEDURE — 99214 OFFICE O/P EST MOD 30 MIN: CPT | Performed by: PHYSICIAN ASSISTANT

## 2023-07-14 PROCEDURE — 3074F SYST BP LT 130 MM HG: CPT | Performed by: PHYSICIAN ASSISTANT

## 2023-07-14 PROCEDURE — 80307 DRUG TEST PRSMV CHEM ANLYZR: CPT

## 2023-07-14 PROCEDURE — 3079F DIAST BP 80-89 MM HG: CPT | Performed by: PHYSICIAN ASSISTANT

## 2023-07-14 RX ORDER — LORAZEPAM 1 MG/1
1 TABLET ORAL EVERY 8 HOURS PRN
Qty: 25 TABLET | Refills: 0 | Status: SHIPPED | OUTPATIENT
Start: 2023-07-14 | End: 2023-09-18

## 2023-07-14 ASSESSMENT — FIBROSIS 4 INDEX: FIB4 SCORE: 0.65

## 2023-07-15 NOTE — PROGRESS NOTES
Subjective:   Suresh Ho is a 38 y.o. male here today for anxiety and stuttering.    MILA (generalized anxiety disorder)  This is a pleasant 38-year-old male accompanied by his wife and baby daughter.  He is here today to discuss an accommodation form to be completed through his employer, [employer].  Unfortunately Nithin has been unable to figure out how severe his anxiety is with the [place].  They want a better grasp on his anxiety and how it affects his work.  Earlier today I read a [employer] message and given the message I did a referral to psychiatry.  He is aware that.  He is also requesting lorazepam for the flares of the anxiety.  It has been used in the past and has been effective.  Has been having more flares and typically will have them up to 3 times a month.  During these episodes he will have initial chest pain with shortness of breath and then his body will freeze up.  He will lose the mobility of both his upper and lower extremities.  He will have a tremor.  He will also start stuttering.  Today he is going through some of those issues.  The form today is to help clarify what he can and cannot do during these flares.  He is unable to walk.  He is unable to type.  He is unable to communicate.  There were several other functional limitations with his anxiety.       Current medicines (including changes today)  Current Outpatient Medications   Medication Sig Dispense Refill    LORazepam (ATIVAN) 1 MG Tab Take 1 Tablet by mouth every 8 hours as needed for Anxiety for up to 10 days. 25 Tablet 0    omeprazole (PRILOSEC) 20 MG delayed-release capsule Take 20 mg by mouth every day.      multivitamin Tab Take 1 Tablet by mouth every day.       No current facility-administered medications for this visit.     He  has no past medical history on file.    Social History and Family History were reviewed and updated.    ROS   No chest pain, no shortness of breath, no abdominal pain and all other systems were  "reviewed and are negative.       Objective:     /80 (BP Location: Left arm, Patient Position: Sitting, BP Cuff Size: Adult)   Pulse 65   Temp 36.1 °C (97 °F) (Temporal)   Ht 1.626 m (5' 4\")   Wt 80.3 kg (177 lb)   SpO2 97%  Body mass index is 30.38 kg/m².   Physical Exam:  Constitutional: Alert, no distress.  Speech with stuttering pattern at times.  Skin: Warm, dry, good turgor, no rashes in visible areas.  Eye: Equal, round and reactive, conjunctiva clear, lids normal.  ENMT: Lips without lesions, good dentition, oropharynx clear.  Neck: Trachea midline, no masses.   Lymph: No cervical or supraclavicular lymphadenopathy  Respiratory: Unlabored respiratory effort.  Neuro: Tremor noted.  Psych: Alert and oriented x3, normal affect and mood.        Assessment and Plan:   The following treatment plan was discussed    1. MILA (generalized anxiety disorder)  Chronic condition with recent exacerbation.  He is aware that I referred him to psychiatry.  He must follow-up to discuss medication.  I did prescribe Ativan which he was provided in the past.  Urine drug screen and agreement were performed.  Form was completed and returned.  I am disappointed that his agency continues to work through him and not directly through me.  They have provided me no insight as to what is needed in order to accommodate his chronic condition at work.  Hopefully with the new form completed have some direction on how to best deal with his symptoms at work.  - Controlled Substance Treatment Agreement  - URINE DRUG SCREEN; Future  - LORazepam (ATIVAN) 1 MG Tab; Take 1 Tablet by mouth every 8 hours as needed for Anxiety for up to 10 days.  Dispense: 25 Tablet; Refill: 0    2. Adult onset stuttering  Chronic condition secondary to anxiety.  Is a stuttering is noted.  Discussed following up with psychiatry.  When this current flare of anxiety resides his stuttering will resolve.         Followup: Return in about 3 months (around " 10/14/2023), or if symptoms worsen or fail to improve.    Please note that this dictation was created using voice recognition software. I have made every reasonable attempt to correct obvious errors, but I expect that there are errors of grammar and possibly content that I did not discover before finalizing the note.

## 2023-07-15 NOTE — ASSESSMENT & PLAN NOTE
This is a pleasant 38-year-old male accompanied by his wife and baby daughter.  He is here today to discuss an accommodation form to be completed through his employer, [employer].  Unfortunately [employer]ithin has been unable to figure out how severe his anxiety is with the [place] forms.  They want a better grasp on his anxiety and how it affects his work.  Earlier today I read a [employer] message and given the message I did a referral to psychiatry.  He is aware that.  He is also requesting lorazepam for the flares of the anxiety.  It has been used in the past and has been effective.  Has been having more flares and typically will have them up to 3 times a month.  During these episodes he will have initial chest pain with shortness of breath and then his body will freeze up.  He will lose the mobility of both his upper and lower extremities.  He will have a tremor.  He will also start stuttering.  Today he is going through some of those issues.  The form today is to help clarify what he can and cannot do during these flares.  He is unable to walk.  He is unable to type.  He is unable to communicate.  There were several other functional limitations with his anxiety.

## 2023-07-16 LAB
AMPHETAMINES UR QL: NEGATIVE NG/ML
BARBITURATES UR QL: NEGATIVE NG/ML
BENZODIAZ UR QL: NEGATIVE NG/ML
CANNABINOIDS UR QL SCN: NEGATIVE NG/ML
COCAINE UR QL: NEGATIVE NG/ML
DRUG SCREEN COMMENT UR-IMP: NORMAL
MDMA CTO UR SCN-MCNC: NEGATIVE NG/ML
METHADONE UR QL: NEGATIVE NG/ML
OPIATES UR QL: NEGATIVE NG/ML
OXYCODONE CTO UR SCN-MCNC: NEGATIVE NG/ML
PCP UR QL SCN: NEGATIVE NG/ML
PROPOXYPH UR QL: NEGATIVE NG/ML

## 2023-07-17 ENCOUNTER — APPOINTMENT (OUTPATIENT)
Dept: MEDICAL GROUP | Facility: MEDICAL CENTER | Age: 38
End: 2023-07-17
Payer: COMMERCIAL

## 2023-07-21 ENCOUNTER — OCCUPATIONAL MEDICINE (OUTPATIENT)
Dept: URGENT CARE | Facility: PHYSICIAN GROUP | Age: 38
End: 2023-07-21
Payer: COMMERCIAL

## 2023-07-21 VITALS
HEIGHT: 64 IN | DIASTOLIC BLOOD PRESSURE: 78 MMHG | SYSTOLIC BLOOD PRESSURE: 126 MMHG | HEART RATE: 98 BPM | WEIGHT: 177 LBS | OXYGEN SATURATION: 98 % | BODY MASS INDEX: 30.22 KG/M2 | RESPIRATION RATE: 16 BRPM | TEMPERATURE: 97.8 F

## 2023-07-21 DIAGNOSIS — F41.1 GAD (GENERALIZED ANXIETY DISORDER): ICD-10-CM

## 2023-07-21 DIAGNOSIS — F43.9 STRESS: ICD-10-CM

## 2023-07-21 PROCEDURE — 3078F DIAST BP <80 MM HG: CPT | Performed by: FAMILY MEDICINE

## 2023-07-21 PROCEDURE — 3074F SYST BP LT 130 MM HG: CPT | Performed by: FAMILY MEDICINE

## 2023-07-21 PROCEDURE — 99214 OFFICE O/P EST MOD 30 MIN: CPT | Performed by: FAMILY MEDICINE

## 2023-07-21 RX ORDER — FLUOXETINE 10 MG/1
CAPSULE ORAL
Qty: 30 CAPSULE | Refills: 0 | Status: SHIPPED | OUTPATIENT
Start: 2023-07-21 | End: 2023-12-01

## 2023-07-21 ASSESSMENT — ENCOUNTER SYMPTOMS: NERVOUS/ANXIOUS: 1

## 2023-07-21 ASSESSMENT — FIBROSIS 4 INDEX: FIB4 SCORE: 0.65

## 2023-07-21 NOTE — PROGRESS NOTES
"Subjective     Suresh Ho is a 38 y.o. male who presents with Anxiety (Pt states it first started by an anxiety accident that sent him to the ER at work./Pt states this started in 2021 but was mislead by work for this work comp. Pt states it has now turned into chronic anxiety and now has the support to get the correct paperwork )      DOI 07/09/2021  ISABEL: Stress/Anxiety developed from reported toxic/demanding work environment/atmosphere. Gets episodes of anxiety or panic attacks (chest pain, tremors, tingling, sob and body locks up and headaches) when gets stressed at work. Usually needs a day off to recover.      Tried Celexa from pcp earlier this year but says made him feel more anxious so stopped taking it. Is ok with trying another daily anxiety maintenance med.     HPI    Review of Systems   Psychiatric/Behavioral:  Negative for suicidal ideas. The patient is nervous/anxious.    All other systems reviewed and are negative.             Objective     /78   Pulse 98   Temp 36.6 °C (97.8 °F) (Temporal)   Resp 16   Ht 1.626 m (5' 4\")   Wt 80.3 kg (177 lb)   SpO2 98%   BMI 30.38 kg/m²      Physical Exam  Vitals and nursing note reviewed.   Constitutional:       General: He is not in acute distress.     Appearance: Normal appearance. He is well-developed.   HENT:      Head: Normocephalic.   Cardiovascular:      Heart sounds: Normal heart sounds. No murmur heard.  Pulmonary:      Effort: Pulmonary effort is normal.   Neurological:      Mental Status: He is alert.      Motor: No abnormal muscle tone.   Psychiatric:         Mood and Affect: Mood normal.         Behavior: Behavior normal.                             Assessment & Plan     1. MILA (generalized anxiety disorder)  Referral to Occupational Medicine    FLUoxetine (PROZAC) 10 MG Cap      2. Stress  Referral to Occupational Medicine    FLUoxetine (PROZAC) 10 MG Cap          Full Duty but may take 1-2 days off a week as needed for " stress/anxiety    Pertinent prior lab work and/or imaging studies in Epic have been reviewed by me today on day of this visit.      Pertinent prior office visit notes in Epic have been reviewed by me today on day of this visit.

## 2023-07-21 NOTE — LETTER
"EMPLOYEE’S CLAIM FOR COMPENSATION/ REPORT OF INITIAL TREATMENT  FORM C-4  PLEASE TYPE OR PRINT    EMPLOYEE’S CLAIM - PROVIDE ALL INFORMATION REQUESTED   First Name  Suresh Last Name  [place] Birthdate                    1985                Sex  male Claim Number (Insurer’s Use Only)   Home Address  [address] Age  38 y.o. 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 (if applicable)  arrived at work/ walking to office    How did this injury or occupational disease occur? (Be specific and answer in detail. Use additional sheet if necessary)  During my time at [employer], I experienced consistently high workloads with unrealistic deadlines, inadequate training for new responsibilities, and lack of support from management when seeking help of expressing concerns, along with work hours and only get one day off a week, consistently traveling to [place] and back to [place]. These factors created a stressful and anxiety inducing work environment which ultimately led to developing my disorder.   If you believe that you have an " occupational disease, when did you first have knowledge of the disability and its relationship to your employment?  07-10-21 Witnesses to the Accident (if applicable)  Tam Apple/ he drove me to the ER      Nature of Injury or Occupational Disease  Workers' Compensation  Part(s) of Body Injured or Affected  Skull, Lower Arm (R), Chest    I CERTIFY THAT THE ABOVE IS TRUE AND CORRECT TO T HE BEST OF MY KNOWLEDGE AND THAT I HAVE PROVIDED THIS INFORMATION IN ORDER TO OBTAIN THE BENEFITS OF [place], OR CHAPTER 617 [place]).  I HEREBY AUTHORIZE ANY PHYSICIAN, CHIROPRACTOR, SURGEON, PRACTITIONER OR ANY OTHER PERSON, ANY HOSPITAL, INCLUDING [facility] OR [facility], ANY  MEDICAL SERVICE ORGANIZATION, ANY INSURANCE COMPANY, OR OTHER INSTITUTION OR ORGANIZATION TO RELEASE TO EACH OTHER, ANY MEDICAL OR OTHER INFORMATION, INCLUDING BENEFITS PAID OR PAYABLE, PERTINENT TO THIS INJURY OR DISEASE, EXCEPT INFORMATION RELATIVE TO DIAGNOSIS, TREATMENT AND/OR COUNSELING FOR AIDS, PSYCHOLOGICAL CONDITIONS, ALCOHOL OR CONTROLLED SUBSTANCES, FOR WHICH I MUST GIVE SPECIFIC AUTHORIZATION.  A PHOTOSTAT OF THIS AUTHORIZATION SHALL BE VALID AS THE ORIGINAL.       Date 07/21/2023   Place [facility] Employee’s Original or  *Electronic Signature   THIS REPORT MUST BE COMPLETED AND MAILED WITHIN 3 WORKING DAYS OF TREATMENT   Place  [facility]  Name of Facility  [facility]   Date  7/21/2023 Diagnosis and Description of Injury or Occupational Disease  (F41.1) MILA (generalized anxiety disorder)  (F43.9) Stress Is there evidence that the injured employee was under the influence of alcohol and/or another controlled substance at the time of accident?  ? No ? Yes (if yes, please explain)   Hour  1:46 PM   Diagnoses of MILA (generalized anxiety disorder) and Stress were pertinent to this visit. No   Treatment  Referral to  occmed, work restrictions   Have you advised the patient to remain off work five days or     more?    X-Ray Findings      ? Yes Indicate dates:   From   To      From information given by the employee, together with medical evidence, can        you directly connect this injury or occupational disease as job incurred?    Comments:indeterminate ? No If no, is the injured employee capable of:  ? full duty  No ? modified duty  Yes   Is additional medical care by a physician indicated?  Yes If modified duty, specify any limitations / restrictions  Full Duty but may take 1-2 days off a week as needed for stress/anxiety   Do you know of any previous injury or disease contributing to this condition or occupational disease?  ? Yes ? No (Explain if yes)                          No   Date  7/21/2023 Print Health Care Provider's  Name  Devang Murillo M.D. I certify that the employer’s copy of  this form was delivered to the employer on:   Address  [address] Insurer’s Use Only     [place] [place]  [address]    Provider’s Tax ID Number  [id number] Telephone  Dept: [phone]             Health Care Provider’s Original or Electronic Signature  e-SignDEVANG MURILLO M.D. Degree (MD,DO, DC,PA-C,APRN)  MD      * Complete and attach Release of Information (Form C-4A) when injured employee signs C-4 Form electronically  ORIGINAL - TREATING HEALTHCARE PROVIDER PAGE 2 - INSURER/TPA PAGE 3 - EMPLOYER PAGE 4 - EMPLOYEE             Form C-4 (rev.08/21)           BRIEF DESCRIPTION OF RIGHTS AND BENEFITS  (Pursuant to [place] 616C.050)    Notice of Injury or Occupational Disease (Incident Report Form C-1): If an injury or occupational disease (OD) arises out of and in the course of employment, you must provide written notice to your employer as soon as practicable, but no later than 7 days after the accident or OD. Your employer shall maintain a sufficient supply of the required forms.    Claim for Compensation  "(Form C-4): If medical treatment is sought, the form C-4 is available at the place of initial treatment. A completed \"Claim for Compensation\" (Form C-4) must be filed within 90 days after an accident or OD. The treating physician or chiropractor must, within 3 working days after treatment, complete and mail to the employer, the employer's insurer and third-party , the Claim for Compensation.    Medical Treatment: If you require medical treatment for your on-the-job injury or OD, you may be required to select a physician or chiropractor from a list provided by your workers’ compensation insurer, if it has contracted with an Organization for Managed Care (MCO) or Preferred Provider Organization (PPO) or providers of health care. If your employer has not entered into a contract with an MCO or PPO, you may select a physician or chiropractor from the Panel of Physicians and Chiropractors. Any medical costs related to your industrial injury or OD will be paid by your insurer.    Temporary Total Disability (TTD): If your doctor has certified that you are unable to work for a period of at least 5 consecutive days, or 5 cumulative days in a 20-day period, or places restrictions on you that your employer does not accommodate, you may be entitled to TTD compensation.    Temporary Partial Disability (TPD): If the wage you receive upon reemployment is less than the compensation for TTD to which you are entitled, the insurer may be required to pay you TPD compensation to make up the difference. TPD can only be paid for a maximum of 24 months.    Permanent Partial Disability (PPD): When your medical condition is stable and there is an indication of a PPD as a result of your injury or OD, within 30 days, your insurer must arrange for an evaluation by a rating physician or chiropractor to determine the degree of your PPD. The amount of your PPD award depends on the date of injury, the results of the PPD evaluation, your " age and wage.    Permanent Total Disability (PTD): If you are medically certified by a treating physician or chiropractor as permanently and totally disabled and have been granted a PTD status by your insurer, you are entitled to receive monthly benefits not to exceed 66 2/3% of your average monthly wage. The amount of your PTD payments is subject to reduction if you previously received a lump-sum PPD award.    Vocational Rehabilitation Services: You may be eligible for vocational rehabilitation services if you are unable to return to the job due to a permanent physical impairment or permanent restrictions as a result of your injury or occupational disease.    Transportation and Per Arleth Reimbursement: You may be eligible for travel expenses and per arleth associated with medical treatment.    Reopening: You may be able to reopen your claim if your condition worsens after claim closure.     Appeal Process: If you disagree with a written determination issued by the insurer or the insurer does not respond to your request, you may appeal to the Department of Administration, , by following the instructions contained in your determination letter. You must appeal the determination within 70 days from the date of the determination letter at [address], [address], [place], [place] [address], or [address], [place] [address]. If you disagree with the  decision, you may appeal to the Department of Administration, . You must file your appeal within 30 days from the date of the  decision letter at [address], [address], [place], [place] [address], or [address] [facility] [address], [place] [address]. If you disagree with a decision of an , you may file a petition for judicial review with the District Court. You must do so within 30 days of the Appeal Officer’s decision. You may be represented by an   at your own expense or you may contact the [facility] for possible representation.    [place]  for Injured Workers (NAIW): If you disagree with a  decision, you may request that NAIW represent you without charge at an  Hearing. For information regarding denial of benefits, you may contact the [facility] at: [address], [address], [place] [address], [phone], or [address], [address], [place] [address], [phone]    To File a Complaint with the Division: If you wish to file a complaint with the  of the Division of Industrial Relations (DIR),  please contact the Workers’ Compensation Section, [address], [address], [place], [place] [address], telephone [phone]) [phone], or [address], [address], [place], [place] [address], telephone [phone].    For assistance with Workers’ Compensation Issues: You may contact the [facility] Office for Consumer Health Assistance, [address], [address], [place], [place] [address], Toll Free [phone], Web site: http://[facility].[place].gov/Programs/OTILIO E-mail: [email or web address].[facility]              __________________________________________________________________                                    _____07/21/2023______            Employee Name / Signature                                                                                                                            Date                                                                                                                                                                                                                              D-2 (rev. 10/20)

## 2023-07-21 NOTE — LETTER
[place] [address] [facility] [place] [address]  Phone:  [phone] - Fax:  [phone]   [facility] Progress Report and Disability Certification  Date of Service: 7/21/2023   No Show:  No  Date / Time of Next Visit: 7/24/2023 SOTO ADORNO   Claim Information   Patient Name: Suresh Ho  Claim Number:     Employer: [employer]  Date of Injury: 7/9/2021     Insurer / TPA: [employer]  ID / SSN:     Occupation: construction / superintendent  Diagnosis: Diagnoses of MILA (generalized anxiety disorder) and Stress were pertinent to this visit.    Medical Information   Related to Industrial Injury?   Comments:indeterminate    Subjective Complaints:  DOI 07/09/2021  ISABEL: Stress/Anxiety developed from reported toxic/demanding work environment/atmosphere. Gets episodes of anxiety or panic attacks (chest pain, tremors, tingling, sob and body locks up and headaches) when gets stressed at work. Usually needs a day off to recover.    Objective Findings:     Pre-Existing Condition(s):     Assessment:   Initial Visit    Status: Discharged / Care Transfer  Permanent Disability:No    Plan:      Diagnostics:      Comments:       Disability Information   Status: Released to Restricted Duty    From:  7/21/2023  Through: 7/24/2023 Restrictions are: Temporary   Physical Restrictions   Sitting:    Standing:    Stooping:    Bending:      Squatting:    Walking:    Climbing:    Pushing:      Pulling:    Other:    Reaching Above Shoulder (L):   Reaching Above Shoulder (R):       Reaching Below Shoulder (L):    Reaching Below Shoulder (R):      Not to exceed Weight Limits   Carrying(hrs):   Weight Limit(lb):   Lifting(hrs):   Weight  Limit(lb):     Comments:    ** Full Duty but may take 1-2 days off a week as needed for stress/anxiety **    Repetitive Actions   Hands: i.e. Fine Manipulations from Grasping:     Feet: i.e. Operating Foot Controls:     Driving /  Operate Machinery:     Health Care Provider’s Original or Electronic Signature  Devang Bo M.D. Health Care Provider’s Original or Electronic Signature    Onofre Shaffer DO MPH     Clinic Name / Location: [address] [address] Clinic Phone Number: Dept: [phone]   Appointment Time: 11:45 Am Visit Start Time: 1:46 PM   Check-In Time:  12:04 Pm Visit Discharge Time: 2:30 PM   Original-Treating Physician or Chiropractor    Page 2-Insurer/TPA    Page 3-Employer    Page 4-Employee

## 2023-07-31 ENCOUNTER — OFFICE VISIT (OUTPATIENT)
Dept: MEDICAL GROUP | Facility: MEDICAL CENTER | Age: 38
End: 2023-07-31
Payer: COMMERCIAL

## 2023-07-31 ENCOUNTER — OCCUPATIONAL MEDICINE (OUTPATIENT)
Dept: OCCUPATIONAL MEDICINE | Facility: CLINIC | Age: 38
End: 2023-07-31
Payer: COMMERCIAL

## 2023-07-31 VITALS
DIASTOLIC BLOOD PRESSURE: 70 MMHG | HEART RATE: 51 BPM | OXYGEN SATURATION: 100 % | TEMPERATURE: 97 F | WEIGHT: 179 LBS | SYSTOLIC BLOOD PRESSURE: 112 MMHG | BODY MASS INDEX: 30.56 KG/M2 | HEIGHT: 64 IN

## 2023-07-31 VITALS
DIASTOLIC BLOOD PRESSURE: 78 MMHG | RESPIRATION RATE: 14 BRPM | BODY MASS INDEX: 30.73 KG/M2 | OXYGEN SATURATION: 100 % | WEIGHT: 180 LBS | SYSTOLIC BLOOD PRESSURE: 110 MMHG | HEART RATE: 51 BPM | HEIGHT: 64 IN | TEMPERATURE: 96.6 F

## 2023-07-31 DIAGNOSIS — F41.1 GAD (GENERALIZED ANXIETY DISORDER): ICD-10-CM

## 2023-07-31 DIAGNOSIS — F43.9 STRESS: ICD-10-CM

## 2023-07-31 DIAGNOSIS — F98.5 ADULT ONSET STUTTERING: ICD-10-CM

## 2023-07-31 PROBLEM — K62.5 RECTAL BLEEDING: Status: RESOLVED | Noted: 2023-06-21 | Resolved: 2023-07-31

## 2023-07-31 PROCEDURE — 3074F SYST BP LT 130 MM HG: CPT | Performed by: PHYSICIAN ASSISTANT

## 2023-07-31 PROCEDURE — 3074F SYST BP LT 130 MM HG: CPT | Performed by: NURSE PRACTITIONER

## 2023-07-31 PROCEDURE — 3078F DIAST BP <80 MM HG: CPT | Performed by: PHYSICIAN ASSISTANT

## 2023-07-31 PROCEDURE — 99214 OFFICE O/P EST MOD 30 MIN: CPT | Performed by: PHYSICIAN ASSISTANT

## 2023-07-31 PROCEDURE — 99213 OFFICE O/P EST LOW 20 MIN: CPT | Performed by: NURSE PRACTITIONER

## 2023-07-31 PROCEDURE — 3078F DIAST BP <80 MM HG: CPT | Performed by: NURSE PRACTITIONER

## 2023-07-31 ASSESSMENT — FIBROSIS 4 INDEX
FIB4 SCORE: 0.65
FIB4 SCORE: 0.65

## 2023-07-31 NOTE — PROGRESS NOTES
Subjective:     Suresh Ho is a 38 y.o. male who presents for Other (WC DOI 7/9/21 skull, rt arm, chest, feeling room 16)      (Copied from Previous Visit) (Pt states it first started by an anxiety accident that sent him to the ER at work./Pt states this started in 2021 but was mislead by work for this work comp. Pt states it has now turned into chronic anxiety and now has the support to get the correct paperwork )   DOI 07/09/2021  ISABEL: Stress/Anxiety developed from reported toxic/demanding work environment/atmosphere. Gets episodes of anxiety or panic attacks (chest pain, tremors, tingling, sob and body locks up and headaches) when gets stressed at work. Usually needs a day off to recover.     Tried Celexa from pcp earlier this year but says made him feel more anxious so stopped taking it. Is ok with trying another daily anxiety maintenance med.     Patient states that symptoms have minimally improved.  He states that when he is in certain situations. In the past he has completed therapy which was very helpful. He denies suicidal ideation, harm to self or others, or substance abuse. He has been able to work remotely if needed and needs to taken a day or two if needed. He feels like the current medications and uses Ativan as needed. He feels that he can handle stress with current support system in place. He is pending scheduling with Mental Health. Continue with current restrictions. Plan of care discussed with patient.     ROS: All systems were reviewed on intake form, form was reviewed and signed. See scanned documents in media. Pertinent positives and negatives included in HPI.    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 Notes increased anxiety    Assessment/Plan:       1. MILA (generalized anxiety disorder)    2. Stress    Released to Full Duty FROM 7/31/2023 TO 9/1/2023  Full Duty but may take 1-2 days off a week as needed for stress/anxiety     Follow-up in 4 weeks  Full Duty but may take 1-2 days off a week as needed for stress/anxiety   Mental health appointments as scheduled   Continue with coping skills as needed   Take medications as prescribed  Strict ED precautions discussed with patient        Differential diagnosis, natural history, supportive care, and indications for immediate follow-up discussed.    Approximately 25 minutes were spent in reviewing notes, preparing for visit, obtaining history, exam and evaluation, patient counseling/education and post visit documentation/orders.    "

## 2023-07-31 NOTE — PROGRESS NOTES
"Subjective:   Suresh Ho is a 38 y.o. male here today for recent exacerbation of anxiety and FMLA completion.    MILA (generalized anxiety disorder)  This is a pleasant 38-year-old male here today with his wife and baby daughter.  He sent me a message through [employer] last week requiring FMLA to be provided for a period of time off.  This will start on 14 July and end on the 14th of next month.  He will return to work on August 14.  When I saw him last month on the 14th he was dealing with severe anxiety.  During that time he had a flare and he started stuttering more.  He also dealt with worsening tremors and paralysis of his upper and lower extremities.  They appears he is doing much better.  He was seen recently by occupational health and was started on fluoxetine.  Has had some fatigue with the drug.  He has plans to follow through his work see psychiatry.  There may be a chance as well that we might move within the organization.  He is hopeful that Sigrid his disability.         Current medicines (including changes today)  Current Outpatient Medications   Medication Sig Dispense Refill    FLUoxetine (PROZAC) 10 MG Cap 1 cap 3x/week (M W F) 30 Capsule 0    omeprazole (PRILOSEC) 20 MG delayed-release capsule Take 20 mg by mouth every day.      multivitamin Tab Take 1 Tablet by mouth every day.       No current facility-administered medications for this visit.     He  has no past medical history on file.    Social History and Family History were reviewed and updated.    ROS   No chest pain, no shortness of breath, no abdominal pain and all other systems were reviewed and are negative.       Objective:     /70 (BP Location: Right arm, Patient Position: Sitting, BP Cuff Size: Adult)   Pulse (!) 51   Temp 36.1 °C (97 °F) (Temporal)   Ht 1.626 m (5' 4\")   Wt 81.2 kg (179 lb)   SpO2 100%  Body mass index is 30.73 kg/m².   Physical Exam:  Constitutional: Alert, no distress.  Skin: Warm, dry, good " turgor, no rashes in visible areas.  Eye: Equal, round and reactive, conjunctiva clear, lids normal.  ENMT: Lips without lesions, good dentition, oropharynx clear.  Neck: Trachea midline, no masses.   Respiratory: Unlabored respiratory effort.  Psych: Alert and oriented x3, normal affect and mood.        Assessment and Plan:   The following treatment plan was discussed    1. MILA (generalized anxiety disorder)  Chronic condition with recent exacerbation.  Discussed continuing fluoxetine.  Follow-up with psychiatry.  FMLA form was completed.  I will fax the form with this note to Nithin.  Hopefully in the future if items need to be changed on the FMLA that I will be contacted directly by [employer].  Current FMLA will provide time off starting on 7- until return to work date of 8-.    2. Adult onset stuttering  Chronic condition with recent exacerbation with MILA.  Currently doing well.  Stable.  FMLA form was completed.  Follow-up with psychiatry.  Continue fluoxetine as directed.         Followup: Return in about 3 months (around 10/31/2023), or if symptoms worsen or fail to improve.    Please note that this dictation was created using voice recognition software. I have made every reasonable attempt to correct obvious errors, but I expect that there are errors of grammar and possibly content that I did not discover before finalizing the note.

## 2023-07-31 NOTE — ASSESSMENT & PLAN NOTE
This is a pleasant 38-year-old male here today with his wife and baby daughter.  He sent me a message through [employer] last week requiring FMLA to be provided for a period of time off.  This will start on 14 July and end on the 14th of next month.  He will return to work on August 14.  When I saw him last month on the 14th he was dealing with severe anxiety.  During that time he had a flare and he started stuttering more.  He also dealt with worsening tremors and paralysis of his upper and lower extremities.  They appears he is doing much better.  He was seen recently by occupational health and was started on fluoxetine.  Has had some fatigue with the drug.  He has plans to follow through his work see psychiatry.  There may be a chance as well that we might move within the organization.  He is hopeful that Sigrid his disability.

## 2023-07-31 NOTE — LETTER
[address],   [address] [address]  Phone:  [phone] - Fax:  [phone]   [facility]up[facility] Progress Report and Disability Certification  Date of Service: 7/31/2023   No Show:  No  Date / Time of Next Visit: 9/1/2023@1 PM   Claim Information   Patient Name: Suresh Ho  Claim Number:     Employer: [employer]  Date of Injury: 7/9/2021     Insurer / TPA: [employer]  ID / SSN:     Occupation: construction / superintendent  Diagnosis: Diagnoses of MILA (generalized anxiety disorder) and Stress were pertinent to this visit.    Medical Information   Related to Industrial Injury?   Comments:Indeterminent    Subjective Complaints:  (Copied from Previous Visit) (Pt states it first started by an anxiety accident that sent him to the ER at work./Pt states this started in 2021 but was mislead by work for this work comp. Pt states it has now turned into chronic anxiety and now has the support to get the correct paperwork )   DOI 07/09/2021  ISABEL: Stress/Anxiety developed from reported toxic/demanding work environment/atmosphere. Gets episodes of anxiety or panic attacks (chest pain, tremors, tingling, sob and body locks up and headaches) when gets stressed at work. Usually needs a day off to recover.     Tried Celexa from pcp earlier this year but says made him feel more anxious so stopped taking it. Is ok with trying another daily anxiety maintenance med.     Patient states that symptoms have minimally improved.  He states that when he is in certain situations. In the past he has completed therapy which was very helpful. He denies suicidal ideation, harm to self or others, or substance abuse. He has been able to work remotely if needed and needs to taken a day or two if needed. He feels like the current medications and uses Ativan as needed. He feels that he can handle stress with current support system in place. He is pending  scheduling with Mental Health. Continue with current restrictions. Plan of care discussed with patient.    Objective Findings: Denies suicidal ideation, self-harm, or substances. Notes increased anxiety   Pre-Existing Condition(s):     Assessment:   Condition Same    Status: Additional Care Required  Permanent Disability:No    Plan: Consultation    Diagnostics:      Comments:  Follow-up in 4 weeks  Full Duty but may take 1-2 days off a week as needed for stress/anxiety   Mental health appointments as scheduled   Continue with coping skills as needed   Take medications as prescribed  Strict ED precautions discussed with patient        Disability Information   Status: Released to Full Duty    From:  7/31/2023  Through: 9/1/2023 Restrictions are: Temporary   Physical Restrictions   Sitting:    Standing:    Stooping:    Bending:      Squatting:    Walking:    Climbing:    Pushing:      Pulling:    Other:    Reaching Above Shoulder (L):   Reaching Above Shoulder (R):       Reaching Below Shoulder (L):    Reaching Below Shoulder (R):      Not to exceed Weight Limits   Carrying(hrs):   Weight Limit(lb):   Lifting(hrs):   Weight  Limit(lb):     Comments: Full Duty but may take 1-2 days off a week as needed for stress/anxiety       Repetitive Actions   Hands: i.e. Fine Manipulations from Grasping:     Feet: i.e. Operating Foot Controls:     Driving / Operate Machinery:     Health Care Provider’s Original or Electronic Signature  DAVID Carreon Health Care Provider’s Original or Electronic Signature    Onofre Shaffer DO MPH     Clinic Name / Location: [address],   [address]  [place] [address] Clinic Phone Number: Dept: [phone]   Appointment Time: 8:15 Am Visit Start Time: 8:13 AM   Check-In Time:  8:09 Am Visit Discharge Time:  9:36 AM   Original-Treating Physician or Chiropractor    Page 2-Insurer/TPA    Page 3-Employer    Page 4-Employee

## 2023-08-04 NOTE — ED NOTES
Pt resting comfortably with even chest rise and fall, reports no needs at this time, call light available and in reach.      Spoke to patient and relayed information below regarding receiving Shingrix at their pharmacy. Patient verbalized understanding.  No further questions at this time.

## 2023-08-18 ENCOUNTER — OFFICE VISIT (OUTPATIENT)
Dept: MEDICAL GROUP | Facility: MEDICAL CENTER | Age: 38
End: 2023-08-18
Payer: COMMERCIAL

## 2023-08-18 VITALS
SYSTOLIC BLOOD PRESSURE: 122 MMHG | BODY MASS INDEX: 29.71 KG/M2 | HEIGHT: 64 IN | HEART RATE: 64 BPM | TEMPERATURE: 98 F | OXYGEN SATURATION: 100 % | DIASTOLIC BLOOD PRESSURE: 70 MMHG | WEIGHT: 174 LBS

## 2023-08-18 DIAGNOSIS — F41.1 GAD (GENERALIZED ANXIETY DISORDER): ICD-10-CM

## 2023-08-18 DIAGNOSIS — F98.5 ADULT ONSET STUTTERING: ICD-10-CM

## 2023-08-18 PROCEDURE — 3074F SYST BP LT 130 MM HG: CPT | Performed by: PHYSICIAN ASSISTANT

## 2023-08-18 PROCEDURE — 99214 OFFICE O/P EST MOD 30 MIN: CPT | Performed by: PHYSICIAN ASSISTANT

## 2023-08-18 PROCEDURE — 3078F DIAST BP <80 MM HG: CPT | Performed by: PHYSICIAN ASSISTANT

## 2023-08-18 RX ORDER — TRAZODONE HYDROCHLORIDE 50 MG/1
TABLET ORAL
COMMUNITY
Start: 2023-08-08 | End: 2024-01-29

## 2023-08-18 ASSESSMENT — FIBROSIS 4 INDEX: FIB4 SCORE: 0.65

## 2023-08-18 NOTE — PROGRESS NOTES
Subjective:   Suresh Ho is a 38 y.o. male here today for anxiety.    MILA (generalized anxiety disorder)  This is a pleasant 38-year-old male who is here today to update recent physician questionnaire for accommodation.  The form I completed about a week ago was not sufficient.  They are requesting that more accommodations be noted on the form.  He is currently taking fluoxetine which has been somewhat effective.  Does cause drowsiness in the morning which does dissipate.  With his flares of anxiety in the past he has had stuttering as well as paralysis and tremors of his upper extremities.  This is a disabling condition but they would like it documented on the questionnaire that his symptoms are disabling.  He is currently followed with psychiatry.  He is hoping to be transition into another position at [employer].  Also hoping may be to work later on in the day because of how he has fatigue with taking fluoxetine.  He does not wish at this point to change the medication.  He was also placed back on [employer] because the form was not properly completed initially.  At some point he will need to have his [facility] redone for the time off.       Current medicines (including changes today)  Current Outpatient Medications   Medication Sig Dispense Refill    traZODone (DESYREL) 50 MG Tab TAKE 1 TABLET BY MOUTH EVERY NIGHT AT BEDTIME AS NEEDED      FLUoxetine (PROZAC) 10 MG Cap 1 cap 3x/week (M W F) 30 Capsule 0    omeprazole (PRILOSEC) 20 MG delayed-release capsule Take 20 mg by mouth every day.      multivitamin Tab Take 1 Tablet by mouth every day.       No current facility-administered medications for this visit.     He  has no past medical history on file.    Social History and Family History were reviewed and updated.    ROS   No chest pain, no shortness of breath, no abdominal pain and all other systems were reviewed and are negative.       Objective:     /70 (BP Location: Left arm, Patient Position: Sitting, BP  "Cuff Size: Adult)   Pulse 64   Temp 36.7 °C (98 °F) (Temporal)   Ht 1.626 m (5' 4\")   Wt 78.9 kg (174 lb)   SpO2 100%  Body mass index is 29.87 kg/m².   Physical Exam:  Constitutional: Alert, no distress.  Skin: Warm, dry, good turgor, no rashes in visible areas.  Eye: Equal, round and reactive, conjunctiva clear, lids normal.  ENMT: Lips without lesions, good dentition, oropharynx clear.  Neck: Trachea midline, no masses.   Respiratory: Unlabored respiratory effort.  Psych: Alert and oriented x3, normal affect and mood.        Assessment and Plan:   The following treatment plan was discussed    1. MILA (generalized anxiety disorder)  Chronic condition with severe disability at times.  Continue fluoxetine.  Continue to follow with psychiatry.  Form was completed and returned.  You may contact me through [employer] for the FMLA completion for the recent time off but if a note is required he will need to be seen.    2. Adult onset stuttering  Chronic condition which is secondary to anxiety.  Stable today.  No stuttering.         Followup: Return if symptoms worsen or fail to improve.    Please note that this dictation was created using voice recognition software. I have made every reasonable attempt to correct obvious errors, but I expect that there are errors of grammar and possibly content that I did not discover before finalizing the note.             "

## 2023-08-18 NOTE — ASSESSMENT & PLAN NOTE
This is a pleasant 38-year-old male who is here today to update recent physician questionnaire for accommodation.  The form I completed about a week ago was not sufficient.  They are requesting that more accommodations be noted on the form.  He is currently taking fluoxetine which has been somewhat effective.  Does cause drowsiness in the morning which does dissipate.  With his flares of anxiety in the past he has had stuttering as well as paralysis and tremors of his upper extremities.  This is a disabling condition but they would like it documented on the questionnaire that his symptoms are disabling.  He is currently followed with psychiatry.  He is hoping to be transition into another position at [employer].  Also hoping may be to work later on in the day because of how he has fatigue with taking fluoxetine.  He does not wish at this point to change the medication.  He was also placed back on [employer] because the form was not properly completed initially.  At some point he will need to have his [place] redone for the time off.

## 2023-08-28 ENCOUNTER — OFFICE VISIT (OUTPATIENT)
Dept: MEDICAL GROUP | Facility: MEDICAL CENTER | Age: 38
End: 2023-08-28
Payer: COMMERCIAL

## 2023-08-28 VITALS
OXYGEN SATURATION: 98 % | WEIGHT: 180 LBS | HEART RATE: 72 BPM | SYSTOLIC BLOOD PRESSURE: 122 MMHG | HEIGHT: 64 IN | DIASTOLIC BLOOD PRESSURE: 64 MMHG | TEMPERATURE: 98.4 F | BODY MASS INDEX: 30.73 KG/M2 | RESPIRATION RATE: 16 BRPM

## 2023-08-28 DIAGNOSIS — F41.1 GAD (GENERALIZED ANXIETY DISORDER): ICD-10-CM

## 2023-08-28 PROCEDURE — 99214 OFFICE O/P EST MOD 30 MIN: CPT | Performed by: PHYSICIAN ASSISTANT

## 2023-08-28 PROCEDURE — 3074F SYST BP LT 130 MM HG: CPT | Performed by: PHYSICIAN ASSISTANT

## 2023-08-28 PROCEDURE — 3078F DIAST BP <80 MM HG: CPT | Performed by: PHYSICIAN ASSISTANT

## 2023-08-28 ASSESSMENT — FIBROSIS 4 INDEX: FIB4 SCORE: 0.65

## 2023-08-28 NOTE — PROGRESS NOTES
"Subjective:   Suresh Ho is a 38 y.o. male here today for anxiety and completion of form for short-term disability.    MILA (generalized anxiety disorder)  This is a pleasant 38-year-old male here today to follow-up on his health.  He is here today for completion of recent short-term disability.  He was out from the 16th of this month he will return to work on the 19th of next month.  He is currently seeing Dr. Shin a psychiatrist.  He is going through therapy.  He is taking fluoxetine and trazodone.  All in the help of his chronic history of anxiety which has been debilitating the past.  Anxiety will trigger stuttering with associated paralysis and tremors.  He is hoping that eventually his employer will find a position at his job which will alleviate the stresses that trigger anxiety.  He is still awaiting to hear from them.       Current medicines (including changes today)  Current Outpatient Medications   Medication Sig Dispense Refill    traZODone (DESYREL) 50 MG Tab TAKE 1 TABLET BY MOUTH EVERY NIGHT AT BEDTIME AS NEEDED      FLUoxetine (PROZAC) 10 MG Cap 1 cap 3x/week (M W F) 30 Capsule 0    omeprazole (PRILOSEC) 20 MG delayed-release capsule Take 20 mg by mouth every day.      multivitamin Tab Take 1 Tablet by mouth every day.       No current facility-administered medications for this visit.     He  has no past medical history on file.    Social History and Family History were reviewed and updated.    ROS   No chest pain, no shortness of breath, no abdominal pain and all other systems were reviewed and are negative.       Objective:     /64   Pulse 72   Temp 36.9 °C (98.4 °F) (Temporal)   Resp 16   Ht 1.626 m (5' 4\")   Wt 81.6 kg (180 lb)   SpO2 98%  Body mass index is 30.9 kg/m².   Physical Exam:  Constitutional: Alert, no distress.  Skin: Warm, dry, good turgor, no rashes in visible areas.  Eye: Equal, round and reactive, conjunctiva clear, lids normal.  ENMT: Lips without " lesions, good dentition, oropharynx clear.  Neck: Trachea midline, no masses.   Respiratory: Unlabored respiratory effort.  Psych: Alert and oriented x3, normal affect and mood.        Assessment and Plan:   The following treatment plan was discussed    1. MILA (generalized anxiety disorder)  Chronic condition.  No recent exacerbations.  Appears to be doing quite well today.  Continue to follow with psychiatry.  Continue therapy.  Continue fluoxetine and trazodone.  Form was completed for him to be off of work until the 19th of next month.  He is doing well currently under the guidance of psychiatry and therapy.         Followup: Return if symptoms worsen or fail to improve, for Appointment for follow-up in November.    Please note that this dictation was created using voice recognition software. I have made every reasonable attempt to correct obvious errors, but I expect that there are errors of grammar and possibly content that I did not discover before finalizing the note.

## 2023-08-28 NOTE — ASSESSMENT & PLAN NOTE
This is a pleasant 38-year-old male here today to follow-up on his health.  He is here today for completion of recent short-term disability.  He was out from the 16th of this month he will return to work on the 19th of next month.  He is currently seeing Dr. Shin a psychiatrist.  He is going through therapy.  He is taking fluoxetine and trazodone.  All in the help of his chronic history of anxiety which has been debilitating the past.  Anxiety will trigger stuttering with associated paralysis and tremors.  He is hoping that eventually his employer will find a position at his job which will alleviate the stresses that trigger anxiety.  He is still awaiting to hear from them.

## 2023-09-15 DIAGNOSIS — F41.1 GAD (GENERALIZED ANXIETY DISORDER): ICD-10-CM

## 2023-09-18 RX ORDER — LORAZEPAM 1 MG/1
TABLET ORAL
Qty: 30 TABLET | Refills: 0 | Status: SHIPPED | OUTPATIENT
Start: 2023-09-18 | End: 2023-10-18

## 2023-09-19 DIAGNOSIS — F41.1 GAD (GENERALIZED ANXIETY DISORDER): ICD-10-CM

## 2023-09-19 DIAGNOSIS — F43.9 STRESS: ICD-10-CM

## 2023-09-19 RX ORDER — FLUOXETINE 10 MG/1
CAPSULE ORAL
Qty: 30 CAPSULE | Refills: 0 | OUTPATIENT
Start: 2023-09-19

## 2023-09-19 RX ORDER — FLUOXETINE 10 MG/1
10 CAPSULE ORAL DAILY
Qty: 90 CAPSULE | Refills: 1 | Status: SHIPPED | OUTPATIENT
Start: 2023-09-19 | End: 2024-03-17

## 2023-10-09 ENCOUNTER — APPOINTMENT (OUTPATIENT)
Dept: MEDICAL GROUP | Facility: MEDICAL CENTER | Age: 38
End: 2023-10-09
Payer: COMMERCIAL

## 2023-10-10 ENCOUNTER — OFFICE VISIT (OUTPATIENT)
Dept: MEDICAL GROUP | Facility: MEDICAL CENTER | Age: 38
End: 2023-10-10
Payer: COMMERCIAL

## 2023-10-10 VITALS
TEMPERATURE: 96.9 F | SYSTOLIC BLOOD PRESSURE: 120 MMHG | DIASTOLIC BLOOD PRESSURE: 72 MMHG | WEIGHT: 178 LBS | HEIGHT: 64 IN | BODY MASS INDEX: 30.39 KG/M2 | HEART RATE: 64 BPM

## 2023-10-10 DIAGNOSIS — F98.5 ADULT ONSET STUTTERING: ICD-10-CM

## 2023-10-10 DIAGNOSIS — F41.1 GAD (GENERALIZED ANXIETY DISORDER): ICD-10-CM

## 2023-10-10 DIAGNOSIS — F41.0 PANIC DISORDER: ICD-10-CM

## 2023-10-10 PROCEDURE — 3078F DIAST BP <80 MM HG: CPT | Performed by: PHYSICIAN ASSISTANT

## 2023-10-10 PROCEDURE — 3074F SYST BP LT 130 MM HG: CPT | Performed by: PHYSICIAN ASSISTANT

## 2023-10-10 PROCEDURE — 99214 OFFICE O/P EST MOD 30 MIN: CPT | Performed by: PHYSICIAN ASSISTANT

## 2023-10-10 ASSESSMENT — FIBROSIS 4 INDEX: FIB4 SCORE: 0.65

## 2023-10-10 NOTE — LETTER
October 10, 2023         Patient: Suresh Ho   YOB: 1985   Date of Visit: 10/10/2023           To Whom it May Concern:    Suresh Ho was seen in my clinic on 10/10/2023.  During the month of September he and his family were dealing with COVID infections.  Unfortunately he was not able to be seen in the clinic.  His treatment has not changed during the month of September.  He is still seeing psychiatry and taking medications as directed.  Please excuse him for not being seen in September.    If you have any questions or concerns, please don't hesitate to call.    Sincerely,           Luis Pradhan P.A.-C.  Electronically Signed

## 2023-10-10 NOTE — ASSESSMENT & PLAN NOTE
This is a pleasant 38-year-old male here today to follow-up on his history of anxiety which caused him to be hospitalized back in 2021.  He is requesting a letter as last month he was unable to come in for an office visit.  He had COVID and some of his family members had COVID as well.  He states that his anxiety has improved as he is now taking Prozac 10 mg daily.  His psychiatrist had placed him on the medication few times a week instead of daily regiment.  He does also has a prescription for lorazepam he will take as needed for panic attacks.  Since he has been dosed on fluoxetine daily he has had to use less of the benzodiazepine.  Although anxiety is improving he will still have breakthrough panic attacks.  He still waiting for his accommodations to be put in the place at [employer] [facility] [employer].  Today he needs his short-term disability renewed.  He is looking now to have the disability extended until next August.  The exact date would be August 14, 2024.

## 2023-10-10 NOTE — PROGRESS NOTES
Subjective:   Suresh Ho is a 38 y.o. male here today for anxiety.    MILA (generalized anxiety disorder)  This is a pleasant 38-year-old male here today to follow-up on his history of anxiety which caused him to be hospitalized back in 2021.  He is requesting a letter as last month he was unable to come in for an office visit.  He had COVID and some of his family members had COVID as well.  He states that his anxiety has improved as he is now taking Prozac 10 mg daily.  His psychiatrist had placed him on the medication few times a week instead of daily regiment.  He does also has a prescription for lorazepam he will take as needed for panic attacks.  Since he has been dosed on fluoxetine daily he has had to use less of the benzodiazepine.  Although anxiety is improving he will still have breakthrough panic attacks.  He still waiting for his accommodations to be put in the place at [facility].  Today he needs his short-term disability renewed.  He is looking now to have the disability extended until next August.  The exact date would be August 14, 2024.       Current medicines (including changes today)  Current Outpatient Medications   Medication Sig Dispense Refill    FLUoxetine (PROZAC) 10 MG Cap Take 1 Capsule by mouth every day for 180 days. 90 Capsule 1    traZODone (DESYREL) 50 MG Tab TAKE 1 TABLET BY MOUTH EVERY NIGHT AT BEDTIME AS NEEDED      FLUoxetine (PROZAC) 10 MG Cap 1 cap 3x/week (M W F) 30 Capsule 0    omeprazole (PRILOSEC) 20 MG delayed-release capsule Take 20 mg by mouth every day.      multivitamin Tab Take 1 Tablet by mouth every day.       No current facility-administered medications for this visit.     He  has no past medical history on file.    Social History and Family History were reviewed and updated.    ROS   No chest pain, no shortness of breath, no abdominal pain and all other systems were reviewed and are negative.       Objective:     /72 (BP Location: Right arm, Patient  "Position: Sitting, BP Cuff Size: Adult)   Pulse 64   Temp 36.1 °C (96.9 °F) (Temporal)   Ht 1.626 m (5' 4\")   Wt 80.7 kg (178 lb)  Body mass index is 30.55 kg/m².   Physical Exam:  Constitutional: Alert, no distress.  Skin: Warm, dry, good turgor, no rashes in visible areas.  Eye: Equal, round and reactive, conjunctiva clear, lids normal.  ENMT: Lips without lesions, good dentition, oropharynx clear.  Respiratory: Unlabored respiratory effort.  Psych: Alert and oriented x3, normal affect and mood.        Assessment and Plan:   The following treatment plan was discussed    1. MILA (generalized anxiety disorder)  Chronic condition.  Uncontrolled as still taking lorazepam.  Completed form for short-term disability.  Extended short-term disability until August 14, 2024.  Continue to follow with psychiatry.  Continue fluoxetine 10 mg daily.  Continue lorazepam as needed.  That prescription is provided by psychiatry.      2. Adult onset stuttering  Chronic, intermittent condition secondary to anxiety exacerbation.  Stable today.  No stuttering noted.  Continue medications as directed.  Continue to follow with psychiatry.    3. Panic disorder  Chronic condition.  Uncontrolled but improving.  Continue to follow with psychiatry.  Continue lorazepam as directed.  Medication is prescribed by psychiatry.          Followup: Return in 4 weeks (on 11/7/2023), or if symptoms worsen or fail to improve.    Please note that this dictation was created using voice recognition software. I have made every reasonable attempt to correct obvious errors, but I expect that there are errors of grammar and possibly content that I did not discover before finalizing the note.             "

## 2023-11-03 ENCOUNTER — OFFICE VISIT (OUTPATIENT)
Dept: MEDICAL GROUP | Facility: MEDICAL CENTER | Age: 38
End: 2023-11-03
Payer: COMMERCIAL

## 2023-11-03 VITALS
HEART RATE: 60 BPM | HEIGHT: 64 IN | OXYGEN SATURATION: 99 % | WEIGHT: 183.4 LBS | DIASTOLIC BLOOD PRESSURE: 66 MMHG | TEMPERATURE: 98 F | SYSTOLIC BLOOD PRESSURE: 112 MMHG | BODY MASS INDEX: 31.31 KG/M2

## 2023-11-03 DIAGNOSIS — F41.1 GAD (GENERALIZED ANXIETY DISORDER): ICD-10-CM

## 2023-11-03 PROCEDURE — 3074F SYST BP LT 130 MM HG: CPT | Performed by: PHYSICIAN ASSISTANT

## 2023-11-03 PROCEDURE — 3078F DIAST BP <80 MM HG: CPT | Performed by: PHYSICIAN ASSISTANT

## 2023-11-03 PROCEDURE — 99214 OFFICE O/P EST MOD 30 MIN: CPT | Performed by: PHYSICIAN ASSISTANT

## 2023-11-03 ASSESSMENT — FIBROSIS 4 INDEX: FIB4 SCORE: 0.65

## 2023-11-03 NOTE — ASSESSMENT & PLAN NOTE
This is a pleasant 38-year-old male here today with his wife and young daughter.  His anxiety has gotten worse over the past week.  His stuttering has returned as well as the tremors.  He is taking his medications as directed.  He is also continue to follow with psychiatry.  He is here today to discuss both his combinations and his disability request.  There is a form that was a supplemental to his accommodations.  He has episodes of anxiety at work he will contact his emergency contact for a ride home.  His employers are also when he has an episode to allow him to go to a quieter area in the building.  They are not to swarm him.  He can take his medication there and call his emergency contact has not be picked up from work.  He should also have 1 to 2 days off of work for the anxiety.  Received a form from the disability claims department.  They are requesting all paperwork from August forward.  I did provide him complete inability to work until August 2024 given the chronicity and seriousness of his symptoms.  This time his anxiety is still impacting his day-to-day routines.   Never smoker

## 2023-11-03 NOTE — PROGRESS NOTES
Subjective:   Suresh Ho is a 38 y.o. male here today for chronic anxiety.    MILA (generalized anxiety disorder)  This is a pleasant 38-year-old male here today with his wife and young daughter.  His anxiety has gotten worse over the past week.  His stuttering has returned as well as the tremors.  He is taking his medications as directed.  He is also continue to follow with psychiatry.  He is here today to discuss both his combinations and his disability request.  There is a form that was a supplemental to his accommodations.  He has episodes of anxiety at work he will contact his emergency contact for a ride home.  His employers are also when he has an episode to allow him to go to a quieter area in the building.  They are not to swarm him.  He can take his medication there and call his emergency contact has not be picked up from work.  He should also have 1 to 2 days off of work for the anxiety.  Received a form from the disability claims department.  They are requesting all paperwork from August forward.  I did provide him complete inability to work until August 2024 given the chronicity and seriousness of his symptoms.  This time his anxiety is still impacting his day-to-day routines.       Current medicines (including changes today)  Current Outpatient Medications   Medication Sig Dispense Refill    FLUoxetine (PROZAC) 10 MG Cap Take 1 Capsule by mouth every day for 180 days. 90 Capsule 1    traZODone (DESYREL) 50 MG Tab TAKE 1 TABLET BY MOUTH EVERY NIGHT AT BEDTIME AS NEEDED      FLUoxetine (PROZAC) 10 MG Cap 1 cap 3x/week (M W F) 30 Capsule 0    omeprazole (PRILOSEC) 20 MG delayed-release capsule Take 20 mg by mouth every day.      multivitamin Tab Take 1 Tablet by mouth every day.       No current facility-administered medications for this visit.     He  has no past medical history on file.    Social History and Family History were reviewed and updated.    ROS   No chest pain, no shortness of  "breath, no abdominal pain and all other systems were reviewed and are negative.       Objective:     /66 (BP Location: Left arm, Patient Position: Sitting, BP Cuff Size: Adult)   Pulse 60   Temp 36.7 °C (98 °F) (Temporal)   Ht 1.626 m (5' 4\")   Wt 83.2 kg (183 lb 6.4 oz)   SpO2 99%  Body mass index is 31.48 kg/m².   Physical Exam:  Constitutional: Alert, distressed.  Stuttering.  Skin: Warm, dry, good turgor, no rashes in visible areas.  Eye: Equal, round and reactive, conjunctiva clear, lids normal.  ENMT: Lips without lesions, good dentition, oropharynx clear.  Neck: Trachea midline, no masses.   Lymph: No cervical or supraclavicular lymphadenopathy.  Neuro: Bilateral hand tremor noted.  Psych: Alert and oriented x3, normal affect and mood.        Assessment and Plan:   The following treatment plan was discussed    1. MILA (generalized anxiety disorder)  Chronic condition.  Recent exacerbation.  Uncontrolled.  Continue to follow with psychiatry.  Supplemental accommodation form was completed and returned to [facility].  Also will fax over medical records to his short-term disability claim department.  At this time he is not medically cleared to return to work.         Followup: Return in about 4 weeks (around 12/1/2023), or if symptoms worsen or fail to improve.    Please note that this dictation was created using voice recognition software. I have made every reasonable attempt to correct obvious errors, but I expect that there are errors of grammar and possibly content that I did not discover before finalizing the note.             "

## 2023-12-01 ENCOUNTER — APPOINTMENT (OUTPATIENT)
Dept: MEDICAL GROUP | Facility: MEDICAL CENTER | Age: 38
End: 2023-12-01
Payer: COMMERCIAL

## 2023-12-01 ENCOUNTER — TELEMEDICINE (OUTPATIENT)
Dept: MEDICAL GROUP | Facility: MEDICAL CENTER | Age: 38
End: 2023-12-01
Payer: COMMERCIAL

## 2023-12-01 VITALS — BODY MASS INDEX: 31.24 KG/M2 | HEIGHT: 64 IN | WEIGHT: 183 LBS

## 2023-12-01 DIAGNOSIS — F33.1 MODERATE EPISODE OF RECURRENT MAJOR DEPRESSIVE DISORDER (HCC): ICD-10-CM

## 2023-12-01 DIAGNOSIS — F41.1 GAD (GENERALIZED ANXIETY DISORDER): ICD-10-CM

## 2023-12-01 PROBLEM — F32.9 MAJOR DEPRESSIVE DISORDER: Status: ACTIVE | Noted: 2023-12-01

## 2023-12-01 PROCEDURE — 99214 OFFICE O/P EST MOD 30 MIN: CPT | Mod: 95 | Performed by: PHYSICIAN ASSISTANT

## 2023-12-01 RX ORDER — BUSPIRONE HYDROCHLORIDE 5 MG/1
5 TABLET ORAL 2 TIMES DAILY
COMMUNITY
Start: 2023-11-09 | End: 2024-01-29

## 2023-12-01 RX ORDER — CLONAZEPAM 0.5 MG/1
0.5 TABLET ORAL 3 TIMES DAILY
COMMUNITY
Start: 2023-11-09

## 2023-12-01 ASSESSMENT — PATIENT HEALTH QUESTIONNAIRE - PHQ9
2. FEELING DOWN, DEPRESSED, IRRITABLE, OR HOPELESS: NEARLY EVERY DAY
8. MOVING OR SPEAKING SO SLOWLY THAT OTHER PEOPLE COULD HAVE NOTICED. OR THE OPPOSITE, BEING SO FIGETY OR RESTLESS THAT YOU HAVE BEEN MOVING AROUND A LOT MORE THAN USUAL: SEVERAL DAYS
6. FEELING BAD ABOUT YOURSELF - OR THAT YOU ARE A FAILURE OR HAVE LET YOURSELF OR YOUR FAMILY DOWN: NEARLY EVERY DAY
7. TROUBLE CONCENTRATING ON THINGS, SUCH AS READING THE NEWSPAPER OR WATCHING TELEVISION: NEARLY EVERY DAY
1. LITTLE INTEREST OR PLEASURE IN DOING THINGS: NEARLY EVERY DAY
5. POOR APPETITE OR OVEREATING: SEVERAL DAYS
9. THOUGHTS THAT YOU WOULD BE BETTER OFF DEAD, OR OF HURTING YOURSELF: NOT AT ALL
3. TROUBLE FALLING OR STAYING ASLEEP OR SLEEPING TOO MUCH: NEARLY EVERY DAY
4. FEELING TIRED OR HAVING LITTLE ENERGY: NEARLY EVERY DAY
SUM OF ALL RESPONSES TO PHQ QUESTIONS 1-9: 20
SUM OF ALL RESPONSES TO PHQ9 QUESTIONS 1 AND 2: 6

## 2023-12-01 ASSESSMENT — FIBROSIS 4 INDEX: FIB4 SCORE: 0.65

## 2023-12-01 NOTE — PROGRESS NOTES
Subjective:   Suresh Ho is a 38 y.o. male here today for uncontrolled anxiety and depression.      This evaluation was conducted via Zoom using secure and encrypted videoconferencing technology. The patient was in their home in the [facility].    The patient's identity was confirmed and verbal consent was obtained for this virtual visit.      MILA (generalized anxiety disorder)  This is a pleasant 38-year-old male here today to follow-up on his history of anxiety and mild depression related to his employment at [employer].  He is currently now following with psychiatry weekly.  He is taking Prozac 10 mg daily.  He was also recently started on BuSpar and clonazepam.  He had issues now with driving.  Unable to drive because of his anxiety.  Typically he comes into the office but today because of his anxiety he stayed at home.  He scored high on his PHQ-9 at 20.  I recently filled out a supplemental regarding follow-up on his accommodations.  He is hoping to get an answer in January whether or not he will be able to maintain employment with [employer] at an another position for disability will continue until August 2024.       Current medicines (including changes today)  Current Outpatient Medications   Medication Sig Dispense Refill    clonazePAM (KLONOPIN) 0.5 MG Tab Take 0.5 mg by mouth 3 times a day.      busPIRone (BUSPAR) 5 MG tablet Take 5 mg by mouth 2 times a day.      FLUoxetine (PROZAC) 10 MG Cap Take 1 Capsule by mouth every day for 180 days. 90 Capsule 1    traZODone (DESYREL) 50 MG Tab TAKE 1 TABLET BY MOUTH EVERY NIGHT AT BEDTIME AS NEEDED      omeprazole (PRILOSEC) 20 MG delayed-release capsule Take 20 mg by mouth every day.      multivitamin Tab Take 1 Tablet by mouth every day.       No current facility-administered medications for this visit.     He  has no past medical history on file.    Social History and Family History were reviewed and updated.    ROS   No chest pain, no shortness of  "breath, no abdominal pain and all other systems were reviewed and are negative.       Objective:     Ht 1.626 m (5' 4\")   Wt 83 kg (183 lb)  Body mass index is 31.41 kg/m².   Physical Exam:  Constitutional: Alert, no distress.  Skin: Warm, dry, good turgor, no rashes in visible areas.  Eye: Equal, round and reactive, conjunctiva clear, lids normal.  ENMT: Lips without lesions, good dentition, oropharynx clear.  Psych: Alert and oriented x3, anxious appearing.        Assessment and Plan:   The following treatment plan was discussed    1. MILA (generalized anxiety disorder)  Chronic condition.  Uncontrolled.  Continue to follow with psychiatry weekly.  Updated medications list.    2. Moderate episode of recurrent major depressive disorder (HCC)  Chronic condition but new condition noted in chart.  Uncontrolled.  Continue medication as directed per psychiatry.  Follow-up with psychiatry.         Followup: Return in about 4 weeks (around 12/29/2023), or if symptoms worsen or fail to improve.    Please note that this dictation was created using voice recognition software. I have made every reasonable attempt to correct obvious errors, but I expect that there are errors of grammar and possibly content that I did not discover before finalizing the note.             "

## 2023-12-01 NOTE — ASSESSMENT & PLAN NOTE
This is a pleasant 38-year-old male here today to follow-up on his history of anxiety and mild depression related to his employment at [employer].  He is currently now following with psychiatry weekly.  He is taking Prozac 10 mg daily.  He was also recently started on BuSpar and clonazepam.  He had issues now with driving.  Unable to drive because of his anxiety.  Typically he comes into the office but today because of his anxiety he stayed at home.  He scored high on his PHQ-9 at 20.  I recently filled out a supplemental regarding follow-up on his accommodations.  He is hoping to get an answer in January whether or not he will be able to maintain employment with [employer] at an another position for disability will continue until August 2024.

## 2024-01-29 ENCOUNTER — TELEMEDICINE (OUTPATIENT)
Dept: MEDICAL GROUP | Facility: MEDICAL CENTER | Age: 39
End: 2024-01-29
Payer: COMMERCIAL

## 2024-01-29 VITALS — WEIGHT: 181 LBS | HEIGHT: 64 IN | BODY MASS INDEX: 30.9 KG/M2

## 2024-01-29 DIAGNOSIS — F41.1 GAD (GENERALIZED ANXIETY DISORDER): ICD-10-CM

## 2024-01-29 DIAGNOSIS — R25.1 TREMORS OF NERVOUS SYSTEM: ICD-10-CM

## 2024-01-29 PROCEDURE — 99214 OFFICE O/P EST MOD 30 MIN: CPT | Mod: 95 | Performed by: PHYSICIAN ASSISTANT

## 2024-01-29 RX ORDER — BUSPIRONE HYDROCHLORIDE 10 MG/1
10 TABLET ORAL 2 TIMES DAILY
COMMUNITY

## 2024-01-29 RX ORDER — QUETIAPINE FUMARATE 50 MG/1
50 TABLET, FILM COATED ORAL
COMMUNITY
Start: 2023-12-29

## 2024-01-29 ASSESSMENT — PATIENT HEALTH QUESTIONNAIRE - PHQ9: CLINICAL INTERPRETATION OF PHQ2 SCORE: 0

## 2024-01-29 ASSESSMENT — FIBROSIS 4 INDEX: FIB4 SCORE: 0.65

## 2024-01-29 NOTE — PROGRESS NOTES
Subjective:   Suresh Ho is a 38 y.o. male here today for anxiety with the possibility of completing a DMV placard form.    This evaluation was conducted via Zoom using secure and encrypted videoconferencing technology. The patient was in their home in the [facility].    The patient's identity was confirmed and verbal consent was obtained for this virtual visit.    MILA (generalized anxiety disorder)  This is a pleasant 38-year-old male in a virtual visit today.  Chronic history of anxiety with panic attacks and depression.  Currently follows with psychiatry.  Medications have been adjusted.  He is on Prozac 10 mg daily also on buspirone 10 mg twice a day.  Trazodone was discontinued because of nightmares.  He is on Seroquel 50 mg which does appear to be effective.  Does have panic attacks at times.  Will take clonazepam as needed.  Still going through the process regarding Worker's Comp. versus disability at [employer] [facility] [employer].  He is requesting a DMV placard.  Does have tremors which makes it difficult for him to walk at times when he is going through a panic attack.       Current medicines (including changes today)  Current Outpatient Medications   Medication Sig Dispense Refill    QUEtiapine (SEROQUEL) 50 MG tablet Take 50 mg by mouth at bedtime.      busPIRone (BUSPAR) 10 MG Tab tablet Take 10 mg by mouth 2 times a day.      clonazePAM (KLONOPIN) 0.5 MG Tab Take 0.5 mg by mouth 3 times a day.      FLUoxetine (PROZAC) 10 MG Cap Take 1 Capsule by mouth every day for 180 days. 90 Capsule 1    omeprazole (PRILOSEC) 20 MG delayed-release capsule Take 20 mg by mouth every day.      multivitamin Tab Take 1 Tablet by mouth every day.       No current facility-administered medications for this visit.     He  has no past medical history on file.    Social History and Family History were reviewed and updated.    ROS   No chest pain, no shortness of breath, no abdominal pain and all other systems were reviewed and are  "negative.       Objective:     Ht 1.626 m (5' 4\")   Wt 82.1 kg (181 lb)  Body mass index is 31.07 kg/m².   Physical Exam:  Constitutional: Alert, no distress.  Skin: Warm, dry, good turgor, no rashes in visible areas.  Eye: Equal, round and reactive, conjunctiva clear, lids normal.  ENMT: Lips without lesions, good dentition, oropharynx clear.  Psych: Alert and oriented x3, normal affect and mood.        Assessment and Plan:   The following treatment plan was discussed    1. MILA (generalized anxiety disorder)  Chronic condition.  Uncontrolled.  Continue to follow with psychiatry.  Updated medications list.    2. Tremors of nervous system  Chronic, intermittent condition.  Usually exacerbated by anxiety.  Advised to speak to his psychiatrist for regarding completion of the DMV placard form.  Most the time completing that will form requires a physical disability.  Is not typically met for mental disability but I asked him to speak to his psychiatrist and let me know if she will fill that out or not.         Followup: Return in about 4 weeks (around 2/26/2024), or if symptoms worsen or fail to improve.    Please note that this dictation was created using voice recognition software. I have made every reasonable attempt to correct obvious errors, but I expect that there are errors of grammar and possibly content that I did not discover before finalizing the note.             "

## 2024-01-29 NOTE — ASSESSMENT & PLAN NOTE
This is a pleasant 38-year-old male in a virtual visit today.  Chronic history of anxiety with panic attacks and depression.  Currently follows with psychiatry.  Medications have been adjusted.  He is on Prozac 10 mg daily also on buspirone 10 mg twice a day.  Trazodone was discontinued because of nightmares.  He is on Seroquel 50 mg which does appear to be effective.  Does have panic attacks at times.  Will take clonazepam as needed.  Still going through the process regarding Worker's Comp. versus disability at [facility].  He is requesting a DMV placard.  Does have tremors which makes it difficult for him to walk at times when he is going through a panic attack.

## 2024-03-04 ENCOUNTER — TELEMEDICINE (OUTPATIENT)
Dept: MEDICAL GROUP | Facility: MEDICAL CENTER | Age: 39
End: 2024-03-04

## 2024-03-04 VITALS — BODY MASS INDEX: 29.71 KG/M2 | WEIGHT: 174 LBS | HEIGHT: 64 IN

## 2024-03-04 DIAGNOSIS — F41.1 GAD (GENERALIZED ANXIETY DISORDER): ICD-10-CM

## 2024-03-04 PROCEDURE — 99213 OFFICE O/P EST LOW 20 MIN: CPT | Mod: 95 | Performed by: PHYSICIAN ASSISTANT

## 2024-03-04 ASSESSMENT — FIBROSIS 4 INDEX: FIB4 SCORE: 0.65

## 2024-03-04 NOTE — PROGRESS NOTES
"Subjective:   Suresh Ho is a 38 y.o. male here today for anxiety.    This evaluation was conducted via Zoom using secure and encrypted videoconferencing technology. The patient was in a private location outside of their home in the [facility].    The patient's identity was confirmed and verbal consent was obtained for this virtual visit.        MILA (generalized anxiety disorder)  This is a pleasant 38-year-old male in a virtual visit today.  Here today to discuss his recent ADA form.  Plans to return to work at [employer] in April.  His anxiety and depression symptoms are better controlled.  Although he is continues to have flares they do not last as long.  He is also on clonazepam which appears to be helpful in dealing with acute symptoms.  The form does need to be modified today.       Current medicines (including changes today)  Current Outpatient Medications   Medication Sig Dispense Refill    QUEtiapine (SEROQUEL) 50 MG tablet Take 50 mg by mouth at bedtime.      busPIRone (BUSPAR) 10 MG Tab tablet Take 10 mg by mouth 2 times a day.      clonazePAM (KLONOPIN) 0.5 MG Tab Take 0.5 mg by mouth 3 times a day.      FLUoxetine (PROZAC) 10 MG Cap Take 1 Capsule by mouth every day for 180 days. 90 Capsule 1    omeprazole (PRILOSEC) 20 MG delayed-release capsule Take 20 mg by mouth every day.      multivitamin Tab Take 1 Tablet by mouth every day.       No current facility-administered medications for this visit.     He  has no past medical history on file.    Social History and Family History were reviewed and updated.    ROS   No chest pain, no shortness of breath, no abdominal pain and all other systems were reviewed and are negative.       Objective:     Ht 1.626 m (5' 4\")   Wt 78.9 kg (174 lb)  Body mass index is 29.87 kg/m².   Physical Exam:  Constitutional: Alert, no distress.  Skin: Warm, dry, good turgor, no rashes in visible areas.  Eye: Equal, round and reactive, conjunctiva clear, lids " normal.  ENMT: Lips without lesions, good dentition, oropharynx clear.  Neck: Trachea midline, no masses.     Psych: Alert and oriented x3, normal affect and mood.        Assessment and Plan:   The following treatment plan was discussed    1. MILA (generalized anxiety disorder)  Chronic condition.  Uncontrolled.  Doing better with symptoms but still takes clonazepam as needed.  Continue Seroquel and BuSpar.  Continue Prozac.  Continue to follow with psychiatry.  Form was completed and updated.  He will be uploaded into my chart where he will send it to his ADA contact.      Followup: Return in about 3 months (around 6/4/2024), or if symptoms worsen or fail to improve.    Please note that this dictation was created using voice recognition software. I have made every reasonable attempt to correct obvious errors, but I expect that there are errors of grammar and possibly content that I did not discover before finalizing the note.

## 2024-03-04 NOTE — ASSESSMENT & PLAN NOTE
This is a pleasant 38-year-old male in a virtual visit today.  Here today to discuss his recent ADA form.  Plans to return to work at [employer] in April.  His anxiety and depression symptoms are better controlled.  Although he is continues to have flares they do not last as long.  He is also on clonazepam which appears to be helpful in dealing with acute symptoms.  The form does need to be modified today.

## 2024-03-13 ENCOUNTER — TELEMEDICINE (OUTPATIENT)
Dept: MEDICAL GROUP | Facility: MEDICAL CENTER | Age: 39
End: 2024-03-13

## 2024-03-13 VITALS — BODY MASS INDEX: 29.71 KG/M2 | HEIGHT: 64 IN | WEIGHT: 174 LBS

## 2024-03-13 DIAGNOSIS — F41.1 GAD (GENERALIZED ANXIETY DISORDER): ICD-10-CM

## 2024-03-13 DIAGNOSIS — F33.1 MODERATE EPISODE OF RECURRENT MAJOR DEPRESSIVE DISORDER (HCC): ICD-10-CM

## 2024-03-13 PROCEDURE — 99214 OFFICE O/P EST MOD 30 MIN: CPT | Mod: 95 | Performed by: PHYSICIAN ASSISTANT

## 2024-03-13 ASSESSMENT — FIBROSIS 4 INDEX: FIB4 SCORE: 0.65

## 2024-03-13 NOTE — ASSESSMENT & PLAN NOTE
This is a pleasant 38-year-old male here today in a virtual visit.  He has requested that I completed another supplemental physician questionnaire regarding accommodations at work.  He currently is following closely with psychiatry.  He is on fluoxetine and BuSpar on daily.  Takes Seroquel at bedtime.  Also will take clonazepam as needed when he has a panic attack.  His panic attacks are severe and caused him to stutter and have extremity tremors.  His symptoms have been improving but he still deals with anxiety and mild depression.  The form needs to clarify my judgment whether or not clonazepam may cause him side effects after taking the medication.  He states that he has no side effects taking clonazepam.  The current restrictions allow him to return to work if he has a flareup after 30 minutes to 1 hour following clonazepam.  He will typically take the medication and then resume his normal working duties at [employer] without any concern to his mental wellbeing.

## 2024-03-13 NOTE — PROGRESS NOTES
Subjective:   Suresh Ho is a 38 y.o. male here today for anxiety and depression.    This evaluation was conducted via Zoom using secure and encrypted videoconferencing technology. The patient was in their home in the [facility].    The patient's identity was confirmed and verbal consent was obtained for this virtual visit.      MILA (generalized anxiety disorder)  This is a pleasant 38-year-old male here today in a virtual visit.  He has requested that I completed another supplemental physician questionnaire regarding accommodations at work.  He currently is following closely with psychiatry.  He is on fluoxetine and BuSpar on daily.  Takes Seroquel at bedtime.  Also will take clonazepam as needed when he has a panic attack.  His panic attacks are severe and caused him to stutter and have extremity tremors.  His symptoms have been improving but he still deals with anxiety and mild depression.  The form needs to clarify my judgment whether or not clonazepam may cause him side effects after taking the medication.  He states that he has no side effects taking clonazepam.  The current restrictions allow him to return to work if he has a flareup after 30 minutes to 1 hour following clonazepam.  He will typically take the medication and then resume his normal working duties at [employer] without any concern to his mental wellbeing.       Current medicines (including changes today)  Current Outpatient Medications   Medication Sig Dispense Refill    QUEtiapine (SEROQUEL) 50 MG tablet Take 50 mg by mouth at bedtime.      busPIRone (BUSPAR) 10 MG Tab tablet Take 10 mg by mouth 2 times a day.      clonazePAM (KLONOPIN) 0.5 MG Tab Take 0.5 mg by mouth 3 times a day.      FLUoxetine (PROZAC) 10 MG Cap Take 1 Capsule by mouth every day for 180 days. 90 Capsule 1    omeprazole (PRILOSEC) 20 MG delayed-release capsule Take 20 mg by mouth every day.      multivitamin Tab Take 1 Tablet by mouth every day.       No current  "facility-administered medications for this visit.     He  has no past medical history on file.    Social History and Family History were reviewed and updated.    ROS   No chest pain, no shortness of breath, no abdominal pain and all other systems were reviewed and are negative.       Objective:     Ht 1.626 m (5' 4\")   Wt 78.9 kg (174 lb)  Body mass index is 29.87 kg/m².   Physical Exam:  Constitutional: Alert, no distress.  Skin: Warm, dry, good turgor, no rashes in visible areas.  Eye: Equal, round and reactive, conjunctiva clear, lids normal.  ENMT: Lips without lesions, good dentition, oropharynx clear.  Psych: Alert and oriented x3, normal affect and mood.        Assessment and Plan:   The following treatment plan was discussed    1. MILA (generalized anxiety disorder)  Chronic condition.  Uncontrolled.  Seems to doing much better with his current medication regimen.  Continue to follow with psychiatry.  Continue daily fluoxetine and BuSpar.  Continue Seroquel prior to bedtime.  Continue clonazepam as needed for panic attacks.  In my years of prescribing clonazepam medication is typically offered for daytime anxiety.  Also given for several other chronic medical conditions.  I have seen this medication taking during the day and not cause any effects which would cause someone not to be able to work or function properly.  I do not believe clonazepam will interfere with his daily roles at [employer].  I do believe it would benefit him as mentioned in prior restrictions he continue working 30 minutes to 1 hour following intake of the clonazepam.  He should not have impairment after taking the medication.    2. Moderate episode of recurrent major depressive disorder (HCC)  Chronic condition.  Stable.  Continue fluoxetine as directed.  Follow-up with psychiatry.         Followup: Return if symptoms worsen or fail to improve.    Please note that this dictation was created using voice recognition software. I have made every " reasonable attempt to correct obvious errors, but I expect that there are errors of grammar and possibly content that I did not discover before finalizing the note.

## 2024-04-17 ENCOUNTER — APPOINTMENT (OUTPATIENT)
Dept: MEDICAL GROUP | Facility: MEDICAL CENTER | Age: 39
End: 2024-04-17

## 2024-05-14 ENCOUNTER — APPOINTMENT (OUTPATIENT)
Dept: MEDICAL GROUP | Facility: MEDICAL CENTER | Age: 39
End: 2024-05-14

## 2024-08-24 ENCOUNTER — OFFICE VISIT (OUTPATIENT)
Dept: URGENT CARE | Facility: PHYSICIAN GROUP | Age: 39
End: 2024-08-24
Payer: MEDICAID

## 2024-08-24 VITALS
DIASTOLIC BLOOD PRESSURE: 64 MMHG | SYSTOLIC BLOOD PRESSURE: 112 MMHG | HEIGHT: 64 IN | BODY MASS INDEX: 30.9 KG/M2 | HEART RATE: 58 BPM | OXYGEN SATURATION: 100 % | WEIGHT: 181 LBS | TEMPERATURE: 98.5 F | RESPIRATION RATE: 14 BRPM

## 2024-08-24 DIAGNOSIS — T14.8XXA ABRASION: ICD-10-CM

## 2024-08-24 PROCEDURE — 99213 OFFICE O/P EST LOW 20 MIN: CPT

## 2024-08-24 PROCEDURE — 3074F SYST BP LT 130 MM HG: CPT

## 2024-08-24 PROCEDURE — 1125F AMNT PAIN NOTED PAIN PRSNT: CPT

## 2024-08-24 PROCEDURE — 3078F DIAST BP <80 MM HG: CPT

## 2024-08-24 RX ORDER — MUPIROCIN 20 MG/G
1 OINTMENT TOPICAL 3 TIMES DAILY
Qty: 1 G | Refills: 0 | Status: SHIPPED | OUTPATIENT
Start: 2024-08-24 | End: 2024-08-31

## 2024-08-24 ASSESSMENT — FIBROSIS 4 INDEX: FIB4 SCORE: 0.67

## 2024-08-24 ASSESSMENT — PAIN SCALES - GENERAL: PAINLEVEL: 5=MODERATE PAIN

## 2024-08-24 NOTE — PROGRESS NOTES
"Subjective:   Suresh Ho is a 39 y.o. male who presents for Laceration (R foot two toes cut on rock x2 days/Concerned about infection)      HPI  Patient cut his right second toe and right 5th [place]. He has been using hydrogen peroxide and triple antibiotic ointment.     Patient is using tylenol and NSAIDs, last dose was 0200.     He is not diabetic or immunocompromised     He is not up to date on his tetanus vaccine due to Uatsdin reasons    Review of Systems   Skin:         Abrasion to right toes   All other systems reviewed and are negative.      Medical History:  History reviewed. No pertinent past medical history.    Allergies:  No Known Allergies    Social history, surgical history, medications, and current problem list reviewed today in Epic.       Objective:     /64   Pulse (!) 58   Temp 36.9 °C (98.5 °F) (Temporal)   Resp 14   Ht 1.626 m (5' 4\")   Wt 82.1 kg (181 lb)   SpO2 100%     Physical Exam  Vitals reviewed.   Constitutional:       General: He is not in acute distress.     Appearance: Normal appearance. He is not ill-appearing, toxic-appearing or diaphoretic.   Cardiovascular:      Rate and Rhythm: Normal rate.      Pulses: Normal pulses.           Dorsalis pedis pulses are 2+ on the right side and 2+ on the left side.   Pulmonary:      Effort: Pulmonary effort is normal.   Musculoskeletal:         General: Normal range of motion.      Cervical back: Normal range of motion.        Feet:    Feet:      Right foot:      Skin integrity: Erythema and warmth present.      Toenail Condition: Right toenails are normal.      Left foot:      Skin integrity: Skin integrity normal.      Toenail Condition: Left toenails are normal.   Skin:     General: Skin is warm.      Capillary Refill: Capillary refill takes less than 2 seconds.   Neurological:      General: No focal deficit present.      Mental Status: He is alert and oriented to person, place, and " time.   Psychiatric:         Mood and Affect: Mood normal.         Behavior: Behavior normal.         Assessment/Plan:       Diagnosis and associated orders:     1. Abrasion  - mupirocin (BACTROBAN) 2 % Ointment; Apply 1 Application topically 3 times a day for 7 days.  Dispense: 1 g; Refill: 0     Comments/MDM:       39-year-old afebrile, hemodynamically stable, well-appearing male presenting with abrasions to right toes.  Patient states he has been cleaning the area with soap and water, hydrogen peroxide, and applying triple antibiotic ointment.  He is not up-to-date on his tetanus vaccine, however, patient declines vaccination at this time due to Buddhism reasons.  Patient encouraged to continue to use soap and water but discontinue hydrogen peroxide and triple antibiotic ointment.  A prescription for mupirocin ointment 3 times daily has been sent to the pharmacy.  Patient educated on signs and symptoms of worsening skin infection that would warrant return for wound recheck.  Patient encouraged to take Tylenol and ibuprofen as needed for pain, elevate foot, apply ice as needed.  No concern for neurovascular compromise.  No concern for systemic infection.        Patient is clinically stable at today's acute urgent care visit. Vital signs are normal and reassuring.  No acute distress noted. Appropriate for outpatient management at this time. No red flag warnings noted.  Patient given strict instructions to follow up with emergency room if they develop any red flag warnings which were discussed in depth.  They verbalized understanding.      Differential diagnosis, natural history, supportive care, and indications for immediate follow-up discussed. All questions answered. Patient agrees with the plan of care. Advised the patient to follow-up with the primary care physician for recheck, reevaluation, and consideration of further management or the emergency room for worsening symptoms.      Please note that this  dictation was created using voice recognition software. I have made every reasonable attempt to correct obvious errors, but I expect that there are errors of grammar and possibly content that I did not discover before finalizing the note.

## 2025-01-20 ENCOUNTER — OFFICE VISIT (OUTPATIENT)
Dept: URGENT CARE | Facility: PHYSICIAN GROUP | Age: 40
End: 2025-01-20
Payer: MEDICAID

## 2025-01-20 VITALS
HEART RATE: 77 BPM | OXYGEN SATURATION: 99 % | SYSTOLIC BLOOD PRESSURE: 130 MMHG | HEIGHT: 64 IN | WEIGHT: 173.6 LBS | DIASTOLIC BLOOD PRESSURE: 70 MMHG | BODY MASS INDEX: 29.64 KG/M2 | TEMPERATURE: 98.1 F | RESPIRATION RATE: 16 BRPM

## 2025-01-20 DIAGNOSIS — J40 BRONCHITIS: ICD-10-CM

## 2025-01-20 LAB — S PYO DNA SPEC NAA+PROBE: NOT DETECTED

## 2025-01-20 PROCEDURE — 3075F SYST BP GE 130 - 139MM HG: CPT | Performed by: FAMILY MEDICINE

## 2025-01-20 PROCEDURE — 3078F DIAST BP <80 MM HG: CPT | Performed by: FAMILY MEDICINE

## 2025-01-20 PROCEDURE — 87651 STREP A DNA AMP PROBE: CPT | Performed by: FAMILY MEDICINE

## 2025-01-20 PROCEDURE — 99213 OFFICE O/P EST LOW 20 MIN: CPT | Performed by: FAMILY MEDICINE

## 2025-01-20 RX ORDER — METHYLPREDNISOLONE 4 MG/1
TABLET ORAL
Qty: 21 TABLET | Refills: 0 | Status: SHIPPED | OUTPATIENT
Start: 2025-01-20

## 2025-01-20 RX ORDER — ALBUTEROL SULFATE 90 UG/1
2 INHALANT RESPIRATORY (INHALATION) EVERY 6 HOURS PRN
Qty: 1 EACH | Refills: 0 | Status: SHIPPED | OUTPATIENT
Start: 2025-01-20

## 2025-01-20 RX ORDER — BENZONATATE 200 MG/1
200 CAPSULE ORAL 3 TIMES DAILY PRN
Qty: 45 CAPSULE | Refills: 0 | Status: SHIPPED | OUTPATIENT
Start: 2025-01-20

## 2025-01-20 ASSESSMENT — FIBROSIS 4 INDEX: FIB4 SCORE: 0.67

## 2025-01-20 NOTE — PROGRESS NOTES
CC:  cough        Cough  This is a new problem. The current episode started 6 days ago. The problem has been unchanged. The problem occurs constantly. The cough is dry. Associated symptoms include : wheezing, muscle aches, fever. Pertinent negatives include no   headaches, nausea, vomiting, diarrhea, sweats, weight loss . Nothing aggravates the symptoms.  Patient has tried nothing for the symptoms. There is no history of asthma.        No past medical history on file.      Social History     Tobacco Use    Smoking status: Former     Current packs/day: 0.00     Types: Cigars, Cigarettes     Quit date: 2017     Years since quittin.4    Smokeless tobacco: Current    Tobacco comments:     once every six months; 2018 E Cig every 4-5 months   Vaping Use    Vaping status: Never Used   Substance Use Topics    Alcohol use: Not Currently     Comment: 2 days/week, 6 drinks/day    Drug use: Yes     Types: Marijuana         Current Outpatient Medications on File Prior to Visit   Medication Sig Dispense Refill    QUEtiapine (SEROQUEL) 50 MG tablet Take 50 mg by mouth at bedtime.      omeprazole (PRILOSEC) 20 MG delayed-release capsule Take 20 mg by mouth every day.      multivitamin Tab Take 1 Tablet by mouth every day.      busPIRone (BUSPAR) 10 MG Tab tablet Take 10 mg by mouth 2 times a day. (Patient not taking: Reported on 2025)      clonazePAM (KLONOPIN) 0.5 MG Tab Take 0.5 mg by mouth 3 times a day. (Patient not taking: Reported on 2025)       No current facility-administered medications on file prior to visit.                    Review of Systems   Constitutional: Negative for fever and weight loss.   HENT: negative for otalgia  Cardiovascular - denies chest pain or dyspnea  Respiratory: Positive for cough.  .  + for wheezing.    Neurological: Negative for headaches.   GI - denies nausea, vomiting or diarrhea  Neuro - denies numbness or tingling.            Objective:     /70   Pulse 77    "Temp 36.7 °C (98.1 °F) (Temporal)   Resp 16   Ht 1.626 m (5' 4\")   Wt 78.7 kg (173 lb 9.6 oz)   SpO2 99%     Physical Exam   Constitutional: patient is oriented to person, place, and time. Patient appears well-developed and well-nourished. No distress.   HENT:   Head: Normocephalic and atraumatic.   Right Ear: External ear normal.   Left Ear: External ear normal.   Nose: Mucosal edema  present. Right sinus exhibits no maxillary sinus tenderness. Left sinus exhibits no maxillary sinus tenderness.   Mouth/Throat: Mucous membranes are normal. No oral lesions.  No posterior pharyngeal erythema.  No oropharyngeal exudate or posterior oropharyngeal edema.   Eyes: Conjunctivae and EOM are normal. Pupils are equal, round, and reactive to light. Right eye exhibits no discharge. Left eye exhibits no discharge. No scleral icterus.   Neck: Normal range of motion. Neck supple. No tracheal deviation present.   Cardiovascular: Normal rate, regular rhythm and normal heart sounds.  Exam reveals no friction rub.    Pulmonary/Chest: Effort normal. No respiratory distress. Patient has no wheezes or rhonchi. Patient has no rales.    Musculoskeletal:  exhibits no edema.   Lymphadenopathy:     Patient has no cervical adenopathy.      Neurological: patient is alert and oriented to person, place, and time.   Skin: Skin is warm and dry. No rash noted. No erythema.   Psychiatric: patient  has a normal mood and affect.  behavior is normal.   Nursing note and vitals reviewed.       Rapid strep negative.        Assessment/Plan:        1. Bronchitis     - methylPREDNISolone (MEDROL DOSEPAK) 4 MG Tablet Therapy Pack; Follow schedule on package instructions.  Dispense: 21 Tablet; Refill: 0  - benzonatate (TESSALON) 200 MG capsule; Take 1 Capsule by mouth 3 times a day as needed for Cough.  Dispense: 45 Capsule; Refill: 0  - albuterol 108 (90 Base) MCG/ACT Aero Soln inhalation aerosol; Inhale 2 Puffs every 6 hours as needed for Shortness of " Breath.  Dispense: 1 Each; Refill: 0       Differential diagnosis, natural history, supportive care, and indications for immediate follow-up discussed. All questions answered. Patient agrees with the plan of care.     Follow-up as needed if symptoms worsen or fail to improve to PCP, Urgent care or Emergency Room.     I have personally reviewed prior external notes and test results pertinent to today's visit.  I have independently reviewed and interpreted all diagnostics ordered during this urgent care acute visit.

## 2025-01-30 ENCOUNTER — OFFICE VISIT (OUTPATIENT)
Dept: URGENT CARE | Facility: PHYSICIAN GROUP | Age: 40
End: 2025-01-30
Payer: MEDICAID

## 2025-01-30 ENCOUNTER — APPOINTMENT (OUTPATIENT)
Dept: RADIOLOGY | Facility: IMAGING CENTER | Age: 40
End: 2025-01-30
Attending: PHYSICIAN ASSISTANT
Payer: MEDICAID

## 2025-01-30 VITALS
BODY MASS INDEX: 30.19 KG/M2 | RESPIRATION RATE: 20 BRPM | OXYGEN SATURATION: 98 % | HEART RATE: 65 BPM | DIASTOLIC BLOOD PRESSURE: 70 MMHG | WEIGHT: 176.8 LBS | SYSTOLIC BLOOD PRESSURE: 124 MMHG | TEMPERATURE: 98.2 F | HEIGHT: 64 IN

## 2025-01-30 DIAGNOSIS — M79.644 FINGER PAIN, RIGHT: ICD-10-CM

## 2025-01-30 DIAGNOSIS — S69.91XA JAMMED INTERPHALANGEAL JOINT OF FINGER OF RIGHT HAND, INITIAL ENCOUNTER: ICD-10-CM

## 2025-01-30 PROCEDURE — 73140 X-RAY EXAM OF FINGER(S): CPT | Mod: TC,FY,RT | Performed by: RADIOLOGY

## 2025-01-30 ASSESSMENT — FIBROSIS 4 INDEX: FIB4 SCORE: 0.67

## 2025-01-30 NOTE — PROGRESS NOTES
Subjective:   Suresh Ho is a 39 y.o. male who presents today with   Chief Complaint   Patient presents with    Finger Injury     Pt slipped while on fishing expedition, pt sts when he slipped his right ring finger took most the weight and slid into a rock, he sts he couldn't move it at al he tried to pop it into place and can move the finger now but not as much as normal, pain and swelling      HPI    Patient is having pain to his right ring finger.  He was out fishing yesterday and was holding his fishing pole in the right hand and was walking up an incline and slipped and his right ring finger jammed into the ground.  He states that he had pain and could not move the finger and then pulled on it and felt a pop and then could move it.    PMH:  has no past medical history on file.  MEDS:   Current Outpatient Medications:     benzonatate (TESSALON) 200 MG capsule, Take 1 Capsule by mouth 3 times a day as needed for Cough., Disp: 45 Capsule, Rfl: 0    albuterol 108 (90 Base) MCG/ACT Aero Soln inhalation aerosol, Inhale 2 Puffs every 6 hours as needed for Shortness of Breath., Disp: 1 Each, Rfl: 0    QUEtiapine (SEROQUEL) 50 MG tablet, Take 50 mg by mouth at bedtime., Disp: , Rfl:     omeprazole (PRILOSEC) 20 MG delayed-release capsule, Take 20 mg by mouth every day., Disp: , Rfl:     multivitamin Tab, Take 1 Tablet by mouth every day., Disp: , Rfl:     methylPREDNISolone (MEDROL DOSEPAK) 4 MG Tablet Therapy Pack, Follow schedule on package instructions. (Patient not taking: Reported on 1/30/2025), Disp: 21 Tablet, Rfl: 0    busPIRone (BUSPAR) 10 MG Tab tablet, Take 10 mg by mouth 2 times a day. (Patient not taking: Reported on 1/30/2025), Disp: , Rfl:     clonazePAM (KLONOPIN) 0.5 MG Tab, Take 0.5 mg by mouth 3 times a day. (Patient not taking: Reported on 1/30/2025), Disp: , Rfl:   ALLERGIES: No Known Allergies  SURGHX:   Past Surgical History:   Procedure Laterality Date    NV COLONOSCOPY,DIAGNOSTIC  "N/A 6/15/2023    Procedure: COLONOSCOPY;  Surgeon: Christy Walter M.D.;  Location: SURGERY SAME DAY [facility] ER;  Service: Gastroenterology    ME COLONOSCOPY,BIOPSY N/A 6/15/2023    Procedure: COLONOSCOPY, WITH BIOPSY;  Surgeon: Christy Walter M.D.;  Location: SURGERY SAME DAY [facility] ER;  Service: Gastroenterology    VENTRAL HERNIA REPAIR  9/12/2018    Procedure: VENTRAL HERNIA REPAIR - W/MESH;  Surgeon: Katherine Brennan M.D.;  Location: SURGERY [facility];  Service: General    APPENDECTOMY  2010     SOCHX:  reports that he quit smoking about 7 years ago. His smoking use included cigars and cigarettes. He uses smokeless tobacco. He reports that he does not currently use alcohol. He reports current drug use. Drug: Marijuana.  FH: Reviewed with patient, not pertinent to this visit.       Review of Systems   Musculoskeletal:         Right finger pain      Objective:   /70   Pulse 65   Temp 36.8 °C (98.2 °F) (Temporal)   Resp 20   Ht 1.626 m (5' 4\")   Wt 80.2 kg (176 lb 12.8 oz)   SpO2 98%   BMI 30.35 kg/m²   Physical Exam  Vitals and nursing note reviewed.   Constitutional:       General: He is not in acute distress.     Appearance: Normal appearance. He is well-developed. He is not ill-appearing or toxic-appearing.   HENT:      Head: Normocephalic and atraumatic.      Right Ear: Hearing normal.      Left Ear: Hearing normal.   Cardiovascular:      Rate and Rhythm: Normal rate.   Pulmonary:      Effort: Pulmonary effort is normal.   Musculoskeletal:        Hands:       Comments: Tenderness to palpation of the fourth digit of the right hand.  Neurovascular intact distally.  Less than 2 capillary fill.  Significant swelling and some bruising noted the area.  Patient only able to slightly flex the finger approximately.  No flexor or extensor tendon deformity noted at this time.  No tenderness through the dorsum of the right hand. No laxity noted to the finger.   Skin:     General: Skin is warm and " dry.   Neurological:      Mental Status: He is alert.      Coordination: Coordination normal.   Psychiatric:         Mood and Affect: Mood normal.       DX FINGER  FINDINGS:     BONE MINERALIZATION: Normal.  JOINTS: Preserved. No erosions.  FRACTURE: None.  DISLOCATION: None.  SOFT TISSUES: Ring finger soft tissue swelling.     IMPRESSION:     Ring finger soft tissue swelling. No fracture or dislocation.    Assessment/Plan:   Assessment    1. Finger pain, right  - DX-FINGER(S) 2+ RIGHT; Future    2. Jammed interphalangeal joint of finger of right hand, initial encounter  - Referral to Sports Medicine    Symptoms and presentation appear consistent with jammed right ring finger.  Discussed possible ligamentous injury with patient and he stands and will follow-up with sports medicine if symptoms persist.  Recommend wearing finger splint until he follows up with specialist.    Differential diagnosis, natural history, supportive care, and indications for immediate follow-up discussed.   Patient given instructions and understanding of medications and treatment.    If not improving in 3-5 days, F/U with PCP or return to UC if symptoms worsen.    Patient agreeable to plan.      Please note that this dictation was created using voice recognition software. I have made every reasonable attempt to correct obvious errors, but I expect that there are errors of grammar and possibly content that I did not discover before finalizing the note.    Mani Gusman PA-C

## (undated) DEVICE — SET LEADWIRE 5 LEAD BEDSIDE DISPOSABLE ECG (1SET OF 5/EA)

## (undated) DEVICE — PACK MAJOR BASIN - (2EA/CA)

## (undated) DEVICE — CANISTER SUCTION RIGID RED 1500CC (40EA/CA)

## (undated) DEVICE — MASK PANORAMIC OXYGEN PRO2 (30EA/CA)

## (undated) DEVICE — SENSOR OXIMETER ADULT SPO2 RD SET (20EA/BX)

## (undated) DEVICE — GLOVE BIOGEL INDICATOR SZ 6.5 SURGICAL PF LTX - (50PR/BX 4BX/CA)

## (undated) DEVICE — CATHETER IV 20 GA X 1-1/4 ---SURG.& SDS ONLY--- (50EA/BX)

## (undated) DEVICE — CANISTER SUCTION 3000ML MECHANICAL FILTER AUTO SHUTOFF MEDI-VAC NONSTERILE LF DISP  (40EA/CA)

## (undated) DEVICE — CONTAINER, SPECIMEN, STERILE

## (undated) DEVICE — NEPTUNE 4 PORT MANIFOLD - (20/PK)

## (undated) DEVICE — MASK, LARYNGEAL AIRWAY #4

## (undated) DEVICE — SODIUM CHL IRRIGATION 0.9% 1000ML (12EA/CA)

## (undated) DEVICE — SET EXTENSION WITH 2 PORTS (48EA/CA) ***PART #2C8610 IS A SUBSTITUTE*****

## (undated) DEVICE — KIT ANESTHESIA W/CIRCUIT & 3/LT BAG W/FILTER (20EA/CA)

## (undated) DEVICE — CANNULA O2 COMFORT SOFT EAR ADULT 7 FT TUBING (50/CA)

## (undated) DEVICE — SUTURE GENERAL

## (undated) DEVICE — ELECTRODE DUAL RETURN W/ CORD - (50/PK)

## (undated) DEVICE — TUBING CLEARLINK DUO-VENT - C-FLO (48EA/CA)

## (undated) DEVICE — MASK ANESTHESIA ADULT  - (100/CA)

## (undated) DEVICE — SUTURE 4-0 MONOCRYL PLUS PS-2 - 27 INCH (36/BX)

## (undated) DEVICE — SENSOR SPO2 NEO LNCS ADHESIVE (20/BX) SEE USER NOTES

## (undated) DEVICE — HEAD HOLDER JUNIOR/ADULT

## (undated) DEVICE — SUTURE 3-0 VICRYL PLUS SH - 27 INCH (36/BX)

## (undated) DEVICE — TOWEL STOP TIMEOUT SAFETY FLAG (40EA/CA)

## (undated) DEVICE — BOVIE BLADE COATED &INSULATED - 25/PK

## (undated) DEVICE — DRAPE LAPAROTOMY T SHEET - (12EA/CA)

## (undated) DEVICE — ELECTRODE 850 FOAM ADHESIVE - HYDROGEL RADIOTRNSPRNT (50/PK)

## (undated) DEVICE — SUCTION INSTRUMENT YANKAUER BULBOUS TIP W/O VENT (50EA/CA)

## (undated) DEVICE — KIT  I.V. START (100EA/CA)

## (undated) DEVICE — TUBE CONNECTING SUCTION - CLEAR PLASTIC STERILE 72 IN (50EA/CA)

## (undated) DEVICE — KIT CUSTOM PROCEDURE SINGLE FOR ENDO  (15/CA)

## (undated) DEVICE — FILM CASSETTE ENDO

## (undated) DEVICE — WATER IRRIGATION STERILE 1000ML (12EA/CA)

## (undated) DEVICE — FORCEP RADIAL JAW 4 STANDARD CAPACITY W/NEEDLE 240CM (40EA/BX)

## (undated) DEVICE — PROTECTOR ULNA NERVE - (36PR/CA)